# Patient Record
Sex: MALE | Race: WHITE | NOT HISPANIC OR LATINO | Employment: OTHER | URBAN - METROPOLITAN AREA
[De-identification: names, ages, dates, MRNs, and addresses within clinical notes are randomized per-mention and may not be internally consistent; named-entity substitution may affect disease eponyms.]

---

## 2017-01-13 ENCOUNTER — HOSPITAL ENCOUNTER (OUTPATIENT)
Dept: NEUROLOGY | Facility: CLINIC | Age: 82
Setting detail: THERAPIES SERIES
Discharge: STILL ADMITTED OR ADMINISTRATIVE | End: 2017-01-13
Attending: PSYCHIATRY & NEUROLOGY

## 2017-01-13 DIAGNOSIS — G31.84 MCI (MILD COGNITIVE IMPAIRMENT) WITH MEMORY LOSS: ICD-10-CM

## 2017-02-14 ENCOUNTER — COMMUNICATION - HEALTHEAST (OUTPATIENT)
Dept: FAMILY MEDICINE | Facility: CLINIC | Age: 82
End: 2017-02-14

## 2017-02-14 DIAGNOSIS — N52.9 ED (ERECTILE DYSFUNCTION): ICD-10-CM

## 2017-04-11 ENCOUNTER — RECORDS - HEALTHEAST (OUTPATIENT)
Dept: ADMINISTRATIVE | Facility: OTHER | Age: 82
End: 2017-04-11

## 2017-05-16 ENCOUNTER — COMMUNICATION - HEALTHEAST (OUTPATIENT)
Dept: LAB | Facility: CLINIC | Age: 82
End: 2017-05-16

## 2017-05-16 DIAGNOSIS — R73.03 PREDIABETES: ICD-10-CM

## 2017-05-16 DIAGNOSIS — C61 MALIGNANT NEOPLASM OF PROSTATE (H): ICD-10-CM

## 2017-05-18 ENCOUNTER — AMBULATORY - HEALTHEAST (OUTPATIENT)
Dept: LAB | Facility: CLINIC | Age: 82
End: 2017-05-18

## 2017-05-18 DIAGNOSIS — R73.03 PREDIABETES: ICD-10-CM

## 2017-05-18 DIAGNOSIS — C61 MALIGNANT NEOPLASM OF PROSTATE (H): ICD-10-CM

## 2017-05-18 LAB
HBA1C MFR BLD: 6.1 % (ref 3.5–6)
PSA SERPL-MCNC: 0.1 NG/ML (ref 0–6.5)

## 2017-05-31 ENCOUNTER — COMMUNICATION - HEALTHEAST (OUTPATIENT)
Dept: FAMILY MEDICINE | Facility: CLINIC | Age: 82
End: 2017-05-31

## 2017-05-31 DIAGNOSIS — N52.9 ED (ERECTILE DYSFUNCTION): ICD-10-CM

## 2017-06-07 ENCOUNTER — COMMUNICATION - HEALTHEAST (OUTPATIENT)
Dept: FAMILY MEDICINE | Facility: CLINIC | Age: 82
End: 2017-06-07

## 2017-06-07 DIAGNOSIS — N52.9 ED (ERECTILE DYSFUNCTION): ICD-10-CM

## 2017-06-12 ENCOUNTER — COMMUNICATION - HEALTHEAST (OUTPATIENT)
Dept: FAMILY MEDICINE | Facility: CLINIC | Age: 82
End: 2017-06-12

## 2017-06-12 DIAGNOSIS — F30.9 BIPOLAR I DISORDER, SINGLE MANIC EPISODE (H): ICD-10-CM

## 2017-09-27 ENCOUNTER — COMMUNICATION - HEALTHEAST (OUTPATIENT)
Dept: FAMILY MEDICINE | Facility: CLINIC | Age: 82
End: 2017-09-27

## 2017-09-27 DIAGNOSIS — F32.A DEPRESSION: ICD-10-CM

## 2017-10-24 ENCOUNTER — RECORDS - HEALTHEAST (OUTPATIENT)
Dept: ADMINISTRATIVE | Facility: OTHER | Age: 82
End: 2017-10-24

## 2017-11-21 ENCOUNTER — RECORDS - HEALTHEAST (OUTPATIENT)
Dept: ADMINISTRATIVE | Facility: OTHER | Age: 82
End: 2017-11-21

## 2017-11-24 ENCOUNTER — OFFICE VISIT - HEALTHEAST (OUTPATIENT)
Dept: FAMILY MEDICINE | Facility: CLINIC | Age: 82
End: 2017-11-24

## 2017-11-24 DIAGNOSIS — Z00.00 ROUTINE GENERAL MEDICAL EXAMINATION AT A HEALTH CARE FACILITY: ICD-10-CM

## 2017-11-24 DIAGNOSIS — R73.01 IMPAIRED FASTING GLUCOSE: ICD-10-CM

## 2017-11-24 DIAGNOSIS — F30.9 BIPOLAR I DISORDER, SINGLE MANIC EPISODE (H): ICD-10-CM

## 2017-11-24 DIAGNOSIS — G31.84 MCI (MILD COGNITIVE IMPAIRMENT): ICD-10-CM

## 2017-11-24 DIAGNOSIS — C61 MALIGNANT NEOPLASM OF PROSTATE (H): ICD-10-CM

## 2017-11-24 LAB
HBA1C MFR BLD: 6.1 % (ref 3.5–6)
PSA SERPL-MCNC: 0.1 NG/ML (ref 0–6.5)

## 2017-11-24 ASSESSMENT — MIFFLIN-ST. JEOR: SCORE: 1322.43

## 2017-12-13 ENCOUNTER — COMMUNICATION - HEALTHEAST (OUTPATIENT)
Dept: FAMILY MEDICINE | Facility: CLINIC | Age: 82
End: 2017-12-13

## 2018-01-08 ENCOUNTER — HOSPITAL ENCOUNTER (OUTPATIENT)
Dept: NEUROLOGY | Facility: CLINIC | Age: 83
Setting detail: THERAPIES SERIES
Discharge: STILL ADMITTED OR ADMINISTRATIVE | End: 2018-01-08
Attending: PSYCHIATRY & NEUROLOGY

## 2018-01-08 DIAGNOSIS — G31.84 MCI (MILD COGNITIVE IMPAIRMENT): ICD-10-CM

## 2018-01-08 DIAGNOSIS — G31.84 MCI (MILD COGNITIVE IMPAIRMENT) WITH MEMORY LOSS: ICD-10-CM

## 2018-01-08 DIAGNOSIS — F30.9 BIPOLAR I DISORDER, SINGLE MANIC EPISODE (H): ICD-10-CM

## 2018-01-08 DIAGNOSIS — R73.01 IMPAIRED FASTING GLUCOSE: ICD-10-CM

## 2018-01-08 DIAGNOSIS — N40.0 BPH (BENIGN PROSTATIC HYPERPLASIA): ICD-10-CM

## 2018-01-10 ENCOUNTER — COMMUNICATION - HEALTHEAST (OUTPATIENT)
Dept: FAMILY MEDICINE | Facility: CLINIC | Age: 83
End: 2018-01-10

## 2018-01-22 ENCOUNTER — HOSPITAL ENCOUNTER (OUTPATIENT)
Dept: NEUROLOGY | Facility: CLINIC | Age: 83
Setting detail: THERAPIES SERIES
Discharge: STILL ADMITTED OR ADMINISTRATIVE | End: 2018-01-22
Attending: PSYCHIATRY & NEUROLOGY

## 2018-01-22 DIAGNOSIS — G31.84 COGNITIVE IMPAIRMENT, MILD, SO STATED: ICD-10-CM

## 2018-03-12 ENCOUNTER — COMMUNICATION - HEALTHEAST (OUTPATIENT)
Dept: FAMILY MEDICINE | Facility: CLINIC | Age: 83
End: 2018-03-12

## 2018-03-15 ENCOUNTER — COMMUNICATION - HEALTHEAST (OUTPATIENT)
Dept: FAMILY MEDICINE | Facility: CLINIC | Age: 83
End: 2018-03-15

## 2018-03-15 DIAGNOSIS — N52.9 ED (ERECTILE DYSFUNCTION): ICD-10-CM

## 2018-03-23 ENCOUNTER — COMMUNICATION - HEALTHEAST (OUTPATIENT)
Dept: FAMILY MEDICINE | Facility: CLINIC | Age: 83
End: 2018-03-23

## 2018-06-11 ENCOUNTER — COMMUNICATION - HEALTHEAST (OUTPATIENT)
Dept: FAMILY MEDICINE | Facility: CLINIC | Age: 83
End: 2018-06-11

## 2018-06-11 DIAGNOSIS — F30.9 BIPOLAR I DISORDER, SINGLE MANIC EPISODE (H): ICD-10-CM

## 2018-10-01 ENCOUNTER — COMMUNICATION - HEALTHEAST (OUTPATIENT)
Dept: FAMILY MEDICINE | Facility: CLINIC | Age: 83
End: 2018-10-01

## 2018-10-23 ENCOUNTER — RECORDS - HEALTHEAST (OUTPATIENT)
Dept: ADMINISTRATIVE | Facility: OTHER | Age: 83
End: 2018-10-23

## 2018-12-05 ENCOUNTER — COMMUNICATION - HEALTHEAST (OUTPATIENT)
Dept: FAMILY MEDICINE | Facility: CLINIC | Age: 83
End: 2018-12-05

## 2018-12-05 ENCOUNTER — AMBULATORY - HEALTHEAST (OUTPATIENT)
Dept: FAMILY MEDICINE | Facility: CLINIC | Age: 83
End: 2018-12-05

## 2018-12-05 ENCOUNTER — OFFICE VISIT - HEALTHEAST (OUTPATIENT)
Dept: FAMILY MEDICINE | Facility: CLINIC | Age: 83
End: 2018-12-05

## 2018-12-05 DIAGNOSIS — Z12.5 SCREENING FOR PROSTATE CANCER: ICD-10-CM

## 2018-12-05 DIAGNOSIS — Z00.00 ROUTINE GENERAL MEDICAL EXAMINATION AT A HEALTH CARE FACILITY: ICD-10-CM

## 2018-12-05 DIAGNOSIS — R73.03 PRE-DIABETES: ICD-10-CM

## 2018-12-05 DIAGNOSIS — Z80.42 FAMILY HISTORY OF MALIGNANT NEOPLASM OF PROSTATE: ICD-10-CM

## 2018-12-05 DIAGNOSIS — F30.9 BIPOLAR I DISORDER, SINGLE MANIC EPISODE (H): ICD-10-CM

## 2018-12-05 DIAGNOSIS — G31.84 MCI (MILD COGNITIVE IMPAIRMENT): ICD-10-CM

## 2018-12-05 DIAGNOSIS — M19.012 OSTEOARTHRITIS OF LEFT SHOULDER, UNSPECIFIED OSTEOARTHRITIS TYPE: ICD-10-CM

## 2018-12-05 LAB
ANION GAP SERPL CALCULATED.3IONS-SCNC: 9 MMOL/L (ref 5–18)
BUN SERPL-MCNC: 10 MG/DL (ref 8–28)
CALCIUM SERPL-MCNC: 9.7 MG/DL (ref 8.5–10.5)
CHLORIDE BLD-SCNC: 103 MMOL/L (ref 98–107)
CHOLEST SERPL-MCNC: 146 MG/DL
CO2 SERPL-SCNC: 28 MMOL/L (ref 22–31)
CREAT SERPL-MCNC: 0.83 MG/DL (ref 0.7–1.3)
FASTING STATUS PATIENT QL REPORTED: YES
GFR SERPL CREATININE-BSD FRML MDRD: >60 ML/MIN/1.73M2
GLUCOSE BLD-MCNC: 103 MG/DL (ref 70–125)
HBA1C MFR BLD: 6 % (ref 3.5–6)
HDLC SERPL-MCNC: 72 MG/DL
LDLC SERPL CALC-MCNC: 64 MG/DL
POTASSIUM BLD-SCNC: 5.1 MMOL/L (ref 3.5–5)
PSA SERPL-MCNC: 0.1 NG/ML (ref 0–6.5)
SODIUM SERPL-SCNC: 140 MMOL/L (ref 136–145)
TRIGL SERPL-MCNC: 52 MG/DL

## 2018-12-05 ASSESSMENT — MIFFLIN-ST. JEOR: SCORE: 1326.74

## 2018-12-06 ENCOUNTER — COMMUNICATION - HEALTHEAST (OUTPATIENT)
Dept: FAMILY MEDICINE | Facility: CLINIC | Age: 83
End: 2018-12-06

## 2018-12-07 ENCOUNTER — COMMUNICATION - HEALTHEAST (OUTPATIENT)
Dept: FAMILY MEDICINE | Facility: CLINIC | Age: 83
End: 2018-12-07

## 2019-01-04 ENCOUNTER — RECORDS - HEALTHEAST (OUTPATIENT)
Dept: ADMINISTRATIVE | Facility: OTHER | Age: 84
End: 2019-01-04

## 2019-01-21 ENCOUNTER — HOSPITAL ENCOUNTER (OUTPATIENT)
Dept: NEUROLOGY | Facility: CLINIC | Age: 84
Setting detail: THERAPIES SERIES
Discharge: STILL ADMITTED OR ADMINISTRATIVE | End: 2019-01-21
Attending: PSYCHIATRY & NEUROLOGY

## 2019-02-11 ENCOUNTER — RECORDS - HEALTHEAST (OUTPATIENT)
Dept: ADMINISTRATIVE | Facility: OTHER | Age: 84
End: 2019-02-11

## 2019-03-01 ENCOUNTER — OFFICE VISIT - HEALTHEAST (OUTPATIENT)
Dept: FAMILY MEDICINE | Facility: CLINIC | Age: 84
End: 2019-03-01

## 2019-03-01 DIAGNOSIS — Z01.818 PREOP GENERAL PHYSICAL EXAM: ICD-10-CM

## 2019-03-01 DIAGNOSIS — M19.012 PRIMARY OSTEOARTHRITIS OF LEFT SHOULDER: ICD-10-CM

## 2019-03-01 LAB
ATRIAL RATE - MUSE: 78 BPM
DIASTOLIC BLOOD PRESSURE - MUSE: NORMAL MMHG
ERYTHROCYTE [DISTWIDTH] IN BLOOD BY AUTOMATED COUNT: 12 % (ref 11–14.5)
HCT VFR BLD AUTO: 40.6 % (ref 40–54)
HGB BLD-MCNC: 13.4 G/DL (ref 14–18)
INTERPRETATION ECG - MUSE: NORMAL
MCH RBC QN AUTO: 32.6 PG (ref 27–34)
MCHC RBC AUTO-ENTMCNC: 33 G/DL (ref 32–36)
MCV RBC AUTO: 99 FL (ref 80–100)
P AXIS - MUSE: 80 DEGREES
PLATELET # BLD AUTO: 238 THOU/UL (ref 140–440)
PMV BLD AUTO: 7 FL (ref 7–10)
PR INTERVAL - MUSE: 148 MS
QRS DURATION - MUSE: 84 MS
QT - MUSE: 380 MS
QTC - MUSE: 433 MS
R AXIS - MUSE: 24 DEGREES
RBC # BLD AUTO: 4.11 MILL/UL (ref 4.4–6.2)
SYSTOLIC BLOOD PRESSURE - MUSE: NORMAL MMHG
T AXIS - MUSE: 12 DEGREES
VENTRICULAR RATE- MUSE: 78 BPM
WBC: 5.4 THOU/UL (ref 4–11)

## 2019-03-01 ASSESSMENT — MIFFLIN-ST. JEOR: SCORE: 1341.71

## 2019-03-07 ENCOUNTER — COMMUNICATION - HEALTHEAST (OUTPATIENT)
Dept: FAMILY MEDICINE | Facility: CLINIC | Age: 84
End: 2019-03-07

## 2019-03-11 ASSESSMENT — MIFFLIN-ST. JEOR: SCORE: 1326.29

## 2019-03-14 ENCOUNTER — ANESTHESIA - HEALTHEAST (OUTPATIENT)
Dept: SURGERY | Facility: CLINIC | Age: 84
End: 2019-03-14

## 2019-03-15 ENCOUNTER — SURGERY - HEALTHEAST (OUTPATIENT)
Dept: SURGERY | Facility: CLINIC | Age: 84
End: 2019-03-15

## 2019-03-15 ASSESSMENT — MIFFLIN-ST. JEOR: SCORE: 1324.02

## 2019-03-17 ENCOUNTER — HOME CARE/HOSPICE - HEALTHEAST (OUTPATIENT)
Dept: HOME HEALTH SERVICES | Facility: HOME HEALTH | Age: 84
End: 2019-03-17

## 2019-03-17 ASSESSMENT — MIFFLIN-ST. JEOR: SCORE: 1324.02

## 2019-03-19 ENCOUNTER — HOME CARE/HOSPICE - HEALTHEAST (OUTPATIENT)
Dept: HOME HEALTH SERVICES | Facility: HOME HEALTH | Age: 84
End: 2019-03-19

## 2019-03-22 ENCOUNTER — HOME CARE/HOSPICE - HEALTHEAST (OUTPATIENT)
Dept: HOME HEALTH SERVICES | Facility: HOME HEALTH | Age: 84
End: 2019-03-22

## 2019-03-22 ENCOUNTER — HOSPITAL ENCOUNTER (OUTPATIENT)
Dept: ULTRASOUND IMAGING | Facility: CLINIC | Age: 84
Discharge: HOME | End: 2019-03-22
Attending: FAMILY MEDICINE

## 2019-03-22 ENCOUNTER — OFFICE VISIT - HEALTHEAST (OUTPATIENT)
Dept: FAMILY MEDICINE | Facility: CLINIC | Age: 84
End: 2019-03-22

## 2019-03-22 DIAGNOSIS — Z47.1 AFTERCARE FOLLOWING LEFT SHOULDER JOINT REPLACEMENT SURGERY: ICD-10-CM

## 2019-03-22 DIAGNOSIS — Z96.612 AFTERCARE FOLLOWING LEFT SHOULDER JOINT REPLACEMENT SURGERY: ICD-10-CM

## 2019-03-22 DIAGNOSIS — G31.84 MCI (MILD COGNITIVE IMPAIRMENT): ICD-10-CM

## 2019-03-22 DIAGNOSIS — M79.89 LEFT LEG SWELLING: ICD-10-CM

## 2019-03-24 ENCOUNTER — HOME CARE/HOSPICE - HEALTHEAST (OUTPATIENT)
Dept: HOME HEALTH SERVICES | Facility: HOME HEALTH | Age: 84
End: 2019-03-24

## 2019-03-24 ENCOUNTER — RECORDS - HEALTHEAST (OUTPATIENT)
Dept: ADMINISTRATIVE | Facility: OTHER | Age: 84
End: 2019-03-24

## 2019-03-25 ENCOUNTER — HOME CARE/HOSPICE - HEALTHEAST (OUTPATIENT)
Dept: HOME HEALTH SERVICES | Facility: HOME HEALTH | Age: 84
End: 2019-03-25

## 2019-03-27 ENCOUNTER — HOME CARE/HOSPICE - HEALTHEAST (OUTPATIENT)
Dept: HOME HEALTH SERVICES | Facility: HOME HEALTH | Age: 84
End: 2019-03-27

## 2019-03-28 ENCOUNTER — HOME CARE/HOSPICE - HEALTHEAST (OUTPATIENT)
Dept: HOME HEALTH SERVICES | Facility: HOME HEALTH | Age: 84
End: 2019-03-28

## 2019-03-29 ENCOUNTER — HOME CARE/HOSPICE - HEALTHEAST (OUTPATIENT)
Dept: HOME HEALTH SERVICES | Facility: HOME HEALTH | Age: 84
End: 2019-03-29

## 2019-04-01 ENCOUNTER — RECORDS - HEALTHEAST (OUTPATIENT)
Dept: ADMINISTRATIVE | Facility: OTHER | Age: 84
End: 2019-04-01

## 2019-04-01 ENCOUNTER — HOME CARE/HOSPICE - HEALTHEAST (OUTPATIENT)
Dept: HOME HEALTH SERVICES | Facility: HOME HEALTH | Age: 84
End: 2019-04-01

## 2019-04-19 ENCOUNTER — COMMUNICATION - HEALTHEAST (OUTPATIENT)
Dept: FAMILY MEDICINE | Facility: CLINIC | Age: 84
End: 2019-04-19

## 2019-04-19 DIAGNOSIS — F30.9 BIPOLAR I DISORDER, SINGLE MANIC EPISODE (H): ICD-10-CM

## 2019-05-01 ENCOUNTER — RECORDS - HEALTHEAST (OUTPATIENT)
Dept: ADMINISTRATIVE | Facility: OTHER | Age: 84
End: 2019-05-01

## 2019-05-12 ENCOUNTER — RECORDS - HEALTHEAST (OUTPATIENT)
Dept: ADMINISTRATIVE | Facility: OTHER | Age: 84
End: 2019-05-12

## 2019-05-12 ENCOUNTER — COMMUNICATION - HEALTHEAST (OUTPATIENT)
Dept: SCHEDULING | Facility: CLINIC | Age: 84
End: 2019-05-12

## 2019-05-15 ENCOUNTER — OFFICE VISIT - HEALTHEAST (OUTPATIENT)
Dept: FAMILY MEDICINE | Facility: CLINIC | Age: 84
End: 2019-05-15

## 2019-05-15 DIAGNOSIS — R60.0 LEG EDEMA, LEFT: ICD-10-CM

## 2019-05-15 DIAGNOSIS — K59.01 SLOW TRANSIT CONSTIPATION: ICD-10-CM

## 2019-05-15 DIAGNOSIS — L82.1 SEBORRHEIC KERATOSES: ICD-10-CM

## 2019-05-15 DIAGNOSIS — J18.9 PNEUMONIA DUE TO INFECTIOUS ORGANISM, UNSPECIFIED LATERALITY, UNSPECIFIED PART OF LUNG: ICD-10-CM

## 2019-05-15 ASSESSMENT — MIFFLIN-ST. JEOR: SCORE: 1320.84

## 2019-05-19 ENCOUNTER — COMMUNICATION - HEALTHEAST (OUTPATIENT)
Dept: FAMILY MEDICINE | Facility: CLINIC | Age: 84
End: 2019-05-19

## 2019-05-19 DIAGNOSIS — F30.9 BIPOLAR I DISORDER, SINGLE MANIC EPISODE (H): ICD-10-CM

## 2019-08-05 ENCOUNTER — RECORDS - HEALTHEAST (OUTPATIENT)
Dept: ADMINISTRATIVE | Facility: OTHER | Age: 84
End: 2019-08-05

## 2019-08-13 ENCOUNTER — COMMUNICATION - HEALTHEAST (OUTPATIENT)
Dept: FAMILY MEDICINE | Facility: CLINIC | Age: 84
End: 2019-08-13

## 2019-08-13 DIAGNOSIS — F30.9 BIPOLAR I DISORDER, SINGLE MANIC EPISODE (H): ICD-10-CM

## 2019-08-23 ENCOUNTER — COMMUNICATION - HEALTHEAST (OUTPATIENT)
Dept: FAMILY MEDICINE | Facility: CLINIC | Age: 84
End: 2019-08-23

## 2019-08-23 DIAGNOSIS — N52.9 ED (ERECTILE DYSFUNCTION): ICD-10-CM

## 2019-10-09 ENCOUNTER — COMMUNICATION - HEALTHEAST (OUTPATIENT)
Dept: FAMILY MEDICINE | Facility: CLINIC | Age: 84
End: 2019-10-09

## 2019-12-16 ENCOUNTER — OFFICE VISIT - HEALTHEAST (OUTPATIENT)
Dept: FAMILY MEDICINE | Facility: CLINIC | Age: 84
End: 2019-12-16

## 2019-12-16 DIAGNOSIS — D64.9 ANEMIA, UNSPECIFIED: ICD-10-CM

## 2019-12-16 DIAGNOSIS — R97.20 ELEVATED PROSTATE SPECIFIC ANTIGEN (PSA): ICD-10-CM

## 2019-12-16 DIAGNOSIS — Z00.00 ROUTINE GENERAL MEDICAL EXAMINATION AT A HEALTH CARE FACILITY: ICD-10-CM

## 2019-12-16 DIAGNOSIS — R73.03 PRE-DIABETES: ICD-10-CM

## 2019-12-16 DIAGNOSIS — Z00.01 ENCOUNTER FOR GENERAL ADULT MEDICAL EXAMINATION WITH ABNORMAL FINDINGS: ICD-10-CM

## 2019-12-16 DIAGNOSIS — Z80.42 FAMILY HISTORY OF MALIGNANT NEOPLASM OF PROSTATE: ICD-10-CM

## 2019-12-16 LAB
ANION GAP SERPL CALCULATED.3IONS-SCNC: 5 MMOL/L (ref 5–18)
BUN SERPL-MCNC: 8 MG/DL (ref 8–28)
CALCIUM SERPL-MCNC: 9.2 MG/DL (ref 8.5–10.5)
CHLORIDE BLD-SCNC: 105 MMOL/L (ref 98–107)
CHOLEST SERPL-MCNC: 159 MG/DL
CO2 SERPL-SCNC: 30 MMOL/L (ref 22–31)
CREAT SERPL-MCNC: 0.8 MG/DL (ref 0.7–1.3)
ERYTHROCYTE [DISTWIDTH] IN BLOOD BY AUTOMATED COUNT: 12.6 % (ref 11–14.5)
FASTING STATUS PATIENT QL REPORTED: YES
GFR SERPL CREATININE-BSD FRML MDRD: >60 ML/MIN/1.73M2
GLUCOSE BLD-MCNC: 94 MG/DL (ref 70–125)
HBA1C MFR BLD: 5.8 % (ref 3.5–6)
HCT VFR BLD AUTO: 38.8 % (ref 40–54)
HDLC SERPL-MCNC: 73 MG/DL
HGB BLD-MCNC: 13.2 G/DL (ref 14–18)
LDLC SERPL CALC-MCNC: 74 MG/DL
MCH RBC QN AUTO: 32.7 PG (ref 27–34)
MCHC RBC AUTO-ENTMCNC: 34.1 G/DL (ref 32–36)
MCV RBC AUTO: 96 FL (ref 80–100)
PLATELET # BLD AUTO: 313 THOU/UL (ref 140–440)
PMV BLD AUTO: 7 FL (ref 7–10)
POTASSIUM BLD-SCNC: 4.9 MMOL/L (ref 3.5–5)
PSA SERPL-MCNC: 0.2 NG/ML (ref 0–6.5)
RBC # BLD AUTO: 4.03 MILL/UL (ref 4.4–6.2)
SODIUM SERPL-SCNC: 140 MMOL/L (ref 136–145)
TRIGL SERPL-MCNC: 60 MG/DL
WBC: 6.3 THOU/UL (ref 4–11)

## 2019-12-16 ASSESSMENT — MIFFLIN-ST. JEOR: SCORE: 1309.73

## 2019-12-16 ASSESSMENT — ANXIETY QUESTIONNAIRES
2. NOT BEING ABLE TO STOP OR CONTROL WORRYING: NOT AT ALL
1. FEELING NERVOUS, ANXIOUS, OR ON EDGE: NOT AT ALL

## 2020-01-17 ENCOUNTER — RECORDS - HEALTHEAST (OUTPATIENT)
Dept: ADMINISTRATIVE | Facility: OTHER | Age: 85
End: 2020-01-17

## 2020-01-20 ENCOUNTER — RECORDS - HEALTHEAST (OUTPATIENT)
Dept: LAB | Facility: HOSPITAL | Age: 85
End: 2020-01-20

## 2020-01-20 LAB
TSH SERPL DL<=0.005 MIU/L-ACNC: 1.57 UIU/ML (ref 0.3–5)
VIT B12 SERPL-MCNC: 689 PG/ML (ref 213–816)

## 2020-03-15 ENCOUNTER — COMMUNICATION - HEALTHEAST (OUTPATIENT)
Dept: FAMILY MEDICINE | Facility: CLINIC | Age: 85
End: 2020-03-15

## 2020-03-15 DIAGNOSIS — N52.9 ED (ERECTILE DYSFUNCTION): ICD-10-CM

## 2020-05-13 ENCOUNTER — COMMUNICATION - HEALTHEAST (OUTPATIENT)
Dept: FAMILY MEDICINE | Facility: CLINIC | Age: 85
End: 2020-05-13

## 2020-05-13 DIAGNOSIS — F30.9 BIPOLAR I DISORDER, SINGLE MANIC EPISODE (H): ICD-10-CM

## 2020-07-14 PROBLEM — Z96.612 STATUS POST TOTAL REPLACEMENT OF LEFT SHOULDER: Status: ACTIVE | Noted: 2019-03-15

## 2020-07-14 PROBLEM — F30.9 BIPOLAR I DISORDER, SINGLE MANIC EPISODE (H): Status: ACTIVE | Noted: 2020-07-14

## 2020-07-14 PROBLEM — M19.012 OSTEOARTHRITIS OF LEFT SHOULDER, UNSPECIFIED OSTEOARTHRITIS TYPE: Status: ACTIVE | Noted: 2018-12-05

## 2020-07-14 PROBLEM — R73.01 IMPAIRED FASTING GLUCOSE: Status: ACTIVE | Noted: 2020-07-14

## 2020-07-14 PROBLEM — N40.0 BENIGN PROSTATIC HYPERPLASIA: Status: ACTIVE | Noted: 2020-07-14

## 2020-07-14 PROBLEM — K59.01 SLOW TRANSIT CONSTIPATION: Status: ACTIVE | Noted: 2019-05-15

## 2020-07-14 PROBLEM — D64.9 ANEMIA: Status: ACTIVE | Noted: 2020-07-14

## 2020-07-14 PROBLEM — R73.03 PRE-DIABETES: Status: ACTIVE | Noted: 2018-12-05

## 2020-07-14 PROBLEM — G31.84 MCI (MILD COGNITIVE IMPAIRMENT): Status: ACTIVE | Noted: 2017-11-24

## 2020-07-14 PROBLEM — Z00.00 ROUTINE GENERAL MEDICAL EXAMINATION AT A HEALTH CARE FACILITY: Status: ACTIVE | Noted: 2018-12-05

## 2020-07-14 PROBLEM — F09 MILD COGNITIVE DISORDER: Status: ACTIVE | Noted: 2020-07-14

## 2020-07-14 PROBLEM — R60.0 LEG EDEMA, LEFT: Status: ACTIVE | Noted: 2019-05-15

## 2020-07-14 PROBLEM — Z80.42 FAMILY HISTORY OF MALIGNANT NEOPLASM OF PROSTATE: Status: ACTIVE | Noted: 2020-07-14

## 2020-07-14 PROBLEM — J18.9 PNEUMONIA DUE TO INFECTIOUS ORGANISM, UNSPECIFIED LATERALITY, UNSPECIFIED PART OF LUNG: Status: ACTIVE | Noted: 2019-05-15

## 2020-07-14 PROBLEM — L82.1 SEBORRHEIC KERATOSES: Status: ACTIVE | Noted: 2019-05-15

## 2020-07-14 PROBLEM — R41.3 MEMORY LOSS: Status: ACTIVE | Noted: 2020-07-14

## 2020-07-14 PROBLEM — C61 PROSTATE CANCER (H): Status: ACTIVE | Noted: 2020-07-14

## 2020-08-03 ENCOUNTER — OFFICE VISIT (OUTPATIENT)
Dept: NEUROLOGY | Facility: CLINIC | Age: 85
End: 2020-08-03
Payer: COMMERCIAL

## 2020-08-03 VITALS — WEIGHT: 136 LBS | HEIGHT: 70 IN | BODY MASS INDEX: 19.47 KG/M2

## 2020-08-03 DIAGNOSIS — R41.3 MEMORY LOSS: Primary | ICD-10-CM

## 2020-08-03 PROCEDURE — 99214 OFFICE O/P EST MOD 30 MIN: CPT | Mod: 95 | Performed by: PSYCHIATRY & NEUROLOGY

## 2020-08-03 RX ORDER — DIVALPROEX SODIUM 250 MG/1
250 TABLET, DELAYED RELEASE ORAL DAILY
COMMUNITY
Start: 2020-04-27 | End: 2020-11-06

## 2020-08-03 RX ORDER — MIRTAZAPINE 30 MG/1
30 TABLET, FILM COATED ORAL DAILY
COMMUNITY
End: 2022-01-19

## 2020-08-03 RX ORDER — MULTIPLE VITAMINS W/ MINERALS TAB 9MG-400MCG
1 TAB ORAL
COMMUNITY

## 2020-08-03 RX ORDER — FERROUS SULFATE 325(65) MG
1 TABLET ORAL
COMMUNITY
End: 2022-10-28

## 2020-08-03 RX ORDER — PSYLLIUM HUSK 3.4 G/5.8G
1 POWDER ORAL
COMMUNITY

## 2020-08-03 ASSESSMENT — MIFFLIN-ST. JEOR: SCORE: 1313.14

## 2020-08-03 NOTE — NURSING NOTE
Chief Complaint   Patient presents with     Follow Up     follow up appt. Now new issues.  Taking new medication- but feeling about the same.      Visit via computer: [email or web address]    Kathy Mancilla ATC

## 2020-08-03 NOTE — PROGRESS NOTES
"Telephone follow-up visit requested by patient  Telephone follow-up visit due to the COVID-19 pandemic  Patient verified  Phone number [phone]  20 minutes of discussion about symptom signs medication side effects and benefits        Chief Complaint   Memory loss              The patient has been notified of following:     \"This telephone visit will be conducted via a call between you and your physician/provider. We have found that certain health care needs can be provided without the need for a physical exam. This service lets us provide the care you need with a short phone conversation. If a prescription is necessary we can send it directly to your pharmacy. If lab work is needed we can place an order for that and you can then stop by our lab to have the test done at a later time.    If during the course of the call the physician/provider feels a telephone visit is not appropriate, you will not be charged for this service.\"     Patient has given verbal consent for Telephone visit? Yes  Consent has been obtained for this service by 1 care team member: yes.          84-year-old with chief complaint  Mild cognitive impairment  Onset as far back as 2015  Difficulty remembering people's names  Previously cared for by Dr. Vikas Mcginnis    Recent work-up review  Outside records from Dr. Mcginnis January 2018, January 2018 reviewed  EEG January 21, 2020 rare F7/T3 sharp waves with RAINE activity with hyperventilation mild to moderately abnormal  B12 689  TSH 1.57  January 2020 [place] 25 out of 30        Patient is hard of hearing he did have a close caption on his phone no also we did discuss things slowly make sure that I repeated things so that he understood the repeat of things back  Reviewed the above tests    Patient was offered Depakote 250 mg 1 p.o. nightly  Patient previously been on Depakote for 2 to 3 years for his bipolar disorder in the distant past tolerated it well    Talked about the pros and cons of using this " to maybe stabilize the temporal lobe see if his symptoms are little less severe may be slow down the progression    His daughter has depression and is on Depakote    His mother developed dementia but it was in her last urine of life at about age 90  Uncle at age 88 last year of life developed some dementia    Patient tends to be quite active rows or exercises on the exercise bike regularly    Watches his diet hemoglobin A1c's have been good    Tries to stay active volunteers and participates in activities    Patient would follow-up in 3 months to monitor the low-dose Depakote            HPI  84-year-old with history of mild cognitive impairment possibly started about 5 years ago with some difficulty remembering people's names specially people he works with    Previous followed by Dr. Mcginnis    Patient is currently 84 years old  Patient works for [employer] as a   Retired in 1998  His mother had some dementia the last year of her life she lived to be almost 90 years old    Patient lives in a [facility] apartment has an elevator but can use the stairs  Has significant hearing loss wears a hearing aid on the right the left is too bad to be helped by hearing aid  Seems to forget people's names specially people he works with    January 20, 2020 memory testing  [place] 25 out of 30  Knows the president knows current topics knows about the [place] economic trait DL, knows about political issues and current events, knows about the trouble with Josué in [place] and is pretty up-to-date on sports is a [employer] fan nose [facility] [place] will be playing in the Super Bowl  Can jump from topic to topic fairly quickly      Past medical history  Mild cognitive impairment  Bipolar disorder  Prostate cancer  Benign prostatic hypertrophy  Seborrheic dermatitis  Osteoarthritis with shoulder surgery  Left leg edema            Habits   Non-smoker  Does not drink alcohol  Worked as a [employer]  retired in 1998  [facility]  Does exercise regularly stays active      Family history  Mother had glaucoma congestive heart failure dementia  about age 90  Father  at 34 with testicular cancer  He has 1 sister with some diabetes  Daughter with depression treated with Depakote  Uncle with difficulty with memory at the age of 88 last year of his life              Work-up review  MRI scan brain 2016 mild to moderate atrophy progressed mildly from   Laboratory data 2019  Sodium 140 potassium 4.9 BUN 8 creatinine 0.8 glucose 94  White blood count 6.3 hemoglobin 13.2 platelets 313,000  Hemoglobin A1c 5.8%    Outside records from Dr. 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 uses hearing aid on the right    Difficulty with names    No specific a aphasia    Sleep is adequate    Otherwise review systems negative Physical Exam Vitals & Measurements HT: 70 in WT: 136.5 lb BMI: 19.58 Per report and talking with the patient on the phone  Patient is alert and attentive  Has normal naming no difficulty with repetition that I can identify actually has reasonable conversational speech and can talk about multiple topics and jump from topic to topic    Previous [place] 25 out of 2020              Assessment/Plan     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) Mild cognitive impairment  Onset as far back as    Family history of dementia in the later years mom and uncle  History of bipolar disorder  Abnormal EEG F7/T3 sharp wave      Depakote 250 mg 1 tablet nightly    Patient tolerated medicine just fine    No significant changes    Set up face-to-face follow-up visit    Follow-up in 3 months    Discussed the pros and cons of trying a medication to help prevent further progression of difficulties    Patient will call sooner if there is any problems

## 2020-08-03 NOTE — PATIENT INSTRUCTIONS
"  Patient Education   Coping with Memory Loss  What happens when you have memory loss?  Memory loss causes problems with thinking, learning and memory. You may feel forgetful or have trouble focusing on tasks.  Memory loss may be a side effect of medicine, chemotherapy or radiation. In these cases, problems with memory may improve after you finish your treatment. But you could have long-term problems.  Other factors that affect memory and your ability to focus include:    Aging    Illness    Depression    Hormonal changes    Anemia (low red blood cells)    Stress.  What can I do to treat or prevent memory loss?  Problems with thinking and memory can be very frustrating. There is no standard treatment at this time. But there are things you can do to reduce the effects of memory loss:    Really pay attention. Use your eyes, ears and sense of touch to remember things. Focus when someone tells you something.    Limit distractions when you need to complete tasks that require you to focus.    Tell yourself what you are doing as you do it. For example, if you're going out for a few hours, as you close the door tell yourself, \"I'm locking the door now and putting the key in my pocket so I don't have to worry about whether I locked the door.\"    Give yourself clear reminders. If you need to buy dog food, put the empty bag at the door so you'll see it as you leave the house. Or write yourself a note and put it where it's needed.    Keep things in the same place. This way you don't have to wonder where you put your keys, remote control or medicine.    Keep a journal of daily events and activities. Carry a notebook and write down important information that you want to remember.    Exercise your brain. For example, do crossword puzzles, painting, sudoku.    Use word play and rhyming to help remember things.    Use helpful tools, such as:  ? A daily planner  ? A wall calendar  ? Checklists  ? Sticky notes  ? A  near the " door  ? A pre-programmed phone  ? A wristwatch with an alarm  ? A pill box to keep track of your medicines.    Get plenty of sleep and exercise each day.    Stay positive, and try to manage stress.    If you think you may be depressed, talk to your doctor. Depression should be treated.  When should I call my care team?  Call your care team if:    You feel depressed.    You cannot complete basic daily activities.  Comments:  __________________________________________  __________________________________________  __________________________________________  __________________________________________  __________________________________________  __________________________________________  __________________________________________  For informational purposes only. Not to replace the advice of your health care provider. Copyright   2006 [employer]. All rights reserved. Clinically reviewed by [facility]. SMARTworks 941719 - REV 04/19.

## 2020-11-06 ENCOUNTER — VIRTUAL VISIT (OUTPATIENT)
Dept: NEUROLOGY | Facility: CLINIC | Age: 85
End: 2020-11-06
Payer: COMMERCIAL

## 2020-11-06 VITALS — BODY MASS INDEX: 19.47 KG/M2 | WEIGHT: 136 LBS | HEIGHT: 70 IN

## 2020-11-06 DIAGNOSIS — R41.3 MEMORY LOSS: ICD-10-CM

## 2020-11-06 DIAGNOSIS — G31.84 MCI (MILD COGNITIVE IMPAIRMENT): Primary | ICD-10-CM

## 2020-11-06 PROCEDURE — 99214 OFFICE O/P EST MOD 30 MIN: CPT | Mod: 95 | Performed by: PSYCHIATRY & NEUROLOGY

## 2020-11-06 RX ORDER — DIVALPROEX SODIUM 250 MG/1
250 TABLET, DELAYED RELEASE ORAL DAILY
Qty: 30 TABLET | Refills: 11 | Status: SHIPPED | OUTPATIENT
Start: 2020-11-06 | End: 2021-08-13

## 2020-11-06 RX ORDER — TADALAFIL 10 MG/1
TABLET ORAL
COMMUNITY
Start: 2020-03-16 | End: 2021-10-05

## 2020-11-06 ASSESSMENT — MIFFLIN-ST. JEOR: SCORE: 1313.14

## 2020-11-06 NOTE — LETTER
"    11/6/2020         RE: Patrick Wharton  [address]  [facility] [address]        Dear Colleague,    Thank you for referring your patient, Patrick Wharton, to the [facility] [place]. Please see a copy of my visit note below.    Video follow-up visit requested by patient  Video follow-up visit due to the COVID-19 pandemic  Patient identified  Text number [phone]    .Patient is being evaluated via a billable video visit. The patient has been notified of following:     \"This video visit will be conducted via a call between you and your physician/provider. We have found that certain health care needs can be provided without the need for an in-person physical exam. This service lets us provide the care you need with a video conversation. If a prescription is necessary we can send it directly to your pharmacy. If lab work is needed we can place an order for that and you can then stop by our lab to have the test done at a later time.    If during the course of the call the physician/provider feels a video visit is not appropriate, you will not be charged for this service.    Physician has received verbal consent for a Video Visit from the patient? YES    Patient would like the video invitation sent by: Text number [phone]    Video Visit Details    Type of service: Video Visit    Video Start Time: 11:30 AM    Video End Time (time video stopped): Noon    Originating Location (pt. Location): Patient's Home    Distant Location (provider location): [facility] [place]     Mode of Communication: Video Conference via Roshan/ litzy            Chief Complaint   Memory loss      HPI  84-year-old with mild cognitive impairment      Mild cognitive impairment  Onset as far back as 2015  Difficulty remembering people's names  Previously cared for by Dr. Vikas Mcginnis    Started Depakote last visit to a 50 mg 1 p.o. nightly  No tremor  No GI distress  Tolerating medication " fine  Walks a mile and a quarter every day    Memory is stable              Recent work-up review  Outside records from Dr. Mcginnis January 2018, January 2018 reviewed  EEG January 21, 2020 rare F7/T3 sharp waves with RAINE activity with hyperventilation mild to moderately abnormal  B12 689  TSH 1.57  January 2020 [place] 25 out of 30          Patient was offered Depakote 250 mg 1 p.o. nightly  Patient previously been on Depakote for 2 to 3 years for his bipolar disorder in the distant past tolerated it well    Talked about the pros and cons of using this to maybe stabilize the temporal lobe see if his symptoms are little less severe may be slow down the progression    His daughter has depression and is on Depakote    His mother developed dementia but it was in her last urine of life at about age 90  Uncle at age 88 last year of life developed some dementia    Patient tends to be quite active rows or exercises on the exercise bike regularly    Watches his diet hemoglobin A1c's have been good    Tries to stay active volunteers and participates in activities                HPI  84-year-old with history of mild cognitive impairment possibly started about 5 years ago with some difficulty remembering people's names specially people he works with    Previous followed by Dr. Mcginnis    Patient is currently 84 years old  Patient works for [employer] as a   Retired in 1998  His mother had some dementia the last year of her life she lived to be almost 90 years old    Patient lives in a seniors apartment has an elevator but can use the stairs  Has significant hearing loss wears a hearing aid on the right the left is too bad to be helped by hearing aid  Seems to forget people's names specially people he works with    January 20, 2020 memory testing  [place] 25 out of 30  Knows the president knows current topics knows about the [place] economic trait DL, knows about political issues and current events, knows about the trouble with Josué in [place]  and is pretty up-to-date on sports is a [employer] fan nose at the [employer] in [place] will be playing in the Super Bowl  Can jump from topic to topic fairly quickly      Past medical history  Mild cognitive impairment  Bipolar disorder  Prostate cancer  Benign prostatic hypertrophy  Seborrheic dermatitis  Osteoarthritis with shoulder surgery  Left leg edema            Habits   Non-smoker  Does not drink alcohol  Worked as a [employer]  retired in   Lives in seniors apartment with elevator  Does exercise regularly stays active      Family history  Mother had glaucoma congestive heart failure dementia  about age 90  Father  at 34 with testicular cancer  He has 1 sister with some diabetes  Daughter with depression treated with Depakote  Uncle with difficulty with memory at the age of 88 last year of his life              Work-up review  MRI scan brain 2016 mild to moderate atrophy progressed mildly from   Laboratory data 2019  Sodium 140 potassium 4.9 BUN 8 creatinine 0.8 glucose 94  White blood count 6.3 hemoglobin 13.2 platelets 313,000  Hemoglobin A1c 5.8%    Outside records from Dr. Mcginnis 2018, 2018 reviewed  EEG 2020 rare F7/T3 sharp waves with RAINE activity with hyperventilation mild to moderately abnormal  B12 689  TSH 1.57  2020 [place]      review of systems  No headache no chest pain or shortness of breath no nausea vomiting no diarrhea no fever chills no abdominal pain no cough no sore throat    No diplopia dysarthria dysphagia  No focal weakness  No tremor  Gait is good    Has terrible hearing in 1 year and wears a hearing aid in the other chronic    Has impotence due to prostate hypertrophy and history of prostate cancer    Otherwise review systems negative    General exam   alert oriented x3  HEENT significant for hearing loss in 1 ear and poor hearing in the other  Moving air well  Abdomen soft  No edema the feet    Neurologic exam  Alert  orient x3  Normal prosody speech  Normal naming  Normal comprehension  Normal repetition  No aphasia  No neglect    Memory recall is okay  Year is 2020  Month is November  Current president is [place]  Holiday coming up is Thanksgiving  Can jump from topic to topic and talk about current events    Previous [place] 25 out of 30 January 2020    Cranial 2 through 12 are significant for  Deaf in 1 ear significant hearing loss of the other  No ophthalmoplegia  No nystagmus  Face symmetrical  Tongue twisters okay  Visual fields intact    Upper extremities  No drift no tremor normal finger-nose    Lower extremities  Distal proximal strength good    Gait  Sits up in the chair walks around his apartment    Walks a mile and a quarter every day without difficulty          Assessment/Plan     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)   Mild cognitive impairment    Onset as far back as 2015   Family history of dementia in the later years mom and uncle  History of bipolar disorder  Abnormal EEG F7/T3 sharp wave     Depakote 250 mg 1 tablet nightly    Tolerating medication  Follow-up in 6 months  Discussed the pros and cons of trying a medication to help prevent further progression of difficulties    Patient will call sooner if there is any problems   also discussed with patient's wife    Today's visit 30 minutes total care time with greater than 50% face-to-face discussion about the above.        Again, thank you for allowing me to participate in the care of your patient.        Sincerely,        Ramon Rene MD

## 2020-11-06 NOTE — NURSING NOTE
Chief Complaint   Patient presents with     Follow Up     Follow up memory. Pt states he is doing well.      Video Visit     Send email link to [email or web address]     Rolanda Lomeli LPN on 11/6/2020 at 11:33 AM

## 2020-11-06 NOTE — PROGRESS NOTES
"Video follow-up visit requested by patient  Video follow-up visit due to the COVID-19 pandemic  Patient identified  Text number [phone]    .Patient is being evaluated via a billable video visit. The patient has been notified of following:     \"This video visit will be conducted via a call between you and your physician/provider. We have found that certain health care needs can be provided without the need for an in-person physical exam. This service lets us provide the care you need with a video conversation. If a prescription is necessary we can send it directly to your pharmacy. If lab work is needed we can place an order for that and you can then stop by our lab to have the test done at a later time.    If during the course of the call the physician/provider feels a video visit is not appropriate, you will not be charged for this service.    Physician has received verbal consent for a Video Visit from the patient? YES    Patient would like the video invitation sent by: Text number [phone]    Video Visit Details    Type of service: Video Visit    Video Start Time: 11:30 AM    Video End Time (time video stopped): Noon    Originating Location (pt. Location): Patient's Home    Distant Location (provider location): [facility] [place]     Mode of Communication: Video Conference via V[employer]/ Wander[employer]            Chief Complaint   Memory loss      HPI  84-year-old with mild cognitive impairment      Mild cognitive impairment  Onset as far back as 2015  Difficulty remembering people's names  Previously cared for by Dr. Vikas Mcginnis    Started Depakote last visit to a 50 mg 1 p.o. nightly  No tremor  No GI distress  Tolerating medication fine  Walks a mile and a quarter every day    Memory is stable              Recent work-up review  Outside records from Dr. Mcginnis January 2018, January 2018 reviewed  EEG January 21, 2020 rare F7/T3 sharp waves with RAINE activity with hyperventilation mild to " moderately abnormal  B12 689  TSH 1.57  January 2020 [place] 25 out of 30          Patient was offered Depakote 250 mg 1 p.o. nightly  Patient previously been on Depakote for 2 to 3 years for his bipolar disorder in the distant past tolerated it well    Talked about the pros and cons of using this to maybe stabilize the temporal lobe see if his symptoms are little less severe may be slow down the progression    His daughter has depression and is on Depakote    His mother developed dementia but it was in her last urine of life at about age 90  Uncle at age 88 last year of life developed some dementia    Patient tends to be quite active rows or exercises on the exercise bike regularly    Watches his diet hemoglobin A1c's have been good    Tries to stay active volunteers and participates in activities                HPI  84-year-old with history of mild cognitive impairment possibly started about 5 years ago with some difficulty remembering people's names specially people he works with    Previous followed by Dr. Mcginnis    Patient is currently 84 years old  Patient works for [employer] as a   Retired in 1998  His mother had some dementia the last year of her life she lived to be almost 90 years old    Patient lives in a seniors apartment has an elevator but can use the stairs  Has significant hearing loss wears a hearing aid on the right the left is too bad to be helped by hearing aid  Seems to forget people's names specially people he works with    January 20, 2020 memory testing  [place] 25 out of 30  Knows the president knows current topics knows about the [place] economic trait DL, knows about political issues and current events, knows about the trouble with Josué in [place] and is pretty up-to-date on sports is a [employer] [place] fan nose at the [employer] in [place] will be playing in the Super Bowl  Can jump from topic to topic fairly quickly      Past medical history  Mild cognitive impairment  Bipolar disorder  Prostate  cancer  Benign prostatic hypertrophy  Seborrheic dermatitis  Osteoarthritis with shoulder surgery  Left leg edema            Habits   Non-smoker  Does not drink alcohol  Worked as a [employer]  retired in   Lives in seniors apartment with elevator  Does exercise regularly stays active      Family history  Mother had glaucoma congestive heart failure dementia  about age 90  Father  at 34 with testicular cancer  He has 1 sister with some diabetes  Daughter with depression treated with Depakote  Uncle with difficulty with memory at the age of 88 last year of his life              Work-up review  MRI scan brain 2016 mild to moderate atrophy progressed mildly from   Laboratory data 2019  Sodium 140 potassium 4.9 BUN 8 creatinine 0.8 glucose 94  White blood count 6.3 hemoglobin 13.2 platelets 313,000  Hemoglobin A1c 5.8%    Outside records from Dr. Mcginnis 2018, 2018 reviewed  EEG 2020 rare F7/T3 sharp waves with RAINE activity with hyperventilation mild to moderately abnormal  B12 689  TSH 1.57  2020 [place]      review of systems  No headache no chest pain or shortness of breath no nausea vomiting no diarrhea no fever chills no abdominal pain no cough no sore throat    No diplopia dysarthria dysphagia  No focal weakness  No tremor  Gait is good    Has terrible hearing in 1 year and wears a hearing aid in the other chronic    Has impotence due to prostate hypertrophy and history of prostate cancer    Otherwise review systems negative    General exam   alert oriented x3  HEENT significant for hearing loss in 1 ear and poor hearing in the other  Moving air well  Abdomen soft  No edema the feet    Neurologic exam  Alert orient x3  Normal prosody speech  Normal naming  Normal comprehension  Normal repetition  No aphasia  No neglect    Memory recall is okay   is   Month is November  Current president is Hiro Leija coming up is Thanksgiving  Can jump  from topic to topic and talk about current events    Previous [place] 25 out of 30 January 2020    Cranial 2 through 12 are significant for  Deaf in 1 ear significant hearing loss of the other  No ophthalmoplegia  No nystagmus  Face symmetrical  Tongue twisters okay  Visual fields intact    Upper extremities  No drift no tremor normal finger-nose    Lower extremities  Distal proximal strength good    Gait  Sits up in the chair walks around his apartment    Walks a mile and a quarter every day without difficulty          Assessment/Plan     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)   Mild cognitive impairment    Onset as far back as 2015   Family history of dementia in the later years mom and uncle  History of bipolar disorder  Abnormal EEG F7/T3 sharp wave     Depakote 250 mg 1 tablet nightly    Tolerating medication  Follow-up in 6 months  Discussed the pros and cons of trying a medication to help prevent further progression of difficulties    Patient will call sooner if there is any problems   also discussed with patient's wife    Today's visit 30 minutes total care time with greater than 50% face-to-face discussion about the above.

## 2020-11-13 ENCOUNTER — RECORDS - HEALTHEAST (OUTPATIENT)
Dept: ADMINISTRATIVE | Facility: OTHER | Age: 85
End: 2020-11-13

## 2020-11-21 ENCOUNTER — COMMUNICATION - HEALTHEAST (OUTPATIENT)
Dept: FAMILY MEDICINE | Facility: CLINIC | Age: 85
End: 2020-11-21

## 2020-11-21 DIAGNOSIS — N52.9 ED (ERECTILE DYSFUNCTION): ICD-10-CM

## 2020-11-30 ENCOUNTER — COMMUNICATION - HEALTHEAST (OUTPATIENT)
Dept: FAMILY MEDICINE | Facility: CLINIC | Age: 85
End: 2020-11-30

## 2020-11-30 ENCOUNTER — OFFICE VISIT - HEALTHEAST (OUTPATIENT)
Dept: FAMILY MEDICINE | Facility: CLINIC | Age: 85
End: 2020-11-30

## 2020-11-30 DIAGNOSIS — K40.90 LEFT INGUINAL HERNIA: ICD-10-CM

## 2020-12-03 ENCOUNTER — OFFICE VISIT - HEALTHEAST (OUTPATIENT)
Dept: SURGERY | Facility: CLINIC | Age: 85
End: 2020-12-03

## 2020-12-03 ENCOUNTER — COMMUNICATION - HEALTHEAST (OUTPATIENT)
Dept: SURGERY | Facility: CLINIC | Age: 85
End: 2020-12-03

## 2020-12-03 DIAGNOSIS — K40.90 NON-RECURRENT UNILATERAL INGUINAL HERNIA WITHOUT OBSTRUCTION OR GANGRENE: ICD-10-CM

## 2020-12-03 ASSESSMENT — MIFFLIN-ST. JEOR: SCORE: 1300.2

## 2020-12-04 ENCOUNTER — AMBULATORY - HEALTHEAST (OUTPATIENT)
Dept: SURGERY | Facility: AMBULATORY SURGERY CENTER | Age: 85
End: 2020-12-04

## 2020-12-04 DIAGNOSIS — Z11.59 ENCOUNTER FOR SCREENING FOR OTHER VIRAL DISEASES: ICD-10-CM

## 2020-12-18 ENCOUNTER — OFFICE VISIT - HEALTHEAST (OUTPATIENT)
Dept: FAMILY MEDICINE | Facility: CLINIC | Age: 85
End: 2020-12-18

## 2020-12-18 DIAGNOSIS — E78.5 DYSLIPIDEMIA: ICD-10-CM

## 2020-12-18 DIAGNOSIS — G31.84 MCI (MILD COGNITIVE IMPAIRMENT): ICD-10-CM

## 2020-12-18 DIAGNOSIS — R73.03 PRE-DIABETES: ICD-10-CM

## 2020-12-18 DIAGNOSIS — K40.90 LEFT INGUINAL HERNIA: ICD-10-CM

## 2020-12-18 DIAGNOSIS — Z00.00 ROUTINE GENERAL MEDICAL EXAMINATION AT A HEALTH CARE FACILITY: ICD-10-CM

## 2020-12-18 DIAGNOSIS — Z01.818 PREOP GENERAL PHYSICAL EXAM: ICD-10-CM

## 2020-12-18 DIAGNOSIS — Z80.42 FAMILY HISTORY OF MALIGNANT NEOPLASM OF PROSTATE: ICD-10-CM

## 2020-12-18 DIAGNOSIS — R97.20 ELEVATED PROSTATE SPECIFIC ANTIGEN (PSA): ICD-10-CM

## 2020-12-18 LAB
ANION GAP SERPL CALCULATED.3IONS-SCNC: 10 MMOL/L (ref 5–18)
ATRIAL RATE - MUSE: 70 BPM
BUN SERPL-MCNC: 8 MG/DL (ref 8–28)
CALCIUM SERPL-MCNC: 9.3 MG/DL (ref 8.5–10.5)
CHLORIDE BLD-SCNC: 105 MMOL/L (ref 98–107)
CHOLEST SERPL-MCNC: 147 MG/DL
CO2 SERPL-SCNC: 26 MMOL/L (ref 22–31)
CREAT SERPL-MCNC: 0.79 MG/DL (ref 0.7–1.3)
DIASTOLIC BLOOD PRESSURE - MUSE: NORMAL
ERYTHROCYTE [DISTWIDTH] IN BLOOD BY AUTOMATED COUNT: 12.7 % (ref 11–14.5)
FASTING STATUS PATIENT QL REPORTED: NORMAL
GFR SERPL CREATININE-BSD FRML MDRD: >60 ML/MIN/1.73M2
GLUCOSE BLD-MCNC: 101 MG/DL (ref 70–125)
HBA1C MFR BLD: 5.5 %
HCT VFR BLD AUTO: 39.3 % (ref 40–54)
HDLC SERPL-MCNC: 72 MG/DL
HGB BLD-MCNC: 13.2 G/DL (ref 14–18)
INTERPRETATION ECG - MUSE: NORMAL
LDLC SERPL CALC-MCNC: 62 MG/DL
MCH RBC QN AUTO: 33.2 PG (ref 27–34)
MCHC RBC AUTO-ENTMCNC: 33.6 G/DL (ref 32–36)
MCV RBC AUTO: 99 FL (ref 80–100)
P AXIS - MUSE: 54 DEGREES
PLATELET # BLD AUTO: 220 THOU/UL (ref 140–440)
PMV BLD AUTO: 7.2 FL (ref 7–10)
POTASSIUM BLD-SCNC: 5.1 MMOL/L (ref 3.5–5)
PR INTERVAL - MUSE: 160 MS
PSA SERPL-MCNC: 0.2 NG/ML (ref 0–6.5)
QRS DURATION - MUSE: 84 MS
QT - MUSE: 384 MS
QTC - MUSE: 414 MS
R AXIS - MUSE: 26 DEGREES
RBC # BLD AUTO: 3.98 MILL/UL (ref 4.4–6.2)
SODIUM SERPL-SCNC: 141 MMOL/L (ref 136–145)
SYSTOLIC BLOOD PRESSURE - MUSE: NORMAL
T AXIS - MUSE: 45 DEGREES
TRIGL SERPL-MCNC: 63 MG/DL
VENTRICULAR RATE- MUSE: 70 BPM
WBC: 6.7 THOU/UL (ref 4–11)

## 2020-12-18 ASSESSMENT — ANXIETY QUESTIONNAIRES
1. FEELING NERVOUS, ANXIOUS, OR ON EDGE: NOT AT ALL
2. NOT BEING ABLE TO STOP OR CONTROL WORRYING: NOT AT ALL

## 2020-12-18 ASSESSMENT — MIFFLIN-ST. JEOR: SCORE: 1298.17

## 2020-12-20 ENCOUNTER — AMBULATORY - HEALTHEAST (OUTPATIENT)
Dept: FAMILY MEDICINE | Facility: CLINIC | Age: 85
End: 2020-12-20

## 2020-12-20 DIAGNOSIS — Z11.59 ENCOUNTER FOR SCREENING FOR OTHER VIRAL DISEASES: ICD-10-CM

## 2020-12-21 LAB
SARS-COV-2 PCR COMMENT: NORMAL
SARS-COV-2 RNA SPEC QL NAA+PROBE: NEGATIVE
SARS-COV-2 VIRUS SPECIMEN SOURCE: NORMAL

## 2020-12-22 ENCOUNTER — COMMUNICATION - HEALTHEAST (OUTPATIENT)
Dept: SCHEDULING | Facility: CLINIC | Age: 85
End: 2020-12-22

## 2020-12-22 ENCOUNTER — ANESTHESIA - HEALTHEAST (OUTPATIENT)
Dept: SURGERY | Facility: AMBULATORY SURGERY CENTER | Age: 85
End: 2020-12-22

## 2020-12-22 ASSESSMENT — MIFFLIN-ST. JEOR
SCORE: 1287.74
SCORE: 1287.74

## 2020-12-23 ENCOUNTER — HOSPITAL ENCOUNTER (OUTPATIENT)
Dept: SURGERY | Facility: AMBULATORY SURGERY CENTER | Age: 85
Discharge: HOME | End: 2020-12-23
Attending: SURGERY | Admitting: SURGERY
Payer: COMMERCIAL

## 2020-12-23 ENCOUNTER — SURGERY - HEALTHEAST (OUTPATIENT)
Dept: SURGERY | Facility: AMBULATORY SURGERY CENTER | Age: 85
End: 2020-12-23

## 2020-12-23 DIAGNOSIS — K40.90 NON-RECURRENT UNILATERAL INGUINAL HERNIA WITHOUT OBSTRUCTION OR GANGRENE: ICD-10-CM

## 2021-01-07 ENCOUNTER — OFFICE VISIT - HEALTHEAST (OUTPATIENT)
Dept: SURGERY | Facility: CLINIC | Age: 86
End: 2021-01-07

## 2021-01-07 DIAGNOSIS — K40.90 NON-RECURRENT UNILATERAL INGUINAL HERNIA WITHOUT OBSTRUCTION OR GANGRENE: ICD-10-CM

## 2021-01-15 ENCOUNTER — COMMUNICATION - HEALTHEAST (OUTPATIENT)
Dept: FAMILY MEDICINE | Facility: CLINIC | Age: 86
End: 2021-01-15

## 2021-01-15 DIAGNOSIS — F30.9 BIPOLAR I DISORDER, SINGLE MANIC EPISODE (H): ICD-10-CM

## 2021-05-25 ENCOUNTER — RECORDS - HEALTHEAST (OUTPATIENT)
Dept: ADMINISTRATIVE | Facility: CLINIC | Age: 86
End: 2021-05-25

## 2021-05-26 NOTE — PROGRESS NOTES
Patient ID: Patrick Wharton is a 83 y.o. male.  /64   Pulse 74   Wt 151 lb 1.6 oz (68.5 kg) Comment: with shoulder pad on (5#)  SpO2 96%   BMI 21.68 kg/m      Assessment/Plan:                   Diagnoses and all orders for this visit:    Left leg swelling  -     US Venous Leg Left    Aftercare following left shoulder joint replacement surgery    MCI (mild cognitive impairment)      DISCUSSION  The swelling in the leg is clinically overall relatively benign, given his recent surgery and other underlying medical concerns I do recommend that we rule out a blood clot more definitively by obtaining an ultrasound.  They are agreeable to doing this today after much discussion on the topic.    Continue postoperative cares otherwise taper off of pain medication usage as soon as possible.  No indication for any additional labs at this point in time.  Subjective:     HPI    Patrick Wharton is a 83 y.o. male is here today with his wife for follow-up after undergoing left shoulder replacement surgery on March 15, 2019.  He has had a relatively uneventful recovery.  He had mild anemia which was improving at the time of his hospital discharge.  He reports having bilateral lower extremity edema that has improved somewhat but he has had persistent left leg edema at this point in time and this is a concern for them.  He denies any pain.  He states the swelling lessens overnight when he is laying down.  They are wearing compression stockings at nighttime only.  He is not watching his salt intake but we did not identify any particular salty foods that he is eating.  He does not have a history of heart failure, kidney failure or liver failure.  He reports he is eating normally.  He is taking tramadol primarily at night to manage pain.  Had a discussion today about prescribing of pain medications.  His wife was quite concerned about the amount of pills that were prescribed and we discussed this today.  He denies chest pain shortness  of breath dizziness lightheadedness and syncope.    Review of Systems  Complete review of systems is obtained.  Other than the specific considerations noted above complete review of systems is negative.        Objective:   Medications:  Current Outpatient Medications   Medication Sig Note     acetaminophen (TYLENOL) 500 MG tablet Take 2 tablets (1,000 mg total) by mouth 3 (three) times a day as needed for pain.      aspirin 325 MG EC tablet Take 1 tablet (325 mg total) by mouth 2 (two) times a day with meals.      ferrous sulfate (IRON) 325 (65 FE) MG tablet Take 1 tablet by mouth daily with breakfast.      mirtazapine (REMERON) 15 MG tablet Take 1 tablet (15 mg total) by mouth at bedtime. 3/15/2019: Current dose is 15 mg at bedtime.     mirtazapine (REMERON) 30 MG tablet Take 1 tablet (30 mg total) by mouth at bedtime.      multivitamin (MEN'S MULTI-VITAMIN) per tablet Take 1 tablet by mouth daily.      psyllium (METAMUCIL) 3.4 gram packet Take 1 packet by mouth daily as needed.      sildenafil, antihypertensive, (REVATIO) 20 mg tablet Take 2-3 tablets (40-60 mg) as needed.  Did not take more than once in a 24-hour period..      tadalafil (CIALIS) 10 MG tablet Take 1 tablet (10 mg total) by mouth daily as needed for erectile dysfunction.      traMADol (ULTRAM) 50 mg tablet Take 1-2 tablets ( mg total) by mouth every 6 (six) hours as needed for pain.      Allergies:  No Known Allergies    Tobacco:   reports that  has never smoked. he has never used smokeless tobacco.     Physical Exam      /64   Pulse 74   Wt 151 lb 1.6 oz (68.5 kg) Comment: with shoulder pad on (5#)  SpO2 96%   BMI 21.68 kg/m      General: Patient alert no signs of distress    Left shoulder is in a an immobilizing device.    Examination of the legs reveals that there is swelling in the left leg from the mid shin downward through the foot.  There is slight pitting.  There is no significant area pain on palpation no area of redness is  observed.  There is good capillary refill in both distal lower extremities.

## 2021-05-28 NOTE — TELEPHONE ENCOUNTER
Pt and his wife are calling in about a frequent cough he has had for a few days. Pt is unable to get anything up and indicates he cant breathe. Pt sounds congested. Pt denies runny nose or fever. Home care measures discussed, humidified air, and increasing fluids to help thin secretions. Pt says he has not tried this, but his wife is in the back ground telling him to tell me he cant breathe. Pt then says he cant catch his breath because of frequent coughing spells. Wife again tells him to tell me he cant breathe. Wife thinks he should go in to be seen, but does not know what insurance will cover.  Per protocol, pt should be seen in the ER, but wife will drive him to the [facility] room now because the [facility] is now closed.   Pt agrees with plan and will call back if symptoms worsen.    Keenan Connors RN [facility] Triage Nurse Advisor/Medication Refill.        Reason for Disposition    Difficulty breathing    Protocols used: COUGH - ACUTE [id number]

## 2021-05-28 NOTE — PROGRESS NOTES
Assessment/Plan:    1. Pneumonia due to infectious organism, unspecified laterality, unspecified part of lung  Follow-up from walk-in care.  Patient's cough has significantly improved just over the past few days.  Lungs without wheezing or crackles on exam today.  I recommend that patient finish out course of antibiotic and return to clinic with any return of symptoms or with the development of any new symptoms.    2. Leg edema, left  Reviewed left leg ultrasound from March 2019 which was negative for DVT. Continues to have some dependent left lower leg edema at the end of the day but he feels that it is overall improved.  I recommend compression stockings, elevating when possible.  He will continue to monitor for any worsening symptoms.  Advised to notify us if edema persists, worsens or if he develops any discomfort in his left lower extremity.    3. Slow transit constipation  Continue with Metamucil, adequate water intake throughout the day and bisacodyl  as needed to promote soft, regular bowel movements.  Follow-up in clinic with any worsening constipation.    4. Seborrheic keratoses  Lesion appears most consistent with a seborratic keratosis.  Patient has follow-up scheduled with dermatology next month for evaluation.    Subjective:    Patrick Wharton is a 84-year-old male seen today for follow-up of pneumonia.  Patient was seen in [place] walk-in care 2 days ago after having a cough for 2 weeks.  He was treated for pneumonia with 10-day course of doxycycline.  Today, patient reports that cough significantly improved to the next day.  He is continuing to finish out the antibiotic as prescribed however.  Cough is intermittent.  He is not having any chest pain or shortness of breath.  No fevers, chills, body aches or other systemic symptoms.    He has a few concerns today first concern is in regards to left lower leg edema.  Patient had a left shoulder replacement in March 2019.  He has had some mild left lower  extremity edema since then.  He had an ultrasound at that time which was negative for DVT.  He reports that swelling has been improving significantly since then however he still does notice some occasional swelling, particularly at the end of the day.  He was wearing compression stockings initially but has since stopped using these.  He denies any pain in his left calf.    Patient would also like to discuss constipation.  He wants to make sure that it is okay to be taking Metamucil and bisacodyl on occasion for management of this.  He tries to maintain adequate water intake throughout the day.  He takes daily Metamucil and if he goes more than a day without a bowel movement he will take a bicycle tablet.  He is having stools at least every other day.  Typically does not have to strain when he uses his over-the-counter medications.  He denies any blood or mucus in the stool.  He is not having any abdominal pain.    He has a lesion on his forehead that he would like evaluated today.  He reports having several seborrheic keratosis lesions removed by dermatology in the past.  He feels that it is likely the same.  However, it is larger than most of his lesions and raised.  Patient does not bleed.  No recent change to the lesion that he has noticed.  He has an appointment scheduled with his dermatologist next month to have it evaluated.  Review of systems is as stated in HPI, and the remainder of the 10 system review is otherwise unremarkable.      Per hospital discharge note:  Medical Decision Making   Impression and Plan:  Patrick Wharton is a 84 y.o. male who presented for evaluation of cough. A broad differential was considered including pneumonia, reactive airway disease, pneumothorax, cardiac equivalent, viral upper respiratory infection, and allergic phenomena. A chest xray was obtained and was unremarkable without evidence for pneumonia, pneumothorax, or pleural effusion. His history and physical exam are consistent  with atypical pneumonia and I think it is reasonable to try a course of antibiotics given his duration and acute worsening of symptoms.    . There is no indication for hospitalization at this time and he is not hypoxic or in respiratory distress. Supportive outpatient management is indicated with medications for discharge as noted below. He will follow up with his PCP as needed if his symptoms persist. He will return for increased wheezing, progressive shortness of breath, high fever, or any other emergent concern.      Past Medical History, Family History, and Social History reviewed.    Past Surgical History:   Procedure Laterality Date     BACK SURGERY       MI RECONSTR TOTAL SHOULDER IMPLANT Left 3/15/2019    Procedure: LEFT TOTAL SHOULDER ARTHROPLASTY;  Surgeon: Emiliano Monet MD;  Location: [facility];  Service: Orthopedics     PROSTATE SURGERY          No family history on file.     Past Medical History:   Diagnosis Date     Anemia      Arthritis      Bipolar 1 disorder (H)      BPH (benign prostatic hyperplasia)      History of anesthesia complications     difficult intubation  cant open mouth wide .told should have medical alert bracelet     [place] (hard of hearing)      MCI (mild cognitive impairment)      Prostate CA (H)         Social History     Tobacco Use     Smoking status: Never Smoker     Smokeless tobacco: Never Used   Substance Use Topics     Alcohol use: No     Frequency: Never     Drug use: No        Current Outpatient Medications   Medication Sig Dispense Refill     acetaminophen (TYLENOL) 500 MG tablet Take 2 tablets (1,000 mg total) by mouth 3 (three) times a day as needed for pain.  0     aspirin 325 MG EC tablet Take 1 tablet (325 mg total) by mouth 2 (two) times a day with meals. 60 tablet 0     benzonatate (TESSALON) 100 MG capsule Take 100 mg by mouth 2 (two) times daily after lunch and supper.       ferrous sulfate (IRON) 325 (65 FE) MG tablet Take 1 tablet by mouth daily with  "breakfast.       mirtazapine (REMERON SOL-TAB) 30 MG disintegrating tablet Take 1 tablet (30 mg total) by mouth at bedtime. 30 tablet 6     mirtazapine (REMERON) 15 MG tablet Take 1 tablet (15 mg total) by mouth at bedtime. 90 tablet 3     mirtazapine (REMERON) 30 MG tablet Take 1 tablet (30 mg total) by mouth at bedtime. 90 tablet 2     multivitamin (MEN'S MULTI-VITAMIN) per tablet Take 1 tablet by mouth daily.       psyllium (METAMUCIL) 3.4 gram packet Take 1 packet by mouth daily as needed.       sildenafil, antihypertensive, (REVATIO) 20 mg tablet Take 2-3 tablets (40-60 mg) as needed.  Did not take more than once in a 24-hour period.. 30 tablet 0     tadalafil (CIALIS) 10 MG tablet Take 1 tablet (10 mg total) by mouth daily as needed for erectile dysfunction. 5 tablet 2     traMADol (ULTRAM) 50 mg tablet Take 1-2 tablets ( mg total) by mouth every 6 (six) hours as needed for pain. 50 tablet 0     No current facility-administered medications for this visit.           Objective:    Vitals:    05/15/19 0828   BP: 120/60   Patient Site: Left Arm   Patient Position: Sitting   Cuff Size: Adult Regular   Pulse: 77   SpO2: 96%   Weight: 138 lb 12.8 oz (63 kg)   Height: 5' 10\" (1.778 m)      Body mass index is 19.92 kg/m .      General Appearance:  Alert, cooperative, no distress, appears stated age   HEENT:  Normal.  No acute findings.   Neck: Supple, symmetrical, no adenopathy.   Lungs:   Clear to auscultation bilaterally, respirations unlabored.  No expiratory wheeze or inspiratory crackles noted.   Heart:  Regular rate and rhythm, S1, S2 normal, no murmur, rub or gallop   Abdomen:   Soft, non-tender, positive bowel sounds, no masses, no organomegaly   Extremities:  Trace edema of left lower extremity.  Pulses, sensation and strength intact.  All other extremities normal.    Skin:  The patient has a raised, crusty appearing lesion, approximately 1 cm on forehead, most consistent with a seborrheic keratosis.  " Skin is otherwise warm, dry.  Skin color, texture, turgor normal, no rashes .   Neurologic:  Alert and oriented x3.  Grossly intact.       Per radiology report on 5/12/2019:  XR Chest:  Lungs clear. No pleural effusion or pneumothorax. Normal heart size and pulmonary vascularity. Thoracolumbar curve. Moderate degenerative changes in the spine including prominent hypertrophic changes lower thoracic level. Aortic calcification and tortuosity. Left shoulder arthroplasty. Report per radiology.       This note has been dictated using voice recognition software. Any grammatical or context distortions are unintentional and inherent to the use of this software.

## 2021-05-30 VITALS — WEIGHT: 139 LBS | BODY MASS INDEX: 19.94 KG/M2

## 2021-05-31 VITALS — WEIGHT: 144.4 LBS | BODY MASS INDEX: 21.39 KG/M2 | HEIGHT: 69 IN

## 2021-05-31 VITALS — BODY MASS INDEX: 21.43 KG/M2 | WEIGHT: 143 LBS

## 2021-05-31 NOTE — TELEPHONE ENCOUNTER
RN cannot approve Refill Request    RN can NOT refill this medication pt requesting to go to 15 mg.       Ching Christian, [facility] Triage/Med Refill 8/14/2019    Requested Prescriptions   Pending Prescriptions Disp Refills     mirtazapine (REMERON) 15 MG tablet [Pharmacy Med Name: MIRTAZAPINE 15 MG TABLET] 90 tablet 3     Sig: TAKE 1 TABLET BY MOUTH EVERYDAY AT BEDTIME       Tricyclics/Misc Antidepressant/Antianxiety Meds Refill Protocol Passed - 8/13/2019 10:56 AM        Passed - PCP or prescribing provider visit in last year     Last office visit with prescriber/PCP: 3/22/2019 Selvin Riley MD OR same dept: 5/15/2019 Lizabeth Velez CNP OR same specialty: 5/15/2019 Lizabeth Velez CNP  Last physical: 3/1/2019 Last MTM visit: Visit date not found   Next visit within 3 mo: Visit date not found  Next physical within 3 mo: Visit date not found  Prescriber OR PCP: Selvin Riley MD  Last diagnosis associated with med order: There are no diagnoses linked to this encounter.  If protocol passes may refill for 12 months if within 3 months of last provider visit (or a total of 15 months).

## 2021-05-31 NOTE — TELEPHONE ENCOUNTER
Refill Approved    Rx renewed per Medication Renewal Policy. Medication was last renewed on 3/15/18.    Ching Christian, [facility] Triage/Med Refill 8/23/2019     Requested Prescriptions   Pending Prescriptions Disp Refills     CIALIS 10 mg tablet [Pharmacy Med Name: CIALIS 10 MG TABLET] 5 tablet 2     Sig: TAKE 1 TABLET (10 MG TOTAL) BY MOUTH DAILY AS NEEDED FOR ERECTILE DYSFUNCTION.       Medications for Impotence Refill Protocol Passed - 8/23/2019  9:38 AM        Passed - PCP or prescribing provider visit in last year     Last office visit with prescriber/PCP: 3/22/2019 Selvin Riley MD OR same dept: 5/15/2019 Lizabeth Velez CNP OR same specialty: 5/15/2019 Lizabeth Velez CNP  Last physical: 3/1/2019 Last MTM visit: Visit date not found   Next visit within 3 mo: Visit date not found  Next physical within 3 mo: Visit date not found  Prescriber OR PCP: Selvin Riley MD  Last diagnosis associated with med order: 1. ED (erectile dysfunction)  - CIALIS 10 mg tablet [Pharmacy Med Name: CIALIS 10 MG TABLET]; TAKE 1 TABLET (10 MG TOTAL) BY MOUTH DAILY AS NEEDED FOR ERECTILE DYSFUNCTION.  Dispense: 5 tablet; Refill: 2    If protocol passes may refill for 12 months if within 3 months of last provider visit (or a total of 15 months).

## 2021-06-02 ENCOUNTER — RECORDS - HEALTHEAST (OUTPATIENT)
Dept: ADMINISTRATIVE | Facility: CLINIC | Age: 86
End: 2021-06-02

## 2021-06-02 VITALS — BODY MASS INDEX: 19.97 KG/M2 | HEIGHT: 70 IN | WEIGHT: 139.5 LBS

## 2021-06-02 VITALS — WEIGHT: 143.4 LBS | BODY MASS INDEX: 20.53 KG/M2 | HEIGHT: 70 IN

## 2021-06-02 VITALS — HEIGHT: 70 IN | BODY MASS INDEX: 20.06 KG/M2 | WEIGHT: 140.1 LBS

## 2021-06-02 VITALS — WEIGHT: 151.1 LBS | BODY MASS INDEX: 21.68 KG/M2

## 2021-06-03 VITALS — WEIGHT: 138.8 LBS | HEIGHT: 70 IN | BODY MASS INDEX: 19.87 KG/M2

## 2021-06-04 VITALS
HEIGHT: 70 IN | WEIGHT: 138.1 LBS | BODY MASS INDEX: 19.77 KG/M2 | DIASTOLIC BLOOD PRESSURE: 62 MMHG | SYSTOLIC BLOOD PRESSURE: 118 MMHG | OXYGEN SATURATION: 98 % | HEART RATE: 65 BPM

## 2021-06-04 NOTE — PROGRESS NOTES
Assessment and Plan:       Patrick was seen today for annual wellness visit.    Pre-diabetes  -     Basic Metabolic Panel  -     Glycosylated Hemoglobin A1c    Routine general medical examination at a health care facility  -     Lipid Cascade    Prostate Cancer  -     PSA, Diagnostic (Prostatic-Specific Antigen)    Anemia  -     HM2(CBC w/o Differential)    Elevated prostate specific antigen (PSA)   -     PSA, Diagnostic (Prostatic-Specific Antigen)    Encounter for general adult medical examination with abnormal findings        The patient's current medical problems were reviewed.      The following health maintenance schedule was reviewed with the patient and provided in printed form in the after visit summary:   Health Maintenance   Topic Date Due     FALL RISK ASSESSMENT  12/05/2019     MEDICARE ANNUAL WELLNESS VISIT  12/05/2019     ZOSTER VACCINES (1 of 2) 12/26/2019     ADVANCE CARE PLANNING  12/05/2023     TD 18+ HE  11/24/2027     PNEUMOCOCCAL IMMUNIZATION 65+ LOW/MEDIUM RISK  Completed     INFLUENZA VACCINE RULE BASED  Completed        Subjective:   Chief Complaint: Patrick Wharton is an 84 y.o. male here for an Annual Wellness visit.   HPI: Overall he reports he is doing well.  He has a history of bipolar disorder.  He has been maintained on mirtazapine.  Had seen a psychiatrist in the past.  Has done very well and has followed more exclusively with primary care over the past several years.  Because of some difficulty with sleep we have increased the dose of mirtazapine more recently.  He reports no difficulties currently.  Nothing to suggest any decompensation with his underlying bipolar disorder.    He does have a history of mild cognitive impairment.  He follows with a dementia specialist.  He will be establishing with a new provider.  He has an appointment scheduled.  On our screening intake exam patient missed 1 out of 3 verbal recall.  Had a little bit of difficulty drawing the hands of a clock and  determining the correct time.  Does not report any perceived increased difficulty overall.  He is aware and insightful to some extent regarding his cognitive impairment.    He has a history of prostate cancer.  His PSA test has been measurable at 0.1 consistently.  Discussed rechecking.    He has osteoarthritis of the shoulder had undergone a shoulder replacement surgery has basically made a good recovery.    He has a history of prediabetes.  Last A1c was just over 6.  Patient is concerned because he has been eating more sweets lately.  His weight is ideal.  He does get a fair amount of physical activity.  Discussed rechecking A1c.        Review of Systems: Please see above.  The rest of the review of systems are negative for all systems.    Patient Care Team:  Selvin Riley MD as PCP - General  Selvin Riley MD as Assigned PCP     Patient Active Problem List   Diagnosis     Memory Lapses Or Loss     Impaired Fasting Glucose     Prostate Cancer     Bipolar Disorder     Benign Prostatic Hypertrophy     Prostate Cancer     Anemia     Foot swelling     MCI (mild cognitive impairment)     Routine general medical examination at a health care facility     Pre-diabetes     Osteoarthritis of left shoulder, unspecified osteoarthritis type     Status post total replacement of left shoulder     Pneumonia due to infectious organism, unspecified laterality, unspecified part of lung     Leg edema, left     Slow transit constipation     Seborrheic keratoses     Past Medical History:   Diagnosis Date     Anemia      Arthritis      Bipolar 1 disorder (H)      BPH (benign prostatic hyperplasia)      History of anesthesia complications     difficult intubation  cant open mouth wide .told should have medical alert bracelet     [place] (hard of hearing)      MCI (mild cognitive impairment)      Prostate CA (H)       Past Surgical History:   Procedure Laterality Date     BACK SURGERY       NC RECONSTR TOTAL SHOULDER IMPLANT Left  3/15/2019    Procedure: LEFT TOTAL SHOULDER ARTHROPLASTY;  Surgeon: Emiliano Monet MD;  Location: [facility] Main OR;  Service: Orthopedics     PROSTATE SURGERY        No family history on file.   Social History     Socioeconomic History     Marital status:      Spouse name: Not on file     Number of children: Not on file     Years of education: Not on file     Highest education level: Not on file   Occupational History     Not on file   Social Needs     Financial resource strain: Not on file     Food insecurity:     Worry: Not on file     Inability: Not on file     Transportation needs:     Medical: Not on file     Non-medical: Not on file   Tobacco Use     Smoking status: Never Smoker     Smokeless tobacco: Never Used   Substance and Sexual Activity     Alcohol use: No     Frequency: Never     Drug use: No     Sexual activity: Not on file   Lifestyle     Physical activity:     Days per week: Not on file     Minutes per session: Not on file     Stress: Not on file   Relationships     Social connections:     Talks on phone: Not on file     Gets together: Not on file     Attends Mandaen service: Not on file     Active member of club or organization: Not on file     Attends meetings of clubs or organizations: Not on file     Relationship status: Not on file     Intimate partner violence:     Fear of current or ex partner: Not on file     Emotionally abused: Not on file     Physically abused: Not on file     Forced sexual activity: Not on file   Other Topics Concern     Not on file   Social History Narrative     Not on file      Current Outpatient Medications   Medication Sig Dispense Refill     acetaminophen (TYLENOL) 500 MG tablet Take 2 tablets (1,000 mg total) by mouth 3 (three) times a day as needed for pain.  0     ferrous sulfate (IRON) 325 (65 FE) MG tablet Take 1 tablet by mouth daily with breakfast.       mirtazapine (REMERON) 30 MG tablet Take 1 tablet (30 mg total) by mouth at bedtime. 90 tablet 3  "    multivitamin (MEN'S MULTI-VITAMIN) per tablet Take 1 tablet by mouth daily.       psyllium (METAMUCIL) 3.4 gram packet Take 1 packet by mouth daily as needed.       No current facility-administered medications for this visit.       Objective:   Vital Signs:   Visit Vitals  /62   Pulse 65   Ht 5' 9.5\" (1.765 m)   Wt 138 lb 1.6 oz (62.6 kg)   SpO2 98%   BMI 20.10 kg/m         VisionScreening:  No exam data present     PHYSICAL EXAM        General Appearance:    Alert, cooperative, no distress   Eyes:   No scleral icterus or conjunctival irritation       Ears:    Normal TM's and external ear canals, both ears   Throat:   Lips, mucosa, and tongue normal; teeth and gums normal   Neck:   Supple, symmetrical, trachea midline, no adenopathy;        thyroid:  No enlargement/tenderness/nodules   Lungs:     Clear to auscultation bilaterally, respirations unlabored, no wheezes or crackles   Heart:    Regular rate and rhythm,  No murmur   Abdomen:    Soft, no distention, no tenderness on palpation, no masses, no organomegaly     Extremities:  No edema, no joint swelling or redness, no evidence of any injuries   Skin:  No concerning skin findings, no suspicious moles, no rashes   Neurologic:  On gross examination there is no motor or sensory deficit.  Patient walks with a normal gait     Anal exam:     External skin tags, no evidence of active external hemorrhoid certainly no thrombosis bleeding or signs of infection.                 Assessment Results 12/16/2019   Activities of Daily Living No help needed   Instrumental Activities of Daily Living No help needed   Get Up and Go Score Less than 12 seconds   Mini Cog Total Score 4   Some recent data might be hidden     A Mini-Cog score of 0-2 suggests the possibility of dementia, score of 3-5 suggests no dementia    Identified Health Risks:     The patient was counseled and encouraged to consider modifying their diet and eating habits. He was provided with information on " recommended healthy diet options.  The patient was provided with written information regarding signs of hearing loss.  Information on urinary incontinence and treatment options given to patient.  Patient's advanced directive was discussed and I am comfortable with the patient's wishes.

## 2021-06-05 VITALS
WEIGHT: 135 LBS | WEIGHT: 135 LBS | BODY MASS INDEX: 19.99 KG/M2 | BODY MASS INDEX: 19.99 KG/M2 | HEIGHT: 69 IN | HEIGHT: 69 IN

## 2021-06-05 VITALS
DIASTOLIC BLOOD PRESSURE: 68 MMHG | HEIGHT: 69 IN | HEART RATE: 66 BPM | WEIGHT: 137.3 LBS | OXYGEN SATURATION: 98 % | BODY MASS INDEX: 20.34 KG/M2 | RESPIRATION RATE: 18 BRPM | SYSTOLIC BLOOD PRESSURE: 120 MMHG

## 2021-06-05 VITALS
HEART RATE: 78 BPM | SYSTOLIC BLOOD PRESSURE: 98 MMHG | DIASTOLIC BLOOD PRESSURE: 58 MMHG | BODY MASS INDEX: 19.6 KG/M2 | WEIGHT: 136.6 LBS | TEMPERATURE: 98 F

## 2021-06-05 VITALS — BODY MASS INDEX: 19.47 KG/M2 | HEIGHT: 70 IN | WEIGHT: 136 LBS

## 2021-06-06 NOTE — TELEPHONE ENCOUNTER
RN cannot approve Refill Request    RN can NOT refill this medication medication not on med list.      Ching Christian, [facility] Triage/Med Refill 3/16/2020    Requested Prescriptions   Pending Prescriptions Disp Refills     tadalafiL (CIALIS) 10 MG tablet [Pharmacy Med Name: TADALAFIL 10 MG TABLET] 5 tablet 2     Sig: TAKE 1 TABLET BY MOUTH DAILY AS NEEDED FOR ERECTILE DYSFUNCTION       Medications for Impotence Refill Protocol Passed - 3/15/2020 12:24 PM        Passed - PCP or prescribing provider visit in last year     Last office visit with prescriber/PCP: 3/22/2019 Selvin Riley MD OR same dept: 5/15/2019 Lizabeth Velez CNP OR same specialty: 5/15/2019 Lizabeth Velez CNP  Last physical: 12/16/2019 Last MTM visit: Visit date not found   Next visit within 3 mo: Visit date not found  Next physical within 3 mo: Visit date not found  Prescriber OR PCP: Selvin Riley MD  Last diagnosis associated with med order: 1. ED (erectile dysfunction)  - tadalafiL (CIALIS) 10 MG tablet [Pharmacy Med Name: TADALAFIL 10 MG TABLET]; TAKE 1 TABLET BY MOUTH DAILY AS NEEDED FOR ERECTILE DYSFUNCTION  Dispense: 5 tablet; Refill: 2    If protocol passes may refill for 12 months if within 3 months of last provider visit (or a total of 15 months).

## 2021-06-08 NOTE — PROGRESS NOTES
Persons accompanying you (the patient) today: Wife: Coleen     How have you been doing since we saw you last? Please note any concerns.  No concerns    Please list any surgeries or procedures you have had since we saw you last:  None    Have you had any falls since your last visit? No    Do you have any pain today? No    With whom do you currently reside? (alone, spouse, family, assisted living, nursing home)  Pt live at home with his wife  If assisted living or nursing home, please note name and location of facility:

## 2021-06-08 NOTE — TELEPHONE ENCOUNTER
Refill Approved    Rx renewed per Medication Renewal Policy. Medication was last renewed on 5/20/19.    Ching Christian, [facility] Triage/Med Refill 5/14/2020     Requested Prescriptions   Pending Prescriptions Disp Refills     mirtazapine (REMERON) 30 MG tablet [Pharmacy Med Name: MIRTAZAPINE 30 MG TABLET] 90 tablet 3     Sig: TAKE 1 TABLET BY MOUTH EVERYDAY AT BEDTIME       Tricyclics/Misc Antidepressant/Antianxiety Meds Refill Protocol Passed - 5/13/2020 12:37 AM        Passed - PCP or prescribing provider visit in last year     Last office visit with prescriber/PCP: 3/22/2019 Selvin Riley MD OR same dept: 5/15/2019 Lizabeth Velez CNP OR same specialty: 5/15/2019 Lizabeth Velez CNP  Last physical: 12/16/2019 Last MTM visit: Visit date not found   Next visit within 3 mo: Visit date not found  Next physical within 3 mo: Visit date not found  Prescriber OR PCP: Selvin Riley MD  Last diagnosis associated with med order: 1. Bipolar I disorder, single manic episode (H)  - mirtazapine (REMERON) 30 MG tablet [Pharmacy Med Name: MIRTAZAPINE 30 MG TABLET]; TAKE 1 TABLET BY MOUTH EVERYDAY AT BEDTIME  Dispense: 90 tablet; Refill: 3    If protocol passes may refill for 12 months if within 3 months of last provider visit (or a total of 15 months).

## 2021-06-08 NOTE — PROGRESS NOTES
Assessment / Impression   1.  Amnestic mild cognitive impairment      Plan:   1.  Repeat neuropsychometric testing in one year  2.  I did discuss with the patient the option of empirical therapy with cholinesterase inhibitor therapy as well as other biomarkers testing that could be considered.  At this point in time I'm not recommending further biomarkers testing but I do strongly recommend repeat neuropsychometric testing in one year.  After much discussion the patient also appears disinclined to consider empiric therapy with cholinesterase inhibitor.    A long conversation was held with the patient and spouse in attendance.  This patient appears to have mild cognitive impairment Limited to nonverbal information recall.  Based on his age current reports and results of diagnostic testing I do believe that Alzheimers process is likely.  I did discuss the outcomes related to mild cognitive impairment and did discuss the fact that some uncertainty  exists at this point in time with respect to the diagnosis.  QUESTIONS were answered.  Total time for evaluation was 1 hour with the majority of time spent in counseling and care coordination.  The patient wishes to simply follow up with me following his repeat neuropsychometric testing otherwise he can follow up as needed.      Subjective:     HPI: Patrick Wharton is a 81 y.o. male with  above diagnoses who returns for diagnostic evaluation review.  Neuropsychometric testing does reveal some difficulty with new learning of nonverbal information as well as some subtle challenges in prefrontal function.  Screening blood tests are within normal limits.  MRI of the brain reveals mild generalized atrophy with no focal predominance noted.  There has been progression in atrophy compared with the scan performed in 2010.    The patient's wife accompanies the patient today and she does tend to corroborate the patient's story although I note that the spouse herself may be suffering  from a cognitive disorder as well.  The patient offers no new complaints at this time.          Review of Systems:          As above        Objective:     Vitals:    01/13/17 0957 01/13/17 0958   BP: 121/65 135/67   Pulse: 78 76   Weight: 139 lb (63 kg)        No results found for this or any previous visit (from the past 24 hour(s)).    Physical Exam: As noted previously

## 2021-06-13 NOTE — PROGRESS NOTES
HPI:  Patrick Wharton is a 85 y.o. male who was referred to me by Selvin Riley MD for an inguinal hernia. He  presents today with complaints of a left inguinal hernia that suddenly appeared several weeks ago when lifting some boxes at a food shelf.  Notes that it has been uncomfortable since it appeared and is for the most part protruding out.  Denies any issues on the right side    Allergies:Patient has no known allergies.    Past Medical History:   Diagnosis Date     Anemia      Arthritis      Bipolar 1 disorder (H)      BPH (benign prostatic hyperplasia)      History of anesthesia complications     difficult intubation  cant open mouth wide .told should have medical alert bracelet     [place] (hard of hearing)      MCI (mild cognitive impairment)      Prostate CA (H)        Past Surgical History:   Procedure Laterality Date     BACK SURGERY       AL RECONSTR TOTAL SHOULDER IMPLANT Left 3/15/2019    Procedure: LEFT TOTAL SHOULDER ARTHROPLASTY;  Surgeon: Emiliano Monet MD;  Location: [facility];  Service: Orthopedics     PROSTATE SURGERY         CURRENT MEDS:  Current Outpatient Medications   Medication Sig Dispense Refill     acetaminophen (TYLENOL) 500 MG tablet Take 2 tablets (1,000 mg total) by mouth 3 (three) times a day as needed for pain.  0     divalproex (DEPAKOTE DR) 250 MG 12 hour tablet Take 250 mg by mouth daily.       ferrous sulfate (IRON) 325 (65 FE) MG tablet Take 1 tablet by mouth daily with breakfast.       mirtazapine (REMERON) 30 MG tablet TAKE 1 TABLET BY MOUTH EVERYDAY AT BEDTIME 90 tablet 2     multivitamin (MEN'S MULTI-VITAMIN) per tablet Take 1 tablet by mouth daily.       psyllium (METAMUCIL) 3.4 gram packet Take 1 packet by mouth daily as needed.       tadalafiL (CIALIS) 10 MG tablet Take 1 tablet (10 mg total) by mouth daily as needed for erectile dysfunction. 5 tablet 2     No current facility-administered medications for this visit.        No family history on file.      "reports that he has never smoked. He has never used smokeless tobacco. He reports that he does not drink alcohol or use drugs.    Review of Systems -   The 10 point review of systems  is within normal limits except for as mentioned above in the HPI.  General ROS: No complaints or constitutional symptoms  Skin: No complaints or symptoms   Hematologic/Lymphatic: No symptoms or complaints  Psychiatric: No symptoms or complaints  Endocrine: No excessive fatigue, no hypermetabolic symptoms reported  Respiratory ROS: no cough, shortness of breath, or wheezing  Cardiovascular ROS: no chest pain or dyspnea on exertion  Gastrointestinal ROS: As per HPI  Musculoskeletal ROS: no recent injuries reported  Neurological ROS: no focal neurologic defects reported.        Ht 5' 9.5\" (1.765 m)   Wt 136 lb (61.7 kg)   BMI 19.80 kg/m    Body mass index is 19.8 kg/m .    EXAM:  General : Alert, cooperative, appears stated age   Skin: Skin color, texture, turgor normal, no rashes or lesions   Lymphatic: No obvious adenopathy, no swelling   Eyes: No scleral icterus, pupils equal  HENT: no traumatic injury to the head or face, no gross abnormalities  Lungs: Normal respiratory effort, breath sounds equal bilaterally  Heart: Regular rate and rhythm  Abdomen: Moderate sized left inguinal hernia  Musculoskeletal: No obvious swelling,  Neurologic: Grossly intact        Assessment/Plan:   1. Non-recurrent unilateral inguinal hernia without obstruction or gangrene        Patrick Wharton is a 85 y.o. male with a left inguinal hernia.  I have discussed the pathophysiology of inguinal hernias at length as well as the  surgical and non-operative management strategies.      In particular, the risks and benefits of laparoscopic vs. open inguinal hernia surgery were explained in detail which include, but are not limited to, bleeding, infection of the mesh, recurrence of the hernia, chronic pain, poor cosmesis, the need for reoperative intervention, the " possibility of conversion from a laparoscopic approach to an open approach, subcutaneous emphysema, injury to vital structures,  blood clots, heart attack, stroke and death.  Additionally, the risks of observation were also discussed in detail which include, but are not limited to, chronic pain, enlargement of the hernia, incarceration, strangulation and death.      He understands everything that was discussed and has consented to proceed with surgery.   We will plan on scheduling an open left inguinal hernia repair at his desired date.       Kevin Banks MD, FACS  Office: [phone]  [facility]   General and Bariatric Surgery

## 2021-06-13 NOTE — PROGRESS NOTES
Assessment and Plan:     1. Left inguinal hernia  Ambulatory referral to General Surgery     Will refer to general surgery for further evaluation.  Discussed symptomatic treatment.  He is to avoid heavy lifting in the meantime.  He is content with the plan.    Subjective:     Patrick is a 85 y.o. male presenting to the clinic with his wife for concerns for a lump that he noticed within his left pelvic region.  Patient noticed this while showering last Monday.  He states the lump has not increased in size.  He denies any pain.  He does occasionally suffer from constipation and uses Metamucil.  He denies any recent heavy lifting or straining with bowel movements.  He denies dysuria, hematuria, low back pain, fever.  He has not had any recent cold symptoms or cough.    Review of Systems: A complete 14 point review of systems was obtained and is negative or as stated in the history of present illness.    Social History     Socioeconomic History     Marital status:      Spouse name: Not on file     Number of children: Not on file     Years of education: Not on file     Highest education level: Not on file   Occupational History     Not on file   Social Needs     Financial resource strain: Not on file     Food insecurity     Worry: Not on file     Inability: Not on file     Transportation needs     Medical: Not on file     Non-medical: Not on file   Tobacco Use     Smoking status: Never Smoker     Smokeless tobacco: Never Used   Substance and Sexual Activity     Alcohol use: No     Frequency: Never     Drug use: No     Sexual activity: Not on file   Lifestyle     Physical activity     Days per week: Not on file     Minutes per session: Not on file     Stress: Not on file   Relationships     Social connections     Talks on phone: Not on file     Gets together: Not on file     Attends Jew service: Not on file     Active member of club or organization: Not on file     Attends meetings of clubs or organizations: Not  on file     Relationship status: Not on file     Intimate partner violence     Fear of current or ex partner: Not on file     Emotionally abused: Not on file     Physically abused: Not on file     Forced sexual activity: Not on file   Other Topics Concern     Not on file   Social History Narrative     Not on file       Active Ambulatory Problems     Diagnosis Date Noted     Memory Lapses Or Loss      Impaired Fasting Glucose      Prostate Cancer      Bipolar Disorder      Benign Prostatic Hypertrophy      Prostate Cancer      Anemia      Foot swelling 06/03/2016     MCI (mild cognitive impairment) 11/24/2017     Routine general medical examination at a health care facility 12/05/2018     Pre-diabetes 12/05/2018     Osteoarthritis of left shoulder, unspecified osteoarthritis type 12/05/2018     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     Pneumonia due to infectious organism, unspecified laterality, unspecified part of lung 05/15/2019     Leg edema, left 05/15/2019     Slow transit constipation 05/15/2019     Seborrheic keratoses 05/15/2019     Resolved Ambulatory Problems     Diagnosis Date Noted     No Resolved Ambulatory Problems     Past Medical History:   Diagnosis Date     Anemia      Arthritis      Bipolar 1 disorder (H)      BPH (benign prostatic hyperplasia)      History of anesthesia complications      [place] (hard of hearing)      Prostate CA (H)        No family history on file.    Objective:     BP 98/58 (Patient Site: Right Arm, Patient Position: Sitting, Cuff Size: Adult Regular)   Pulse 78   Temp 98  F (36.7  C)   Wt 136 lb 9.6 oz (62 kg)   BMI 19.88 kg/m      Patient is alert, in no obvious distress.   Skin: Warm, dry.    Lungs:  Clear to auscultation. Respirations even and unlabored.  No wheezing or rales noted.   Heart:  Regular rate and rhythm.  No murmurs, S3, S4, gallops, or rubs.    Abdomen: Soft, nontender.  No organomegaly. Bowel sounds normoactive. No guarding or masses  noted.   :  He has a large visible bulge within his left inguinal canal consistent with an inguinal hernia.

## 2021-06-13 NOTE — PROGRESS NOTES
[facility]  [address]  [facility] [address]  Dept: [phone]  Dept Fax: [phone]  Primary Provider: Gisele Calloway MD  Pre-op Performing Provider: GISELE CALLOWAY    PREOPERATIVE EVALUATION:  Today's date: 12/18/2020    Patrick Wharton is a 85 y.o. male who presents for a preoperative evaluation.    Surgical Information:  Surgery/Procedure: HERNIORRHAPHY, INGUINAL, OPEN  Surgery Location: [facility]  Surgeon: DR Banks  Surgery Date: 12/23/20  Time of Surgery:   Where patient plans to recover: At home with family  Fax number for surgical facility:    Type of Anesthesia Anticipated: to be determined      HPI related to upcoming procedure:     He has a left-sided inguinal hernia and needs surgical treatment.    He has a history of bipolar disorder, he has been maintained on mirtazapine.  He had seen psychiatry in the past but has done well over numerous years and no longer sees psychiatry.  Reports no concerns with his mental health currently.    He has a history of mild cognitive impairment.  He has followed with a dementia specialist.  He does not report any significant evolving concerns related to his mild cognitive impairment.  He and his wife function well at home independently.    He has a history of prostate cancer, he was treated with prostatectomy greater than 5 years ago.  His PSA has been measurable at 0.1 consistently over the past 5 years, last year it increased to 0.2.  We discussed that this may require further follow-up as an increasing PSA suggests possibility of recurrence.  Discussed continuing to follow PSA test results for now.    He has a history of osteoarthritis and has had shoulder replacement surgery.    He has a history of prediabetes, A1c was 5.9 when last checked in December 2019.  Today's A1c is 5.5 representing an improvement.    He has a mild chronic and stable anemia with a hemoglobin between 13 and 14.    He reports no signs or  symptoms of an acute illness.    Good exercise tolerance.    He has no history of adverse reactions to anesthesia.  He has no history of a blood clot or bleeding disorder.      Have you ever had a heart attack or stroke? No  Have you ever had surgery on your heart or blood vessels, such as a stent, coronary (heart) bypass, or surgery on an artery in the head, neck, heart, or legs? No  Do you have chest pain when you are physically active? No  Do you have a history of heart failure? No  Do you currently have a cold, bronchitis, or symptoms of other respiratory (head and chest) infections? No  Do you have a cough, shortness of breath, or wheezing? No  Do you or anyone in your family have a history of blood clots? No  Do you or anyone in your family have a serious bleeding problem, such as long-lasting bleeding after surgeries or cuts? No  Have you ever had anemia or been told to take iron pills? YES: ferrous sulfate daily  Have you had any abnormal blood loss such as black, tarry or bloody stools, or abnormal vaginal bleeding?No  Have you ever had a blood transfusion? No   Are you willing to have a blood transfusion if it is medically needed before, during, or after your surgery? Yes  Have you or anyone in your family ever had problems with anesthesia (sedation for surgery)? No  Do you have sleep apnea, excessive snoring, or daytime drowsiness? No  Do you have any artifical heart valves or other implanted medical devices, such as a pacemaker, defibrillator, or continuous glucose monitor? No  Do you have any artifical joints? Left shoulder, 2 titanium rods in his back.  Are you allergic to latex? No  Is there any chance that you may be pregnant? No    Patient has a Health Care Directive or Living Will:  Yes  70}  See problem list for active medical problems.  Problems all longstanding and stable, except as noted/documented.  See ROS for pertinent symptoms related to these conditions.      Review of  Systems  Constitutional, neuro, ENT, endocrine, pulmonary, cardiac, gastrointestinal, genitourinary, musculoskeletal, integument and psychiatric systems are negative, except as otherwise noted.      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     Routine general medical examination at a health care facility 12/05/2018     Pre-diabetes 12/05/2018     Osteoarthritis of left shoulder, unspecified osteoarthritis type 12/05/2018     MCI (mild cognitive impairment) 11/24/2017     Foot swelling 06/03/2016     Anemia      Memory Lapses Or Loss      Impaired Fasting Glucose      Prostate Cancer      Bipolar Disorder      Benign Prostatic Hypertrophy      Prostate Cancer      Past Medical History:   Diagnosis Date     Anemia      Arthritis      Bipolar 1 disorder (H)      BPH (benign prostatic hyperplasia)      History of anesthesia complications     difficult intubation  cant open mouth wide .told should have medical alert bracelet     [place] (hard of hearing)      MCI (mild cognitive impairment)      Prostate CA (H)      Past Surgical History:   Procedure Laterality Date     BACK SURGERY       [place] RECONSTR TOTAL SHOULDER IMPLANT Left 3/15/2019    Procedure: LEFT TOTAL SHOULDER ARTHROPLASTY;  Surgeon: Emiliano Monet MD;  Location: [facility];  Service: Orthopedics     PROSTATE SURGERY       Current Outpatient Medications   Medication Sig Dispense Refill     acetaminophen (TYLENOL) 500 MG tablet Take 2 tablets (1,000 mg total) by mouth 3 (three) times a day as needed for pain.  0     divalproex (DEPAKOTE DR) 250 MG 12  "hour tablet Take 250 mg by mouth daily.       ferrous sulfate (IRON) 325 (65 FE) MG tablet Take 1 tablet by mouth daily with breakfast.       mirtazapine (REMERON) 30 MG tablet TAKE 1 TABLET BY MOUTH EVERYDAY AT BEDTIME 90 tablet 2     multivitamin (MEN'S MULTI-VITAMIN) per tablet Take 1 tablet by mouth daily.       psyllium (METAMUCIL) 3.4 gram packet Take 1 packet by mouth daily as needed.       tadalafiL (CIALIS) 10 MG tablet Take 1 tablet (10 mg total) by mouth daily as needed for erectile dysfunction. 5 tablet 2     No current facility-administered medications for this visit.        No Known Allergies    Social History     Tobacco Use     Smoking status: Never Smoker     Smokeless tobacco: Never Used   Substance Use Topics     Alcohol use: No     Frequency: Never      No family history on file.  Social History     Substance and Sexual Activity   Drug Use No        Objective     /68   Pulse 66   Resp 18   Ht 5' 9\" (1.753 m)   Wt 137 lb 4.8 oz (62.3 kg)   SpO2 98%   BMI 20.28 kg/m    Physical Exam    GENERAL APPEARANCE: healthy, alert and no distress     EYES: EOMI,  PERRL     HENT: Right-sided hearing aid.  No lesions in the mouth or pharynx, removable partial in the lower jaw front no other abnormal dentition     NECK: no adenopathy, no asymmetry, masses, or scars and thyroid normal to palpation     RESP: lungs clear to auscultation - no rales, rhonchi or wheezes     CV: regular rates and rhythm, normal S1 S2, no S3 or S4 and no murmur, click or rub     ABDOMEN:  soft, nontender, no HSM or masses and bowel sounds normal     MS: extremities normal- no gross deformities noted, no evidence of inflammation in joints, FROM in all extremities.     SKIN: no suspicious lesions or rashes, an impressive amount of seborrheic keratoses on the back     NEURO: Normal strength and tone, sensory exam grossly normal, mentation intact and speech normal     PSYCH: mentation appears normal. and affect normal/bright     " LYMPHATICS: No cervical adenopathy    Recent Results (from the past 24 hour(s))   Glycosylated Hemoglobin A1c    Collection Time: 12/18/20  9:40 AM   Result Value Ref Range    Hemoglobin A1c 5.5 <=5.6 %   HM2(CBC w/o Differential)    Collection Time: 12/18/20  9:40 AM   Result Value Ref Range    WBC 6.7 4.0 - 11.0 thou/uL    RBC 3.98 (L) 4.40 - 6.20 mill/uL    Hemoglobin 13.2 (L) 14.0 - 18.0 g/dL    Hematocrit 39.3 (L) 40.0 - 54.0 %    MCV 99 80 - 100 fL    MCH 33.2 27.0 - 34.0 pg    MCHC 33.6 32.0 - 36.0 g/dL    RDW 12.7 11.0 - 14.5 %    Platelets 220 140 - 440 thou/uL    MPV 7.2 7.0 - 10.0 fL   Electrocardiogram Perform and Read    Collection Time: 12/18/20  9:54 AM   Result Value Ref Range    SYSTOLIC BLOOD PRESSURE      DIASTOLIC BLOOD PRESSURE      VENTRICULAR RATE 70 BPM    ATRIAL RATE 70 BPM    P-R INTERVAL 160 ms    QRS DURATION 84 ms    Q-T INTERVAL 384 ms    QTC CALCULATION (BEZET) 414 ms    P Axis 54 degrees    R AXIS 26 degrees    T AXIS 45 degrees    MUSE DIAGNOSIS       Normal sinus rhythm  Normal ECG  When compared with ECG of 01-MAR-2019 16:17,  Nonspecific T wave abnormality no longer evident in Inferior leads  Confirmed by KALLIE MARTIN MD LOC:[facility] ([id number]) on 12/18/2020 10:11:39 AM          PRE-OP Diagnostics:   Labs pending at this time. Results will be reviewed when available.  EKG: appears normal, NSR, 70 bpm, no concerning findings    REVISED CARDIAC RISK INDEX (RCRI)   The patient has the following serious cardiovascular risks for perioperative complications:   - No serious cardiac risks = 0 points    RCRI INTERPRETATION: 0 points: Class I (very low risk - 0.4% complication rate)      Assessment & Plan      The proposed surgical procedure is considered LOW risk.    Patrick was seen today for annual wellness visit and pre-op exam.    Diagnoses and all orders for this visit:    Preop general physical exam  -     Basic Metabolic Panel  -     HM2(CBC w/o Differential)  -     Electrocardiogram  Perform and Read    Pre-diabetes  -     Glycosylated Hemoglobin A1c    Routine general medical examination at a health care facility  -     Basic Metabolic Panel    Left inguinal hernia    Prostate Cancer  -     PSA, Diagnostic (Prostatic-Specific Antigen)    MCI (mild cognitive impairment)    Dyslipidemia  -     Lipid Cascade    Elevated prostate specific antigen (PSA)   -     PSA, Diagnostic (Prostatic-Specific Antigen)      Risks and Recommendations:  The patient has the following additional risks and recommendations for perioperative complications:   - No identified additional risk factors other than previously addressed    Medication Instructions:  Patient is to take all scheduled medications on the day of surgery EXCEPT for modifications listed below:    RECOMMENDATION:  APPROVAL GIVEN to proceed with proposed procedure, without further diagnostic evaluation.    Signed Electronically by: Selvin Riley MD    Health Maintenance   Topic Date Due     MEDICARE ANNUAL WELLNESS VISIT  12/18/2021     FALL RISK ASSESSMENT  12/18/2021     ADVANCE CARE PLANNING  12/18/2025     TD 18+ HE  11/24/2027     Pneumococcal Vaccine: 65+ Years  Completed     INFLUENZA VACCINE RULE BASED  Completed     ZOSTER VACCINES  Completed     Pneumococcal Vaccine: Pediatrics (0 to 5 Years) and At-Risk Patients (6 to 64 Years)  Aged Out     HEPATITIS B VACCINES  Aged Out      Assessment Results 12/18/2020   Activities of Daily Living No help needed   Instrumental Activities of Daily Living No help needed   Get Up and Go Score -   Mini Cog Total Score 5   Some recent data might be hidden     A Mini-Cog score of 0-2 suggests the possibility of dementia, score of 3-5 suggests no dementia    Identified Health Risks:     The patient was counseled and encouraged to consider modifying their diet and eating habits. He was provided with information on recommended healthy diet options.  The patient reports that he does not have all recommended  working emergency equipment available. He was provided with information about emergency preparedness, including smoke detectors.  The patient was provided with written information regarding signs of hearing loss.  Information on urinary incontinence and treatment options given to patient.  Patient's advanced directive was discussed and I am comfortable with the patient's wishes.

## 2021-06-13 NOTE — TELEPHONE ENCOUNTER
Refill Approved    Rx renewed per Medication Renewal Policy. Medication was last renewed on 3/16/20, oast OV 12/16/19.    Kathy Coleman, [facility] Triage/Med Refill 11/22/2020     Requested Prescriptions   Pending Prescriptions Disp Refills     tadalafiL (CIALIS) 10 MG tablet [Pharmacy Med Name: TADALAFIL 10 MG TABLET] 5 tablet 2     Sig: TAKE 1 TABLET BY MOUTH DAILY AS NEEDED FOR ERECTILE DYSFUNCTION       Medications for Impotence Refill Protocol Passed - 11/21/2020  1:12 PM        Passed - PCP or prescribing provider visit in last year     Last office visit with prescriber/PCP: 3/22/2019 Selvin Riley MD OR same dept: Visit date not found OR same specialty: 5/15/2019 Lizabeth Velez, [facility]  Last physical: 12/16/2019 Last MTM visit: Visit date not found   Next visit within 3 mo: Visit date not found  Next physical within 3 mo: Visit date not found  Prescriber OR PCP: Selvin Riley MD  Last diagnosis associated with med order: 1. ED (erectile dysfunction)  - tadalafiL (CIALIS) 10 MG tablet [Pharmacy Med Name: TADALAFIL 10 MG TABLET]; TAKE 1 TABLET BY MOUTH DAILY AS NEEDED FOR ERECTILE DYSFUNCTION  Dispense: 5 tablet; Refill: 2    If protocol passes may refill for 12 months if within 3 months of last provider visit (or a total of 15 months).

## 2021-06-13 NOTE — PATIENT INSTRUCTIONS - HE
Hernia education & surgery packet provided to pt.    JUAN Banks [facility]  P: [phone] I F: [phone]

## 2021-06-14 NOTE — ANESTHESIA POSTPROCEDURE EVALUATION
Patient: Patrick Wharton  Procedure(s):  HERNIORRHAPHY, INGUINAL, OPEN (Left)  Anesthesia type: MAC    Patient location: Phase II Recovery  Last vitals:   Vitals Value Taken Time   /75 12/23/20 1045   Temp 36.8  C (98.3  F) 12/23/20 1004   Pulse 71 12/23/20 1047   Resp 14 12/23/20 1004   SpO2 100 % 12/23/20 1047   Vitals shown include unvalidated device data.  Post vital signs: stable  Level of consciousness: awake, alert and oriented  Post-anesthesia pain: pain controlled  Post-anesthesia nausea and vomiting: no  Pulmonary: unassisted, return to baseline  Cardiovascular: stable and blood pressure at baseline  Hydration: adequate  Anesthetic events: no    QCDR Measures:  ASA# 11 - Rocio-op Cardiac Arrest: ASA11B - Patient did NOT experience unanticipated cardiac arrest  ASA# 12 - Rocio-op Mortality Rate: ASA12B - Patient did NOT die  ASA# 13 - PACU Re-Intubation Rate: NA - No ETT / LMA used for case  ASA# 10 - Composite Anes Safety: ASA10A - No serious adverse event    Additional Notes:

## 2021-06-14 NOTE — ANESTHESIA CARE TRANSFER NOTE
Last vitals:   Vitals:    12/23/20 1004   BP: 121/85   Pulse: 82   Resp: 14   Temp: 36.8  C (98.3  F)   SpO2: 98%     Patient's level of consciousness is drowsy  Spontaneous respirations: yes  Maintains airway independently: yes  Dentition unchanged: yes  Oropharynx: oropharynx clear of all foreign objects    QCDR Measures:  ASA# 20 - Surgical Safety Checklist: WHO surgical safety checklist completed prior to induction    PQRS# 430 - Adult PONV Prevention: 4558F - Pt received => 2 anti-emetic agents (different classes) preop & intraop  ASA# 8 - Peds PONV Prevention: NA - Not pediatric patient, not GA or 2 or more risk factors NOT present  PQRS# 424 - Rocio-op Temp Management: 4559F - At least one body temp DOCUMENTED => 35.5C or 95.9F within required timeframe  PQRS# 426 - PACU Transfer Protocol: - Transfer of care checklist used  ASA# 14 - Acute Post-op Pain: ASA14B - Patient did NOT experience pain >= 7 out of 10

## 2021-06-14 NOTE — H&P (VIEW-ONLY)
[facility]  [address]  [facility] [address]  Dept: [phone]  Dept Fax: [phone]  Primary Provider: Gisele Calloway MD  Pre-op Performing Provider: GISELE CALLOWAY    PREOPERATIVE EVALUATION:  Today's date: 12/18/2020    Patrick Wharton is a 85 y.o. male who presents for a preoperative evaluation.    Surgical Information:  Surgery/Procedure: HERNIORRHAPHY, INGUINAL, OPEN  Surgery Location: [facility]  Surgeon: DR Banks  Surgery Date: 12/23/20  Time of Surgery:   Where patient plans to recover: At home with family  Fax number for surgical facility:    Type of Anesthesia Anticipated: to be determined      HPI related to upcoming procedure:     He has a left-sided inguinal hernia and needs surgical treatment.    He has a history of bipolar disorder, he has been maintained on mirtazapine.  He had seen psychiatry in the past but has done well over numerous years and no longer sees psychiatry.  Reports no concerns with his mental health currently.    He has a history of mild cognitive impairment.  He has followed with a dementia specialist.  He does not report any significant evolving concerns related to his mild cognitive impairment.  He and his wife function well at home independently.    He has a history of prostate cancer, he was treated with prostatectomy greater than 5 years ago.  His PSA has been measurable at 0.1 consistently over the past 5 years, last year it increased to 0.2.  We discussed that this may require further follow-up as an increasing PSA suggests possibility of recurrence.  Discussed continuing to follow PSA test results for now.    He has a history of osteoarthritis and has had shoulder replacement surgery.    He has a history of prediabetes, A1c was 5.9 when last checked in December 2019.  Today's A1c is 5.5 representing an improvement.    He has a mild chronic and stable anemia with a hemoglobin between 13 and 14.    He reports no signs or  symptoms of an acute illness.    Good exercise tolerance.    He has no history of adverse reactions to anesthesia.  He has no history of a blood clot or bleeding disorder.      Have you ever had a heart attack or stroke? No  Have you ever had surgery on your heart or blood vessels, such as a stent, coronary (heart) bypass, or surgery on an artery in the head, neck, heart, or legs? No  Do you have chest pain when you are physically active? No  Do you have a history of heart failure? No  Do you currently have a cold, bronchitis, or symptoms of other respiratory (head and chest) infections? No  Do you have a cough, shortness of breath, or wheezing? No  Do you or anyone in your family have a history of blood clots? No  Do you or anyone in your family have a serious bleeding problem, such as long-lasting bleeding after surgeries or cuts? No  Have you ever had anemia or been told to take iron pills? YES: ferrous sulfate daily  Have you had any abnormal blood loss such as black, tarry or bloody stools, or abnormal vaginal bleeding?No  Have you ever had a blood transfusion? No   Are you willing to have a blood transfusion if it is medically needed before, during, or after your surgery? Yes  Have you or anyone in your family ever had problems with anesthesia (sedation for surgery)? No  Do you have sleep apnea, excessive snoring, or daytime drowsiness? No  Do you have any artifical heart valves or other implanted medical devices, such as a pacemaker, defibrillator, or continuous glucose monitor? No  Do you have any artifical joints? Left shoulder, 2 titanium rods in his back.  Are you allergic to latex? No  Is there any chance that you may be pregnant? No    Patient has a Health Care Directive or Living Will:  Yes  70}  See problem list for active medical problems.  Problems all longstanding and stable, except as noted/documented.  See ROS for pertinent symptoms related to these conditions.      Review of  Systems  Constitutional, neuro, ENT, endocrine, pulmonary, cardiac, gastrointestinal, genitourinary, musculoskeletal, integument and psychiatric systems are negative, except as otherwise noted.      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     Routine general medical examination at a health care facility 12/05/2018     Pre-diabetes 12/05/2018     Osteoarthritis of left shoulder, unspecified osteoarthritis type 12/05/2018     MCI (mild cognitive impairment) 11/24/2017     Foot swelling 06/03/2016     Anemia      Memory Lapses Or Loss      Impaired Fasting Glucose      Prostate Cancer      Bipolar Disorder      Benign Prostatic Hypertrophy      Prostate Cancer      Past Medical History:   Diagnosis Date     Anemia      Arthritis      Bipolar 1 disorder (H)      BPH (benign prostatic hyperplasia)      History of anesthesia complications     difficult intubation  cant open mouth wide .told should have medical alert bracelet     [place] (hard of hearing)      MCI (mild cognitive impairment)      Prostate CA (H)      Past Surgical History:   Procedure Laterality Date     BACK SURGERY       GA RECONSTR TOTAL SHOULDER IMPLANT Left 3/15/2019    Procedure: LEFT TOTAL SHOULDER ARTHROPLASTY;  Surgeon: Emiliano Monet MD;  Location: [facility];  Service: Orthopedics     PROSTATE SURGERY       Current Outpatient Medications   Medication Sig Dispense Refill     acetaminophen (TYLENOL) 500 MG tablet Take 2 tablets (1,000 mg total) by mouth 3 (three) times a day as needed for pain.  0     divalproex (DEPAKOTE DR) 250 MG 12  "hour tablet Take 250 mg by mouth daily.       ferrous sulfate (IRON) 325 (65 FE) MG tablet Take 1 tablet by mouth daily with breakfast.       mirtazapine (REMERON) 30 MG tablet TAKE 1 TABLET BY MOUTH EVERYDAY AT BEDTIME 90 tablet 2     multivitamin (MEN'S MULTI-VITAMIN) per tablet Take 1 tablet by mouth daily.       psyllium (METAMUCIL) 3.4 gram packet Take 1 packet by mouth daily as needed.       tadalafiL (CIALIS) 10 MG tablet Take 1 tablet (10 mg total) by mouth daily as needed for erectile dysfunction. 5 tablet 2     No current facility-administered medications for this visit.        No Known Allergies    Social History     Tobacco Use     Smoking status: Never Smoker     Smokeless tobacco: Never Used   Substance Use Topics     Alcohol use: No     Frequency: Never      No family history on file.  Social History     Substance and Sexual Activity   Drug Use No        Objective     /68   Pulse 66   Resp 18   Ht 5' 9\" (1.753 m)   Wt 137 lb 4.8 oz (62.3 kg)   SpO2 98%   BMI 20.28 kg/m    Physical Exam    GENERAL APPEARANCE: healthy, alert and no distress     EYES: EOMI,  PERRL     HENT: Right-sided hearing aid.  No lesions in the mouth or pharynx, removable partial in the lower jaw front no other abnormal dentition     NECK: no adenopathy, no asymmetry, masses, or scars and thyroid normal to palpation     RESP: lungs clear to auscultation - no rales, rhonchi or wheezes     CV: regular rates and rhythm, normal S1 S2, no S3 or S4 and no murmur, click or rub     ABDOMEN:  soft, nontender, no HSM or masses and bowel sounds normal     MS: extremities normal- no gross deformities noted, no evidence of inflammation in joints, FROM in all extremities.     SKIN: no suspicious lesions or rashes, an impressive amount of seborrheic keratoses on the back     NEURO: Normal strength and tone, sensory exam grossly normal, mentation intact and speech normal     PSYCH: mentation appears normal. and affect normal/bright     " LYMPHATICS: No cervical adenopathy    Recent Results (from the past 24 hour(s))   Glycosylated Hemoglobin A1c    Collection Time: 12/18/20  9:40 AM   Result Value Ref Range    Hemoglobin A1c 5.5 <=5.6 %   HM2(CBC w/o Differential)    Collection Time: 12/18/20  9:40 AM   Result Value Ref Range    WBC 6.7 4.0 - 11.0 thou/uL    RBC 3.98 (L) 4.40 - 6.20 mill/uL    Hemoglobin 13.2 (L) 14.0 - 18.0 g/dL    Hematocrit 39.3 (L) 40.0 - 54.0 %    MCV 99 80 - 100 fL    MCH 33.2 27.0 - 34.0 pg    MCHC 33.6 32.0 - 36.0 g/dL    RDW 12.7 11.0 - 14.5 %    Platelets 220 140 - 440 thou/uL    MPV 7.2 7.0 - 10.0 fL   Electrocardiogram Perform and Read    Collection Time: 12/18/20  9:54 AM   Result Value Ref Range    SYSTOLIC BLOOD PRESSURE      DIASTOLIC BLOOD PRESSURE      VENTRICULAR RATE 70 BPM    ATRIAL RATE 70 BPM    P-R INTERVAL 160 ms    QRS DURATION 84 ms    Q-T INTERVAL 384 ms    QTC CALCULATION (BEZET) 414 ms    P Axis 54 degrees    R AXIS 26 degrees    T AXIS 45 degrees    MUSE DIAGNOSIS       Normal sinus rhythm  Normal ECG  When compared with ECG of 01-MAR-2019 16:17,  Nonspecific T wave abnormality no longer evident in Inferior leads  Confirmed by KALLIE MARTIN MD LOC:[facility] ([id number]) on 12/18/2020 10:11:39 AM          PRE-OP Diagnostics:   Labs pending at this time. Results will be reviewed when available.  EKG: appears normal, NSR, 70 bpm, no concerning findings    REVISED CARDIAC RISK INDEX (RCRI)   The patient has the following serious cardiovascular risks for perioperative complications:   - No serious cardiac risks = 0 points    RCRI INTERPRETATION: 0 points: Class I (very low risk - 0.4% complication rate)      Assessment & Plan      The proposed surgical procedure is considered LOW risk.    Patrick was seen today for annual wellness visit and pre-op exam.    Diagnoses and all orders for this visit:    Preop general physical exam  -     Basic Metabolic Panel  -     HM2(CBC w/o Differential)  -     Electrocardiogram  Perform and Read    Pre-diabetes  -     Glycosylated Hemoglobin A1c    Routine general medical examination at a health care facility  -     Basic Metabolic Panel    Left inguinal hernia    Prostate Cancer  -     PSA, Diagnostic (Prostatic-Specific Antigen)    MCI (mild cognitive impairment)    Dyslipidemia  -     Lipid Cascade    Elevated prostate specific antigen (PSA)   -     PSA, Diagnostic (Prostatic-Specific Antigen)      Risks and Recommendations:  The patient has the following additional risks and recommendations for perioperative complications:   - No identified additional risk factors other than previously addressed    Medication Instructions:  Patient is to take all scheduled medications on the day of surgery EXCEPT for modifications listed below:    RECOMMENDATION:  APPROVAL GIVEN to proceed with proposed procedure, without further diagnostic evaluation.    Signed Electronically by: Selvin Riley MD    Health Maintenance   Topic Date Due     MEDICARE ANNUAL WELLNESS VISIT  12/18/2021     FALL RISK ASSESSMENT  12/18/2021     ADVANCE CARE PLANNING  12/18/2025     TD 18+ HE  11/24/2027     Pneumococcal Vaccine: 65+ Years  Completed     INFLUENZA VACCINE RULE BASED  Completed     ZOSTER VACCINES  Completed     Pneumococcal Vaccine: Pediatrics (0 to 5 Years) and At-Risk Patients (6 to 64 Years)  Aged Out     HEPATITIS B VACCINES  Aged Out      Assessment Results 12/18/2020   Activities of Daily Living No help needed   Instrumental Activities of Daily Living No help needed   Get Up and Go Score -   Mini Cog Total Score 5   Some recent data might be hidden     A Mini-Cog score of 0-2 suggests the possibility of dementia, score of 3-5 suggests no dementia    Identified Health Risks:     The patient was counseled and encouraged to consider modifying their diet and eating habits. He was provided with information on recommended healthy diet options.  The patient reports that he does not have all recommended  working emergency equipment available. He was provided with information about emergency preparedness, including smoke detectors.  The patient was provided with written information regarding signs of hearing loss.  Information on urinary incontinence and treatment options given to patient.  Patient's advanced directive was discussed and I am comfortable with the patient's wishes.

## 2021-06-14 NOTE — TELEPHONE ENCOUNTER
Refill Approved    Rx renewed per Medication Renewal Policy. Medication was last renewed on 5/14/20, last OV 12/18/20.    Kathy Coleman, [facility] Triage/Med Refill 1/17/2021     Requested Prescriptions   Pending Prescriptions Disp Refills     mirtazapine (REMERON) 30 MG tablet [Pharmacy Med Name: MIRTAZAPINE 30 MG TABLET] 90 tablet 2     Sig: TAKE 1 TABLET BY MOUTH EVERYDAY AT BEDTIME       Tricyclics/Misc Antidepressant/Antianxiety Meds Refill Protocol Passed - 1/15/2021 12:04 PM        Passed - PCP or prescribing provider visit in last year     Last office visit with prescriber/PCP: 3/22/2019 Selvin Riley MD OR same dept: 11/30/2020 Gabi Garsia CNP OR same specialty: 11/30/2020 Gabi Garsia CNP  Last physical: 12/18/2020 Last MTM visit: Visit date not found   Next visit within 3 mo: Visit date not found  Next physical within 3 mo: Visit date not found  Prescriber OR PCP: Selvin Riley MD  Last diagnosis associated with med order: 1. Bipolar I disorder, single manic episode (H)  - mirtazapine (REMERON) 30 MG tablet [Pharmacy Med Name: MIRTAZAPINE 30 MG TABLET]; TAKE 1 TABLET BY MOUTH EVERYDAY AT BEDTIME  Dispense: 90 tablet; Refill: 2    If protocol passes may refill for 12 months if within 3 months of last provider visit (or a total of 15 months).

## 2021-06-14 NOTE — OP NOTE
Name:  Patrick Wharton  PCP:  Selvin Riley MD  Procedure Date:  12/23/2020      HERNIORRHAPHY, INGUINAL, OPEN (Left)    Pre-Procedure Diagnosis:  Non-recurrent unilateral inguinal hernia without obstruction or gangrene [K40.90]     Post-Procedure Diagnosis:    Non-recurrent unilateral inguinal hernia without obstruction or gangrene [K40.90]    Surgeon(s):  Kevin Banks MD    Anesthesia Type:  MAC    Findings:  Left indirect inguinal hernia    Operative Report:    The patient was brought to the operating room where after initiation of monitored anesthetic care he was positioned with both arms out.  He was then prepped and draped in sterile fashion.  After a procedural timeout we began by injecting quarter percent Marcaine Marcaine into the skin over the left groin.  This was then sharply incised and dissection was carried down to the external oblique fascia.  Local anesthetic below the fascial plane.  This was then sharply incised we then injected additional and extended down to the external inguinal ring.  The inferior shelving edge was bluntly developed.  The spermatic cord and hernia sac were encircled at the level of the pubic tubercle and retracted out of the way with a Penrose drain.  Cremasteric muscle fibers were identified and divided with electrocautery.  The hernia sac was then dissected away from the spermatic cord with a combination of blunt dissection electrocautery.  Once this was dissected back to the internal inguinal ring it was twisted upon itself several times and then suture ligated with a 2-0 Vicryl suture.  The excess hernia sac was then excised.  A self adhering Prolene mesh was then selected for repair.  This was trimmed to appropriate size and laid across the inguinal floor.  The medial aspect the mesh was affixed to the pubic tubercle with a 2-0 Prolene suture in the inferior shelving edge in running fashion with 2-0 Prolene suture.  The medial and superior portions of the mesh were  then affixed to the rectus musculature and conjoined tendon respectively with interrupted 2-0 Vicryl sutures.  A slit in the mesh was reapproximated to itself with interrupted 2-0 Prolene sutures.  After ensuring adequate hemostasis the external oblique fascia was reapproximated over the spermatic cord with a running 2-0 Vicryl suture.  The subcutaneous fatty tissues were reapproximated with a running 3-0 Vicryl suture and the skin was closed a running 4-0 Vicryl suture.    Estimated Blood Loss:   15 mL from 12/23/2020  9:12 AM to 12/23/2020 10:01 AM    Specimens:    * No specimens in log *       Drains:        Complications:    None    Kevin Banks

## 2021-06-14 NOTE — PROGRESS NOTES
" Patient ID: Patrick Wharton is a 82 y.o. male.  /76  Pulse 73  Ht 5' 8.5\" (1.74 m)  Wt 144 lb 6.4 oz (65.5 kg)  SpO2 98%  BMI 21.64 kg/m2    Assessment/Plan:                Diagnoses and all orders for this visit:    Routine general medical examination at a health care facility  -     Tdap vaccine,  8yo or older,  IM    Impaired fasting glucose  -     Basic Metabolic Panel  -     Glycosylated Hemoglobin A1c    Prostate Cancer  -     PSA, Diagnostic (Prostatic-Specific Antigen)    Bipolar Disorder  -     Basic Metabolic Panel    MCI (mild cognitive impairment)      DISCUSSION  Obtain labs.  See specialty providers including memory specialist and dermatologist.  Tetanus shot is administered today otherwise up-to-date on immunizations.  Subjective:     HPI    Patrick Wharton is an 82-year-old man here today for a Medicare wellness visit.  His medical history significant for impaired fasting glucose.  Last A1c was 6.1.  Try to avoid sugar.  Remains active weight is at an ideal level.  Does not report any symptoms to suggest ongoing hyperglycemia.  Discussed obtaining an A1c today.    He is a history of mental health concerns and had previously been diagnosed with bipolar disorder.  Currently on mirtazapine.  Occasionally having some difficulty with sleep but overall feels he is doing reasonably well.  Denies any mood concerns.    His diagnosis of mild cognitive impairment.  He has had long-standing memory issues.  He was sent to Dr. Mcginnis at the [facility] last year where he underwent a more detailed evaluation.  He was noted to have findings that were consistent with mild cognitive impairment.  He has follow-up scheduled but is not reporting any additional difficulties.  He is here by himself so there is no one to substantiate his considerations.  I do notice during the course of her interview he is not seeming to have any significant difficulties recalling events in great detail from both the recent and more " distant past.    He is a history of prostate cancer which has been treated.  He has had 2 PSA tests done that have been 0.1.  We discussed evaluating this closely and considering sending him to urology for consideration of treatment if his PSA continues to rise.  His previous PSAs were undetectable after his cancer treatment.    Her health preventive information is reviewed.  Immunization information is reviewed it is noted he is due for a tetanus shot.    Review of Systems  Complete review of systems is obtained.  Other than the specific considerations noted above complete review of systems is negative.      Objective:   Medications:  Current Outpatient Prescriptions   Medication Sig     mirtazapine (REMERON) 30 MG tablet Take 1 tablet (30 mg total) by mouth at bedtime.     multivitamin (MEN'S MULTI-VITAMIN) per tablet Take 1 tablet by mouth daily.     tadalafil (CIALIS) 10 MG tablet Take 1 tablet (10 mg total) by mouth daily as needed for erectile dysfunction.     Allergies:  No Known Allergies    Tobacco:   reports that he has never smoked. He does not have any smokeless tobacco history on file.    HEALTH PREVENTION    General  Dental care: Discussed the importance of regular dental care.  Eye care: Discussed importance of routine eye exams for glaucoma screening  Exercise: Discussed importance of continuing regular physical activity  Diet: Discussed a healthy balanced diet that is low in sugar    Wt Readings from Last 3 Encounters:   11/24/17 144 lb 6.4 oz (65.5 kg)   01/13/17 139 lb (63 kg)   12/14/16 139 lb (63 kg)     Body mass index is 21.64 kg/(m^2).    Cancer screening  Testicular cancer:is discussed and exam performed today  Skin cancer: Discussed sun burn prevention and self monitoring.  Colon cancer: Colon cancer screening is discussed.  No indication for further screening.  Prostate cancer: See discussion above, undergoing surveillance after treatment of prostate cancer    Cholesterol:   LDL Calculated  "(mg/dL)   Date Value   11/19/2015 66   11/18/2014 75   11/09/2012 76      Blood Pressure:   BP Readings from Last 3 Encounters:   11/24/17 126/76   01/13/17 135/67   12/14/16 125/74     Immunization History   Administered Date(s) Administered     DT (pediatric) 05/18/1995, 08/18/2005     Influenza [id number], 12/23/2009     Influenza high dose, seasonal 10/06/2015, 09/26/2016, 09/19/2017     Influenza, Seasonal, Inj PF IIV3 10/07/2010     Influenza, inj, historic,unspecified 12/04/2007, 09/26/2016     Influenza, seasonal,quad inj 6-35 mos 12/15/2008, 10/05/2009, 10/07/2010, 11/01/2011, 11/09/2012, 10/14/2013     Influenza,seasonal quad, PF 11/17/2014     Influenza,seasonal quad, PF, 36+MOS 11/17/2014     Influenza,seasonal, Inj IIV3 11/16/2005, 12/04/2007, 12/15/2008, 10/05/2009, 11/01/2011, 11/09/2012, 10/14/2013     Pneumo Conj 13-V (2010&after) 11/22/2016     Pneumo Polysac 23-V 01/01/2001, 11/17/2014     Td, Adult, Absorbed 08/18/2005     Td,adult,historic,unspecified 08/18/2005     Tdap 11/24/2017     There are no preventive care reminders to display for this patient.     Physical Exam      /76  Pulse 73  Ht 5' 8.5\" (1.74 m)  Wt 144 lb 6.4 oz (65.5 kg)  SpO2 98%  BMI 21.64 kg/m2    General Appearance:    Alert, cooperative, no distress, appears stated age   Head:    Normocephalic, without obvious abnormality, atraumatic   Eyes:   No scleral icterus or conjunctival irritation       Ears:    Normal TM's and external ear canals, both ears   Nose:   Nares normal, septum midline, mucosa normal, no drainage    or sinus tenderness   Throat:   Lips, mucosa, and tongue normal; teeth and gums normal   Neck:   Supple, symmetrical, trachea midline, no adenopathy;        thyroid:  No enlargement/tenderness/nodules   Lungs:     Clear to auscultation bilaterally, respirations unlabored       Heart:    Regular rate and rhythm, S1 and S2 normal, no murmur, rub   or gallop   Abdomen:     Soft, non-tender, bowel sounds " active all four quadrants,     no masses, no organomegaly   Extremities:   Extremities normal, atraumatic, no cyanosis or edema   Skin:  Multiple seborrheic keratoses on the back.  There is suspicious looking skin lesion that may be in a keratoacanthoma on the anterior abdomen   Neurologic:   CNII-XII intact. Normal strength, sensation and reflexes       throughout

## 2021-06-14 NOTE — INTERVAL H&P NOTE
I have performed an assessment and examined the patient, as necessary, to update the patient's current status that may have changed since the prior History and Physical.  The History & Physical has been reviewed and no updates are needed.       Kevin Banks MD, FACS  Office: [phone]  [facility]

## 2021-06-14 NOTE — PROGRESS NOTES
"Patrick Wharton is a 85 y.o. male who is being evaluated via a billable telephone visit.      The patient has been notified of following:     \"This telephone visit will be conducted via a call between you and your physician/provider. We have found that certain health care needs can be provided without the need for a physical exam.  This service lets us provide the care you need with a short phone conversation.  If a prescription is necessary we can send it directly to your pharmacy.  If lab work is needed we can place an order for that and you can then stop by our lab to have the test done at a later time.    Telephone visits are billed at different rates depending on your insurance coverage. During this emergency period, for some insurers they may be billed the same as an in-person visit.  Please reach out to your insurance provider with any questions.    If during the course of the call the physician/provider feels a telephone visit is not appropriate, you will not be charged for this service.\"    Patient has given verbal consent to a Telephone visit? Yes      HPI: Pt is s/p    HERNIORRHAPHY, INGUINAL, OPEN (Left) with Dr. Banks on 12/23/20.   he is doing well.  Pain is well controlled:  Yes. No difficulties with the surgical wound/wounds, no reports of erythema or drainage.  he is eating well and denies fever and chills. Bowel function as returned to normal.      Assessment/Plan: Doing well after surgery and should follow up as needed.    Jose R Brewer PA-C  [phone]  General Surgery      Phone call duration: 7 minutes    Jose R Brewer PA-C    "

## 2021-06-15 NOTE — PROGRESS NOTES
Persons accompanying you (the patient) today: self    How have you been doing since we saw you last? Please note any concerns.  Good little more forgetful    Please list any recent hospitalizations/surgeries/procedures you've had since we saw you last:  no    Have you had any falls since your last visit? No    Do you have any pain today? No

## 2021-06-15 NOTE — PROGRESS NOTES
NEUROPSYCHOLOGICAL CONSULTATION    NAME:  Patrick Wharton  :  1935    HANEY: 2018    REASON FOR REFERRAL:  Mr. Patrick Wharton is a 82 y.o., right-handed,  male who was referred for a neurocognitive re-evaluation by Vikas Mcginnis MD to assist with differential diagnosis and care planning. I previously evaluated Mr. Wharton on 16 and the results of that evaluation indicated mild to moderately impaired visual delayed memory and subtle relative weaknesses (albeit low average performances) in the domains of phonemic verbal fluency and mental flexibility and set-shifting.  I felt that those performances represented a mild decline from his high average baseline abilities.  While the only frankly impaired performance was observed in the domain of visual memory, Mr. 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 I encouraged Mr. Wharton in 1 year for a repeat neurocognitive evaluation, and in the meantime, to continue to maintain his engagement in regular physical exercise, mindful eating, and his many cognitively and socially stimulating hobbies. He returns today for re-evaluation to document any cognitive declines over the course of the last year.    CLINICAL INTERVIEW:  At the present time, Mr. Wharton reported that he doubted that he would do as well on these tests as he did in 2016.  When I attempted to query his rationale, he indicated that he tends to make more oversights now than in the past. However, he does not feel that his memory is any worse.  He provided the example of wanting to go to one of his properties and work on the electrical box, but failing to remember to take the flashlight with him.  Little things like this leave him feeling more inefficient.  He continues to use a calendar to remember appointments.  The biggest change in his life is that he and his wife have  "recently moved to an independent senior living facility.  In the last year has been very busy with and preparing to put their house on the market, packing, and moving.  He described them as \"living among boxes,\" as they just recently settled into their apartment (end of December 2017).  He described their arrangement as slightly more chaotic, and noted that his wife will often move some of the items that he is already unpacked, which has been somewhat confusing.  Mr. Wharton continues to be independent in the management of his finances and medications.  He continues to prepare meals without difficulties and drives without restriction. He does often take his GPS with him and denied any recent history of getting lost or turned around.    With regard to mood, Mr. Wharton stated that his spirits are fine but that he continues to stay on the mirtazapine for assistance with sleep.  He is currently sleeping from 10 PM until 7 AM; attempts to wean off/down on that medication have proven unsuccessful.  He denied any recent depression, anxiety or episodes of elisabet.  He continues to exercise on his stationary bicycle 7 days a week but has been less prone to going on walks with his wife.  He stated that he tries to avoid sweets and watch his blood sugars.  His appetite is adequate.  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 He will be organizing his high school reunion (65 year reunion) this summer and continues to enjoy regular social engagements which will be even more available in his new living arrangement, which he jokingly described as \"a 3M ghetto.\"    MEDICAL, PSYCHIATRIC, AND SOCIAL HISTORY: There is referred to my previous note dated 12/23/16 for detailed review of Mr. Wharton's medical, family medical, psychiatric and social histories.  Information provided below reflects only recent changes in his status in the time since his previous evaluation.    There have been no recent changes " in his medical history, no recent hospitalizations or surgeries.  He denied changes in his walking, gait or stability. There have been no falls or recent head injuries.    His A1c is 6.1. Mr. Wharton's did note that he has been sleeping on an air mattress and since moving into their new apartment at the end of December; his back has recently been hurting.   He has a remote history of lumbar fusion surgery (2007) and is consulting with his physician about treatment options for his recent back difficulties.      Current medications include (per medical record):   Current Outpatient Prescriptions:      mirtazapine (REMERON) 30 MG tablet, Take 1 tablet (30 mg total) by mouth at bedtime., Disp: 90 tablet, Rfl: 2     multivitamin (MEN'S MULTI-VITAMIN) per tablet, Take 1 tablet by mouth daily., Disp: , Rfl:      tadalafil (CIALIS) 10 MG tablet, Take 1 tablet (10 mg total) by mouth daily as needed for erectile dysfunction., Disp: 5 tablet, Rfl: 2.    SERVICES:   Pertinent information was obtained by reviewing the electronic medical record as well as through an individual interview I conducted with the patient.  I selected, integrated and interpreted the tests and generated this report.  A trained examiner administered and scored the neuropsychometric tests. For diagnostic and coding purposes, Mr. Wahrton has a history of mild neurocognitive disorder, bipolar I disorder, and prostate cancer and was referred for an evaluation of mild neurocognitive disorder. Today s evaluation consisted of 1 unit of 76734,  3 units of 70782, and 3 units of 54146.     TESTS ADMINISTERED:   Wechsler Adult Intelligence Scale-IV (select subtests), Wide Range Achievement Test-4 (select subtests), Wechsler Memory Test-IV (select subtests),  Brief Visuospatial Memory Test-Revised, [place] Verbal Learning Test-II, Trailmaking Test, Controlled Oral Word Association Test and Category Fluency, Pierce Naming Test-2,Guru-Osterrieth Complex Figure Test,  Stroop Color and Word Test, [place] Card Sorting Test-1 deck, [place], Hansen Judgement of Line Orientation, Geriatric Depression Scale-Short Form.    DESCRIPTIVE PERFORMANCE KEY:  Scores at the 9th percentile and above are generally considered within normal limits:  Superior scores:  91st percentile and above  High Average scores:       75th through 90th percentile  Average scores:                 25th through 74th percentile  Low Average scores:         10th through 24th percentile    Scores that fall at the 9th percentile are considered borderline    Scores that fall at the 8th percentile and below are considered a degree of impairment:  Mildly Impaired:  3rd through 8th percentile  Moderately Impaired:  1st through 2nd percentile  Severely Impaired:  <1st percentile  BEHAVIORAL OBSERVATIONS:   Mr. Wharton arrived on time and unaccompanied  to today's appointment. He was appropriately dressed and groomed.  He appeared alert and engaged.  His mood was euthymic and his affect was appropriately reactive.   Rapport was easily established and eye contact was unremarkable.  He was pleasant and cooperative. Rate, prosody, and content of speech were grossly normal. There was no evidence of a ankur thought disorder; no hallucinations or delusions were apparent.  Judgment and insight appeared intact and he was a good historian.    Mr. Wharton appeared adequately motivated and engaged easily in the testing component of the evaluation.  His performance was fully intact on measures of objective effort.  He was alert and engaged.  He attempted all tasks presented to him and worked at a steady pace.  He did not appear overly frustrated by difficult or challenging tasks and responded appropriately to encouragement from the examiner.  No significant barriers to testing were observed and the following is judged to be a valid representation of his current neurocognitive strengths and weaknesses.    OPTIMAL PREMORBID  INTELLECT:  Optimal premorbid intellectual abilities were estimated as falling in the upper to high average range based on Mr. Wharton's educational and occupational histories and performance on tasks least likely to be affected by acquired brain dysfunction (i.e.,  hold tests ).    SUMMARY OF TEST RESULTS:    Sensory testing and motor testing were not performed, in favor of cognitive tests.      Comprehension was fully intact.  His performance was in the superior range on a measure of verbal abstraction.  He performed in the high average range and a measure of confrontation naming and in the average range across measures of phonemic and semantic verbal fluency.  When compared to the results of his previous evaluation, his performances within the verbal domain are largely stable.    Auditory attention and working memory performances were average.  Specifically, Mr. Wharton was able to immediately recall up to 5 digits presented auditorily, mentally reverse up to 4 digits, and numerically sequence up to 5 digits.  He was able to recall only 3 words after an initial trial of a supraspan word learning task (low average).Compared to the results of his previous evaluation, his performances are slightly weaker in the domain of basic attention.    Cognitive speed and processing accuracy performances were average on a speeded measure of visual scanning and numeric sequencing.  His performance was high average on a measure of speeded mental flexibility and set-shifting.  Cognitive speed was consistently average across measures of speeded word reading and color naming and was high average on a measure of speeded response inhibition.  When compared to the results of his previous evaluation, cognitive speed appears stable.    With regard to visuoperceptual skills, he exhibited average performance on a measure three-dimensional visual construction that required him to re-create a series of 2-dimensional designs via block arrays.   His performance was high average on a measure of mental rotation and visual analysis and in the superior range on a measure of visual reasoning and pattern identification.  He was asked to copy a complex visual stimulus and performed in the superior range.  Judgment of the angles at which lines were presented was superior as well.  Visuoperceptual and constructional skills are stable in the time since his previous evaluation.    Mr. Wharton is also asked to learn a series of verbal details presented auditorily in a short story format.  He exhibited the low average rate of initial learning but retained and recalled 100% of the previously learned details over delay (average).  His performance was likewise in the average range and somewhat facilitated by the presentation of a recognition test format.  He was also read a 16 item word list and asked to learn the words over 5 trials.  He exhibited an average to high average rate of learning, although notable variability was observed (raw score recall = 3, 5, 7, 10, 11).  He retained and recalled 4 of the words from a distractor word list (average) and 4 of the 11 previously learned words over a short delay (low average).  His performance improved to average menses recall of a) when he was provided with semantic cues.  He recalled 7 of the 11 previously learned words over a long delay and 8 with cueing (average).  Recognition memory was notable for 10 hits and 2 false positive errors (below expectation).  She is not was also asked to learn a series of 6 simple geometric designs over 3, 10 second learning trials and exhibited a mildly  impaired rate of new learning (raw score recall - 2, 3, 5) and retained all 5 of the previously learned words over a delay (low average). Recognition memory was again below expectation (2 hits, 3-5%ile, 0 false positives).  When compared to the results of his previous evaluation, variability is again noted but there does not appear to be a  decline in his memory performances in the time since hi previous evaluation.    Nonverbal problem solving abilities were in the low average range on a measure that required him to generate and flexibly alternate between sorting strategies based on the feedback that he received from the examiner.  He caught on to the task more quickly this time, but again achieved only one solution to the measure. He was also considerably more perseverative in today's exam (mildly impaired).  Mr. Wharton was also administered a measure of nonverbal problem solving that required him to use sequential reasoning, attention, problem solving, and cognitive speed to recreate a series of configurations on a pegboard.  He took slightly longer than average to plan his approach to the task, but this strategy appeared to pay off as he was incredibly efficient in his approach to the measure and achieved an optimal solution on 8 of the 10 items (superior) and executed his moves within an average to high average time frame.      Mr. Wharton denied depressive symptoms on a self-report measure, endorsing only one item on the GDS-15 indicating that he feels that he has more problems with his memory than most other people his age.    DIAGNOSTIC IMPRESSIONS & RECOMMENDATIONS:  Mr. Wharton is a 82 y.o., right-handed,  male with a history of prostate cancer and a provisional diagnosis of mild neurocognitive disorder (2016) who was referred for a neurocognitivere- evaluation by Vikas Mcginnis MD  to assist with differential diagnosis and care planning. Optimal premorbid intellectual abilities were estimated as falling in the upper average to high average range and Mr. Carreras performance was intact and commensurate with that estimate across measures of verbal and visual reasoning, cognitive speed and processing accuracy, visuoconstruction, and expressive language. Auditory attention and working memory performances were somewhat more variable, and appeared  to contribute to a pattern of subtly more inefficient new learning abilities; however, delayed memory performances remained broadly within the average range.  Mr. Wharton was slightly more prone to perseveration on a measure of nonverbal reasoning and problem solving, but exhibited strong performances on a measure of visual reasoning and sequential reasoning.      When compared to the results of his previous evaluation, Mr. Wharton's profile is slightly more variable, but significant for only subtle attentional fluctuations and inefficiencies in new learning. His memory performances appear stable and there is not consistent evidence of decline over the course of the last year.  The provisional diagnosis of Mild Neurocognitive Disorder that he received in 2016 remains appropriate, however, the stability of his profile calls into question whether there is an underlying progressive process (i.e., dementia).  We will often see a stabilization of the cognitive profile between the first and second neuropsychological evaluations (partially attributable to practice effects), with greater changes seen by the time of the third evaluation.  Given that Mr. Wharton and his wife remain so independent, I would recommend a follow-up neuropsychological evaluation in another 12-18 months. If at that point his profile is stable, I would not recommend further testing or diagnostic work-up.  In the meantime, I see no need for him to give up his engagement in his normal activities (i.e., management of his finances, personal affairs, medications, driving) and encourage him to continue to focus on his cardiovascular health and regular exercise program.  Cognitively stimulating activities are in no short supply for Mr. Wharton and I suspect that he and is wife will benefit from the increased opportunities for socialization at their new independent living facility.    Mr. Wharton has requested to receive the results of this evaluation from his  referring provider at the time of an upcoming follow-up appointment; however, he was encouraged to schedule a formal feedback appointment with me after that appointment to discuss these results in further detail.     Thank you for allowing me to participate in Mr. Wharton's care.  Please contact me with any questions regarding the content of this report.      Kirti Mcghee, PhD, LP, ABPP  Clinical Neuropsychologist, [id number]  Board Certified in Clinical Neuropsychology    [facility]  Neuropsychology Section   Phone:  [phone]

## 2021-06-15 NOTE — PROGRESS NOTES
Assessment / Impression   1.  Amnestic mild cognitive impairment, stable      Plan:   1.  Wellness programming  2.  Follow-up in 1 year for conversation regarding assessment moving forward    Long conversation with the patient with total time for evaluation 30 minutes with majority time spent in counseling.  This patient with what appears to be stable mild cognitive impairment has noted no significant changes in cognitive performance over the past year.  I explained to the patient that data generated thus far is quite encouraging with respect to the prognosis moving forward.  With this in mind we will meet in 1 year at which time we will have a conversation as to whether further assessment is deemed warranted.  It may be that a follow-up neuropsychometric testing in 2-3 years would be appropriate depending on the patient's general health status etc.      Subjective:     HPI: Patrick Wharton is a 82 y.o. male with above-noted diagnoses who returns for follow-up.  The patient did undergo repeat neuropsychometric testing and attempt to stratify his known mild cognitive impairment.  Test results were quite encouraging with the patient demonstrating essentially stability with respect to mild cognitive difficulties.  The patient offers no new complaints at this time.          Review of Systems:          As above        Objective:     Vitals:    01/22/18 0948 01/22/18 0949   BP: 121/68 118/71   Pulse: 77 80   Weight: 143 lb (64.9 kg)        No results found for this or any previous visit (from the past 24 hour(s)).    Physical Exam: Casually dressed and seated, the patient was alert and socially appropriate during the course of evaluation.  No evidence of significant cognitive difficulties noted during conversation and review of test results.  No somatic difficulties identified.

## 2021-06-17 NOTE — PATIENT INSTRUCTIONS - HE
Patient Instructions by Selvin Riley MD at 12/16/2019 12:00 PM     Author: Selvin Riley MD Service: -- Author Type: Physician    Filed: 12/16/2019  1:30 PM Encounter Date: 12/16/2019 Status: Addendum    : Selvin Riley MD (Physician)    Related Notes: Original Note by Selvin Riley MD (Physician) filed at 12/16/2019 12:57 PM       Obtain Miralax (poly ethylene glycol) from your pharmacy.  Take one dose daily to help with constipation in addition to continuing your Metamucil Fiber.    Start by taking 1 tablespoon mixed with 8 oz. Any liquid and adjust the dose up or down by 1/2 tablespoon as needed to achieve the desired effect.  Reduce dose if loose stool develops.      Patient Education   Understanding [employer] MyPlate  The USDA ([place] Department of Agriculture) has guidelines to help you make healthy food choices. These are called MyPlate. MyPlate shows the food groups that make up healthy meals using the image of a place setting. Before you eat, think about the healthiest choices for what to put onto your plate or into your cup or bowl. To learn more about building a healthy plate, visit www.choosemyplate.gov.       The Food Groups    Fruits: Any fruit or 100% fruit juice counts as part of the Fruit Group. Fruits may be fresh, canned, frozen, or dried, and may be whole, cut-up, or pureed. Make half your plate fruits and vegetables.    Vegetables: Any vegetable or 100% vegetable juice counts as a member of the Vegetable Group. Vegetables may be fresh, frozen, canned, or dried. They can be served raw or cooked and may be whole, cut-up, or mashed. Make half your plate fruits and vegetables.     Grains: All foods made from grains are part of the Grains Group. These include wheat, rice, oats, cornmeal, and barley such as bread, pasta, oatmeal, cereal, tortillas, and grits. Grains should be no more than a quarter of your plate. At least half of your grains should be whole grains.    Protein: This  group includes meat, poultry, seafood, beans and peas, eggs, processed soy products (like tofu), nuts (including nut butters), and seeds. Make protein choices no more than a quarter of your plate. Meat and poultry choices should be lean or low fat.    Dairy: All fluid milk products and foods made from milk that contain calcium, like yogurt and cheese are part of the Dairy Group. (Foods that have little calcium, such as cream, butter, and cream cheese, are not part of the group.) Most dairy choices should be low-fat or fat-free.    Oils: These are fats that are liquid at room temperature. They include canola, corn, olive, soybean, and sunflower oil. Foods that are mainly oil include mayonnaise, certain salad dressings, and soft margarines. You should have only 5 to 7 teaspoons of oils a day. You probably already get this much from the food you eat.  Use [employer] to Help Build Your Meals  The SuperTracker can help you plan and track your meals and activity. You can look up individual foods to see or compare their nutritional value. You can get guidelines for what and how much you should eat. You can compare your food choices. And you can assess personal physical activities and see ways you can improve. Go to www.[employer].gov/supertracker/.    3958-3142 The "[employer]. [address] [place] [id number]. All rights reserved. This information is not intended as a substitute for professional medical care. Always follow your healthcare professional's instructions.           Patient Education   Signs of Hearing Loss  Hearing loss is a problem shared by many people. In fact, it is one of the most common health conditions, particularly as people age. Most people over age 65 have some hearing loss, and by age 80, almost everyone does. Because hearing loss usually occurs slowly over the years, you may not realize your hearing ability has gotten worse.       Have your hearing checked  Contact your  [facility] care provider if you:    Have to strain to hear normal conversation.    Have to watch other peoples faces very carefully to follow what theyre saying.    Need to ask people to repeat what theyve said.    Often misunderstand what people are saying.    Turn the volume of the television or radio up so high that others complain.    Feel that people are mumbling when theyre talking to you.    Find that the effort to hear leaves you feeling tired and irritated.    Notice, when using the phone, that you hear better with 1 ear than the other.    3162-4524 [employer]. [address] [place] [address]. All rights reserved. This information is not intended as a substitute for professional medical care. Always follow your healthcare professional's instructions.         Patient Education   Urinary Incontinence (Male)    Urinary incontinence means not being able to control the release of urine from the bladder.  Causes  Common causes of urinary incontinence in men include:    Infection    Certain medicines    Aging    Poor pelvic muscle tone    Bladder spasms    Obesity    Urinary retention  Nervous system diseases, diabetes, sleep apnea, urinary tract infections, prostate surgery, and pelvic trauma can also cause incontinence. Constipation and smoking have also been identified as risk factors.  Symptoms    Urge incontinence (also called overactive bladder) is a sudden urge to urinate even though there may not be much urine in the bladder. The need to urinate often during the night is common. It is due to bladder spasms.    Stress incontinence is involuntary urine leakage that can occur with sneezing, coughing, and other actions that put stress on the bladder.  Treatment  Treatment of urinary incontinence depends on the cause. Infections of the bladder are treated with antibiotics. Urinary retention is treated with a bladder catheter.  Home care  Follow these guidelines when caring for yourself at  home:    Don't consume foods and drinks that may irritate the bladder. These include drinks containing alcohol, caffeinate, or carbonation; chocolate; and acidic fruits and juices.    Limit fluid intake to 6 to 8 cups a day.    Lose weight if you are overweight. This will reduce your symptoms.    If needed, wear absorbent pads to catch urine. Change pads frequently to maintain hygiene and prevent skin and bladder infections.    Bathe daily to maintain good hygiene.    If an antibiotic was prescribed to treat a bladder infection, be sure to take it until finished, even if you are feeling better before then. This is to make sure your infection has cleared.    If a catheter was left in place, it is important to keep bacteria from getting into the collection bag. Don't disconnect the catheter from the collection bag.    Use a leg band to secure the catheter drainage tube, so it does not pull on the catheter. Drain the collection bag when it becomes full using the drain spout at the bottom of the bag. Don't disconnect the bag from the catheter.    Don't pull on or try to remove a catheter. The catheter must be removed by a healthcare provider.  Follow-up care  Follow up with your healthcare provider, or as advised.  When to seek medical advice  Call your healthcare provider right away if any of these occur:    Fever over 100.4 F (38 C), or as directed by your healthcare provider    Bladder pain or fullness    Abdominal swelling, nausea, or vomiting    Back pain    Weakness, dizziness, or fainting    If a catheter was left in place, return if:  ? Catheter falls out  ? Catheter stops draining for 6 hours  Date Last Reviewed: 10/1/2017    4136-6612 [employer]. [address] [place] [address], [place] [address]. All rights reserved. This information is not intended as a substitute for professional medical care. Always follow your healthcare professional's instructions.       Advance Directive  Patients advance  directive was discussed and I am comfortable with the patients wishes.  Patient Education   Personalized Prevention Plan  You are due for the preventive services outlined below.  Your care team is available to assist you in scheduling these services.  If you have already completed any of these items, please share that information with your care team to update in your medical record.  Health Maintenance   Topic Date Due   ? FALL RISK ASSESSMENT  12/05/2019   ? MEDICARE ANNUAL WELLNESS VISIT  12/05/2019   ? ZOSTER VACCINES (1 of 2) 12/26/2019   ? ADVANCE CARE PLANNING  12/05/2023   ? TD 18+ HE  11/24/2027   ? PNEUMOCOCCAL IMMUNIZATION 65+ LOW/MEDIUM RISK  Completed   ? INFLUENZA VACCINE RULE BASED  Completed

## 2021-06-18 NOTE — PATIENT INSTRUCTIONS - HE
Patient Instructions by Selvin Riley MD at 12/18/2020  9:40 AM     Author: Selvin Riley MD Service: -- Author Type: Physician    Filed: 12/18/2020 10:03 AM Encounter Date: 12/18/2020 Status: Signed    : Selvin Riley MD (Physician)         Patient Education   Understanding USDA MyPlate  The USDA (US Department of Agriculture) has guidelines to help you make healthy food choices. These are called MyPlate. MyPlate shows the food groups that make up healthy meals using the image of a place setting. Before you eat, think about the healthiest choices for what to put onto your plate or into your cup or bowl. To learn more about building a healthy plate, visit www.choosemyplate.gov.       The Food Groups    Fruits: Any fruit or 100% fruit juice counts as part of the Fruit Group. Fruits may be fresh, canned, frozen, or dried, and may be whole, cut-up, or pureed. Make half your plate fruits and vegetables.    Vegetables: Any vegetable or 100% vegetable juice counts as a member of the Vegetable Group. Vegetables may be fresh, frozen, canned, or dried. They can be served raw or cooked and may be whole, cut-up, or mashed. Make half your plate fruits and vegetables.     Grains: All foods made from grains are part of the Grains Group. These include wheat, rice, oats, cornmeal, and barley such as bread, pasta, oatmeal, cereal, tortillas, and grits. Grains should be no more than a quarter of your plate. At least half of your grains should be whole grains.    Protein: This group includes meat, poultry, seafood, beans and peas, eggs, processed soy products (like tofu), nuts (including nut butters), and seeds. Make protein choices no more than a quarter of your plate. Meat and poultry choices should be lean or low fat.    Dairy: All fluid milk products and foods made from milk that contain calcium, like yogurt and cheese are part of the Dairy Group. (Foods that have little calcium, such as cream, butter, and  cream cheese, are not part of the group.) Most dairy choices should be low-fat or fat-free.    Oils: These are fats that are liquid at room temperature. They include canola, corn, olive, soybean, and sunflower oil. Foods that are mainly oil include mayonnaise, certain salad dressings, and soft margarines. You should have only 5 to 7 teaspoons of oils a day. You probably already get this much from the food you eat.  Use YouStic[employer]rer to Help Build Your Meals  The SuperTracker can help you plan and track your meals and activity. You can look up individual foods to see or compare their nutritional value. You can get guidelines for what and how much you should eat. You can compare your food choices. And you can assess personal physical activities and see ways you can improve. Go to www.[employer].gov/CreateTripscker/.    5375-4875 The [employer]. [address] [place] [address]. All rights reserved. This information is not intended as a substitute for professional medical care. Always follow your healthcare professional's instructions.           Patient Education    Home Fire Safety  Each year, thousands of people, including children, are injured and killed in home fires. Children are often curious about fire, and may not understand the dangers. This makes home fire safety practices especially important. Three important things you can do to keep your home safe from fire are:    Install smoke alarms in your home and make sure they work properly.    Teach children not to play with matches, lighters, and other materials that can be used to start fires. And keep these materials out of childrens reach.    Teach children what to do in case of fire. Create a fire safety action plan and practice it.  Read on for more details about keeping your family and home safe from fire.        Being Prepared for a Fire  A home fire can happen at any time. The following can help you be prepared:    Install smoke alarms  on every level of your home, including the basement and outside all sleeping areas. This simple step cuts your familys risk of dying in a fire nearly in half.    Test smoke alarms monthly, and change the batteries once a year or when the alarm chirps.    Dont disable smoke alarms, even for a short time.    Ask your local fire department for tips on where to place smoke alarms in your home.    Replace all smoke alarms every 10 years.    Consider using voice smoke alarms. These alarms allow you to substitute your own voice for the alarm sound. They are helpful because many children dont wake up to the sound of a regular smoke alarm.    Install carbon monoxide detectors near sleeping areas.    Be aware that carbon monoxide is a byproduct of smoke that can be deadly. Its a gas that you cant see, smell, or taste.    Consider buying a combination smoke alarm / carbon monoxide detector.    Keep fire extinguishers in the home.    Keep them in accessible locations, especially in the kitchen.    Check usage dates to make sure they are not .    Use fire extinguishers only when the fire is in a contained area and is not spreading. (Otherwise, you should focus on getting out of the home.)    Train adults to use fire extinguishers. (Children should focus on getting out of the home during a fire.)    If you live in an apartment, talk to your landlord about where smoke alarms are and how often they are tested. Also ask about fire extinguisher locations and emergency exit routes.  Indoor Fire Safety  Many things in your home are potential fire hazards. Follow these steps to help keep your home safe.    Be careful in the kitchen.    Never leave food thats cooking unattended.    If a fire breaks out in a cooking pan, put a lid on it to smother it. And never throw water on a grease fire. It will make the fire worse.    Conduct a home safety inspection. Look for anything, such as frayed wires and cords, that can cause a fire. Fix  or remove any fire safety hazards you find.    Keep all matches and lighters in a secured drawer or cabinet out of the reach of children. Use childproof lighters.    Check to make sure all appliances, including the stove, are turned off before leaving the home.    Know where the gas main shut-off is located.    Make sure space heaters are stable and have protective covers. Keep them at least 3 feet from anything that can burn, such as curtains. Dont use space heaters in areas where young kids spend time alone.    Keep flammable liquids such as kerosene and gasoline locked up and safely stored away from kids and heat.    Keep all smoking materials out of reach of children. And never smoke in bed. If possible, smoke outdoors only.  Outdoor Fire Safety  Fire can be a hazard outdoors as well as indoors. When outdoors, be sure to do the following:    Always supervise kids near a barbecue grill, campfire, or portable stove.    Dont use fire pits around children. Kids can fall into them, and pits can be hot even after the fire goes out.    Keep a garden hose or fire extinguisher handy when cooking outdoors in case of fire.  Teaching Your Child About Fire Safety  One of the best ways to keep your home safe from fire is education. Make sure everyone in your family knows fire safety rules, including children.    Teach your children the dangers of matches, lighters, and other dangerous items.    Teach them to never touch these and other objects that are hot, such as candles.    Have them tell you right away whenever they find matches or lighters. Explain that these items are tools for grown-ups, not toys. And never amuse children with matches or lighters.    Round up all matches and lighters and store them safely. In case you missed some, ask your children to tell you where any are located throughout your home.    Never leave a child alone in a room with a lit candle. Dont allow teens to have candles in their rooms.    Show  children what to do in case of fire.    Be sure your kids know what the fire alarm sounds like and what to do if it goes off.    Teach kids what to do if their clothes catch fire: Stop, Drop to the ground, and Roll until the fire is put out. They should also cover their face with their hands. Practice these steps with your children. Make sure they understand that running will make the fire burn faster.    Show children how to crawl below smoke during a fire.    Make sure kids know at least two escape routes from each room in the home. These escape routes can be windows.    Teach kids to test doors for nearby fire by feeling for heat with the back of their hand. If the door is warm or hot, they should try their second exit.    Explain to children that they cant hide from a fire. Hiding in a closet or under a bed wont make them safe. Instead, they should try to escape the home. And if they cant escape, they should let others know they are trapped. They can do this by shutting the door to the room, opening a window, and turning on the lights.    Talk to your local fire department.    Introduce your children to a . Let them know that firefighters will look different when in full protective gear. Tell them to never hide from firefighters, and to follow all directions from firefighters during a fire.    Find out if the fire department has a fire safety program for kids.      Create a Fire Safety Plan  Create a plan for your family to follow in case of a fire. Try making it a family project. Important steps for the plan include leaving the home right away and having a designated meeting place.    Make sure your child understands to get out and stay out. He or she should get out of the home immediately and not go back in, even if family members or pets are still inside.    Decide on a safe meeting place away from the home for everyone to gather.    Teach children to call 911 or emergency services from a cell  phone or neighbors phone. Make sure they know to do this only after they are safely out of the home.    Teach your children the fire safety plan. Practice it and make sure they understand it.    Have fire drills twice a year to keep your children prepared in case of fire.    Visit the National Fire Protection Association web site at www.nfpa.org for more information.      0759-7783 [employer]. [address] [place] [address]. All rights reserved. This information is not intended as a substitute for professional medical care. Always follow your healthcare professional's instructions.         Patient Education   Signs of Hearing Loss  Hearing loss is a problem shared by many people. In fact, it is one of the most common health conditions, particularly as people age. Most people over age 65 have some hearing loss, and by age 80, almost everyone does. Because hearing loss usually occurs slowly over the years, you may not realize your hearing ability has gotten worse.       Have your hearing checked  Contact your [facility] care provider if you:    Have to strain to hear normal conversation.    Have to watch other peoples faces very carefully to follow what theyre saying.    Need to ask people to repeat what theyve said.    Often misunderstand what people are saying.    Turn the volume of the television or radio up so high that others complain.    Feel that people are mumbling when theyre talking to you.    Find that the effort to hear leaves you feeling tired and irritated.    Notice, when using the phone, that you hear better with 1 ear than the other.    6920-0670 [employer]. [address] [place], [place] [id number]. All rights reserved. This information is not intended as a substitute for professional medical care. Always follow your healthcare professional's instructions.         Patient Education   Urinary Incontinence (Male)    Urinary incontinence means not being able to  control the release of urine from the bladder.  Causes  Common causes of urinary incontinence in men include:    Infection    Certain medicines    Aging    Poor pelvic muscle tone    Bladder spasms    Obesity    Urinary retention  Nervous system diseases, diabetes, sleep apnea, urinary tract infections, prostate surgery, and pelvic trauma can also cause incontinence. Constipation and smoking have also been identified as risk factors.  Symptoms    Urge incontinence (also called overactive bladder) is a sudden urge to urinate even though there may not be much urine in the bladder. The need to urinate often during the night is common. It is due to bladder spasms.    Stress incontinence is involuntary urine leakage that can occur with sneezing, coughing, and other actions that put stress on the bladder.  Treatment  Treatment of urinary incontinence depends on the cause. Infections of the bladder are treated with antibiotics. Urinary retention is treated with a bladder catheter.  Home care  Follow these guidelines when caring for yourself at home:    Don't consume foods and drinks that may irritate the bladder. These include drinks containing alcohol, caffeinate, or carbonation; chocolate; and acidic fruits and juices.    Limit fluid intake to 6 to 8 cups a day.    Lose weight if you are overweight. This will reduce your symptoms.    If needed, wear absorbent pads to catch urine. Change pads frequently to maintain hygiene and prevent skin and bladder infections.    Bathe daily to maintain good hygiene.    If an antibiotic was prescribed to treat a bladder infection, be sure to take it until finished, even if you are feeling better before then. This is to make sure your infection has cleared.    If a catheter was left in place, it is important to keep bacteria from getting into the collection bag. Don't disconnect the catheter from the collection bag.    Use a leg band to secure the catheter drainage tube, so it does not  pull on the catheter. Drain the collection bag when it becomes full using the drain spout at the bottom of the bag. Don't disconnect the bag from the catheter.    Don't pull on or try to remove a catheter. The catheter must be removed by a healthcare provider.  Follow-up care  Follow up with your healthcare provider, or as advised.  When to seek medical advice  Call your healthcare provider right away if any of these occur:    Fever over 100.4 F (38 C), or as directed by your healthcare provider    Bladder pain or fullness    Abdominal swelling, nausea, or vomiting    Back pain    Weakness, dizziness, or fainting    If a catheter was left in place, return if:  ? Catheter falls out  ? Catheter stops draining for 6 hours  Date Last Reviewed: 10/1/2017    0657-7142 The [employer]. [address] [place] [address]. All rights reserved. This information is not intended as a substitute for professional medical care. Always follow your healthcare professional's instructions.       Advance Directive  Patients advance directive was discussed and I am comfortable with the patients wishes.  Patient Education   Personalized Prevention Plan  You are due for the preventive services outlined below.  Your care team is available to assist you in scheduling these services.  If you have already completed any of these items, please share that information with your care team to update in your medical record.  Health Maintenance   Topic Date Due   ? MEDICARE ANNUAL WELLNESS VISIT  12/18/2021   ? FALL RISK ASSESSMENT  12/18/2021   ? ADVANCE CARE PLANNING  12/16/2024   ? TD 18+ HE  11/24/2027   ? Pneumococcal Vaccine: 65+ Years  Completed   ? INFLUENZA VACCINE RULE BASED  Completed   ? ZOSTER VACCINES  Completed   ? Pneumococcal Vaccine: Pediatrics (0 to 5 Years) and At-Risk Patients (6 to 64 Years)  Aged Out   ? HEPATITIS B VACCINES  Aged Out

## 2021-06-21 NOTE — LETTER
Letter by Elvira Crandall CMA at      Author: Elvira Crandall CMA Service: -- Author Type: --    Filed:  Encounter Date: 12/3/2020 Status: (Other)       Rei Nguyen,    We have your surgery with Dr. Banks arranged as follows:    Surgery Date: December 23rd     Location: [facility] & [address], [address]                  [place] [address]     Arrival Time: 12:00 PM (unless instructed otherwise by the preop nurse)    Prep Instructions:     1. Please schedule a pre-op physical with your primary care doctor. This may be virtual or face-to-face depending on your doctors preference. Call them right away to schedule this.    2. COVID19 testing is required within 4 days of surgery. We have this scheduled for you at [facility], [address] [place] [address] on Sunday December 20th at 10:00 AM. Follow the specific instructions you receive by Alonso. If your test is positive, your surgery will be canceled.     3. Nothing to eat or drink for 8 hours before surgery unless instructed differently by the preop nurse.    4. No blood thinners including aspirin for one week prior to surgery. Verify this is safe for you with your primary care doctor before stopping.     5. You need an adult to drive you home and stay with you 24 hours after surgery.     6. No visitors are allowed at the facility or in the building. Family/Friends who accompany the patient will need to wait in their vehicle or return home to be called when patient is ready for .    7. When you arrive to the surgery center, you will be screened for COVID19 symptoms. If you screen positive, your surgery will need to be postponed for your safety.    8. If the community sees a new surge in COVID19 hospital admissions, your procedure may need to be postponed. We will contact you if this happens.    9. We always encourage you to notify your insurance any time you have something scheduled including surgery.  The number is usually right on the back of your insurance card.     Call our office if you have any questions! Thank you!     Elvira Crandall  Surgery Scheduler  [facility]  Surgery [facility]  Direct line: [phone]

## 2021-06-22 NOTE — PROGRESS NOTES
This note has been dictated using voice recognition software. Any grammatical or context distortions are unintentional and inherent to the use of this software.     Assessment and Plan:       1. Routine general medical examination at a health care facility  Annual wellness visit completed today.   Will obtain fasting lipids and basic metabolic panel.  Will follow-up with patient regarding these results when available.  Patient had flu shot this year.  Discussed shingles vaccine.  Patient prefers to obtain this at pharmacy.  He is up-to-date on all other health maintenance.  Return to clinic in 1 year or sooner with any new concerns.  - Lipid Cascade RANDOM  - Basic Metabolic Panel    2. MCI (mild cognitive impairment)  Mild cognitive impairment.  Continue following up with Dr. Mcginnis for management.  Next appointment is in January 2019.    3. Prostate Cancer  History of prostate cancer, last PSA in 2017 was 0.1.  Will obtain another PSA today and consider referral to urology if PSA continues to rise.  - PSA, Annual Screen (Prostatic-Specific Antigen)    4. Bipolar Disorder  Stable on mirtazapine 30 mg by mouth daily.  - mirtazapine (REMERON) 30 MG tablet; Take 1 tablet (30 mg total) by mouth at bedtime.  Dispense: 90 tablet; Refill: 2    5. Osteoarthritis of left shoulder, unspecified osteoarthritis type  History of osteoarthritis and left shoulder.  Patient was previously evaluated by [facility] orthopedics.  He would like another referral placed today to discuss possible shoulder replacement.  - Ambulatory referral to Orthopedic Surgery    6.  Impaired fasting glucose  History of impaired fasting glucose.  Will check hemoglobin A1c today.  Encouraged healthy lifestyle modifications including diet and exercise.  - Glycosylated Hemoglobin A1c    The patient's current medical problems were reviewed.  The following health maintenance schedule was reviewed with the patient and provided in printed form in the  after visit summary:   Health Maintenance   Topic Date Due     ADVANCE DIRECTIVES DISCUSSED WITH PATIENT  05/10/1953     ZOSTER VACCINES (1 of 2) 05/10/1986     FALL RISK ASSESSMENT  05/10/2001     INFLUENZA VACCINE RULE BASED (1) 08/01/2018     TD 18+ HE  11/24/2027     PNEUMOCOCCAL POLYSACCHARIDE VACCINE AGE 65 AND OVER  Completed     PNEUMOCOCCAL CONJUGATE VACCINE FOR ADULTS (PCV13 OR PREVNAR)  Completed        Subjective:   Chief Complaint: Patrick Wharton is an 83 y.o. male here for an Annual Wellness visit.     HPI: Patient has been doing well over the last year.  Medical history includes impaired fasting glucose, last A1c at 6.1 in 2017.  He remains active and exercises regularly.  Weight is stable and at goal.  Patient has bipolar disorder.  Currently on mirtazapine.  Feels that this is working well for him.  Denies any mood concerns today.  Also has history of mild cognitive impairment.  Reports history of Alzheimer's disease in his mother.  He follows Dr. Mcginnis for management.  Next appointment is in January 2019.  He denies having any new concerns in regards to his memory.  History of prostate cancer which has been treated.  Last PSA done in 2017 was 0.1.     Review of Systems: Please see above.  The rest of the review of systems are negative for all systems.    Patient Care Team:  Selvin Riley MD as PCP - General     Patient Active Problem List   Diagnosis     Memory Lapses Or Loss     Impaired Fasting Glucose     Prostate Cancer     Bipolar Disorder     Benign Prostatic Hypertrophy     Prostate Cancer     Anemia     Foot swelling     MCI (mild cognitive impairment)     No past medical history on file.   No past surgical history on file.   No family history on file.   Social History     Socioeconomic History     Marital status:      Spouse name: Not on file     Number of children: Not on file     Years of education: Not on file     Highest education level: Not on file   Social Needs     Financial  "resource strain: Not on file     Food insecurity - worry: Not on file     Food insecurity - inability: Not on file     Transportation needs - medical: Not on file     Transportation needs - non-medical: Not on file   Occupational History     Not on file   Tobacco Use     Smoking status: Never Smoker   Substance and Sexual Activity     Alcohol use: Not on file     Drug use: Not on file     Sexual activity: Not on file   Other Topics Concern     Not on file   Social History Narrative     Not on file      Current Outpatient Medications   Medication Sig Dispense Refill     mirtazapine (REMERON) 30 MG tablet Take 1 tablet (30 mg total) by mouth at bedtime. 90 tablet 2     multivitamin (MEN'S MULTI-VITAMIN) per tablet Take 1 tablet by mouth daily.       tadalafil (CIALIS) 10 MG tablet Take 1 tablet (10 mg total) by mouth daily as needed for erectile dysfunction. 5 tablet 2     No current facility-administered medications for this visit.       Objective:   Vital Signs:   Visit Vitals  /60 (Patient Site: Left Arm, Patient Position: Sitting, Cuff Size: Adult Regular)   Pulse 71   Ht 5' 10\" (1.778 m)   Wt 140 lb 1.6 oz (63.5 kg)   SpO2 97%   BMI 20.10 kg/m         VisionScreening:  No exam data present     PHYSICAL EXAM    General Appearance:    Alert, cooperative, no distress, appears stated age.     Head:    Normocephalic, without obvious abnormality, atraumatic   Eyes:    PERRL, conjunctiva/corneas clear, EOM's intact, fundi     benign, both eyes        Ears:    Normal TM's and external ear canals, both ears   Nose:   Nares normal, septum midline, mucosa normal, no drainage    or sinus tenderness   Throat:   Lips, mucosa, and tongue normal; teeth and gums normal   Neck:   Supple, symmetrical, trachea midline, no adenopathy;        thyroid:  No enlargement/tenderness/nodules; no carotid    bruit or JVD   Back:     Symmetric, no curvature, ROM normal, no CVA tenderness   Lungs:     Clear to auscultation bilaterally, " respirations unlabored   Chest wall:    No tenderness or deformity   Heart:    Regular rate and rhythm, S1 and S2 normal, no murmur, rub   or gallop   Abdomen:     Soft, non-tender, bowel sounds active all four quadrants,     no masses, no organomegaly.     Genitalia:    Normal male without lesion, discharge or tenderness.  No inguinal hernia noted.     Rectal:    Normal tone.  Prostate normal/symmetric, no masses or tenderness.   Extremities:   Extremities normal, atraumatic, no cyanosis or edema   Pulses:   2+ and symmetric all extremities   Skin:   Skin color, texture, turgor normal, no rashes or lesions   Lymph nodes:   Cervical, supraclavicular, and axillary nodes normal   Neurologic:   CNII-XII intact. Normal strength, sensation and reflexes       throughout         Assessment Results 12/5/2018   Activities of Daily Living No help needed   Instrumental Activities of Daily Living No help needed   Get Up and Go Score Less than 12 seconds   Mini Cog Total Score 4   Some recent data might be hidden     A Mini-Cog score of 0-2 suggests the possibility of dementia, score of 3-5 suggests no dementia    Identified Health Risks:     The patient was counseled and encouraged to consider modifying their diet and eating habits. He was provided with information on recommended healthy diet options.  The patient was provided with written information regarding signs of hearing loss.  Patient's advanced directive was discussed and I am comfortable with the patient's wishes.

## 2021-06-23 NOTE — PROGRESS NOTES
Persons accompanying you (the patient) today: Himself    How have you been doing since we saw you last? Please note any concerns.  No concerns at this time.     Please list any recent hospitalizations/surgeries/procedures you've had since we saw you last:None     Have you had any falls since your last visit? No    Do you have any pain today? No

## 2021-06-23 NOTE — PROGRESS NOTES
Assessment / Impression   1.  Amnestic mild cognitive impairment, stable      Plan:   1.  Wellness programming  2.  Follow-up as needed and in 1 year for conversation    Brief conversation held with the patient who appears to be doing well at this point in time.  Unaccompanied today, Patrick reports no significant changes in cognition or function.  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  He notes no changes and he certainly appears to be doing well at this time.  Again we reviewed the concept of MCI and its relationship to a variety of ailments.  At the patient's current age and number of etiologies could explain the deficits noted on testing.    Follow-up in 1 year      Subjective:     HPI: Patrick Wharton is a 83 y.o. male with above-noted diagnoses who returns for follow-up.  The patient reports no significant change since last seen.  I note his wife as well as other family members are not present.  Patrick reports that they have no concerns at this point in time.          Review of Systems:          The patient denies somatic complaints.  He does report occasional forgetfulness but he continues to live in a independent living apartment with his elderly wife.  Both appear to be getting along well.        Objective:   There were no vitals filed for this visit.    No results found for this or any previous visit (from the past 24 hour(s)).    Physical Exam: Neatly groomed and casually dressed, the patient seated for evaluation.  He was on time for his appointment today.  I note his speech to be fluent and thoughts to generated a normal rate.  Good organization of thoughts noted.  No difficulties with attentional function.

## 2021-06-24 NOTE — PROGRESS NOTES
"Preoperative Exam    Scheduled Procedure: left shoulder repair  Surgery Date:  3/15/19  Surgery Location: [facility] [place], [place], fax [phone]    Surgeon:  Dr Monet    Assessment/Plan:     1. Preop general physical exam  - HM2(CBC w/o Differential)  - Electrocardiogram Perform and Read    2. Primary osteoarthritis of left shoulder      Surgical Procedure Risk: Low (reported cardiac risk generally < 1%)  Have you had prior anesthesia?: Yes  Have you or any family members had a previous anesthesia reaction:  Trouble with intubation. Prev anethesia issue  Do you or any family members have a history of a clotting or bleeding disorder?: No  Cardiac Risk Assessment: no increased risk for major cardiac complications    Patient approved for surgery with general or local anesthesia.    Please Note:  Pt reports he has limited ability to open mouth wide. He reports that he has been labeled a \"difficult intubation\" in the past.     Functional Status: Independent  Patient plans to recover at home with family.     Subjective:      Patrick Wharton is a 83 y.o. male who presents for a preoperative consultation.  He has a planned left total shoulder on 3/15/19. Left shoulder pain is worsening, difficulty with putting on clothing. He has tried tylenol without relief. Medical history includes impaired fasting glucose, last A1c at 6.1 in 2017.  He remains active and exercises regularly.  Weight is stable and at goal.  Patient has bipolar disorder.  Currently on mirtazapine. Reports history of cognitive impairment and follows with Dr. Mcginnis. This has been stable. He quit taking vitamin e a few weeks ago. Does not take ibuprofen. He reports he has been labeled a \"difficult intubation\" in the past and lost a tooth with a previous surgery.     All other systems reviewed and are negative, other than those listed in the HPI.    Pertinent History  Do you have difficulty breathing or chest pain after walking up a flight of stairs: " No  History of obstructive sleep apnea: No  Steroid use in the last 6 months: No  Frequent Aspirin/NSAID use: no   Prior Blood Transfusion: No  Prior Blood Transfusion Reaction: No  If for some reason prior to, during or after the procedure, if it is medically indicated, would you be willing to have a blood transfusion?:  There is no transfusion refusal.    Current Outpatient Medications   Medication Sig Dispense Refill     mirtazapine (REMERON) 30 MG tablet Take 1 tablet (30 mg total) by mouth at bedtime. 90 tablet 2     multivitamin (MEN'S MULTI-VITAMIN) per tablet Take 1 tablet by mouth daily.       sildenafil, antihypertensive, (REVATIO) 20 mg tablet Take 2-3 tablets (40-60 mg) as needed.  Did not take more than once in a 24-hour period.. 30 tablet 0     tadalafil (CIALIS) 10 MG tablet Take 1 tablet (10 mg total) by mouth daily as needed for erectile dysfunction. 5 tablet 2     No current facility-administered medications for this visit.         No Known Allergies    Patient Active Problem List   Diagnosis     Memory Lapses Or Loss     Impaired Fasting Glucose     Prostate Cancer     Bipolar Disorder     Benign Prostatic Hypertrophy     Prostate Cancer     Anemia     Foot swelling     MCI (mild cognitive impairment)     Routine general medical examination at a health care facility     Pre-diabetes     Osteoarthritis of left shoulder, unspecified osteoarthritis type       No past medical history on file.    No past surgical history on file.    Social History     Socioeconomic History     Marital status:      Spouse name: Not on file     Number of children: Not on file     Years of education: Not on file     Highest education level: Not on file   Occupational History     Not on file   Social Needs     Financial resource strain: Not on file     Food insecurity:     Worry: Not on file     Inability: Not on file     Transportation needs:     Medical: Not on file     Non-medical: Not on file   Tobacco Use      "Smoking status: Never Smoker   Substance and Sexual Activity     Alcohol use: Not on file     Drug use: Not on file     Sexual activity: Not on file   Lifestyle     Physical activity:     Days per week: Not on file     Minutes per session: Not on file     Stress: Not on file   Relationships     Social connections:     Talks on phone: Not on file     Gets together: Not on file     Attends Jew service: Not on file     Active member of club or organization: Not on file     Attends meetings of clubs or organizations: Not on file     Relationship status: Not on file     Intimate partner violence:     Fear of current or ex partner: Not on file     Emotionally abused: Not on file     Physically abused: Not on file     Forced sexual activity: Not on file   Other Topics Concern     Not on file   Social History Narrative     Not on file       Patient Care Team:  Selvin Riley MD as PCP - General          Objective:     There were no vitals filed for this visit.      Physical Exam:   /62   Pulse 76   Ht 5' 10\" (1.778 m)   Wt 143 lb 6.4 oz (65 kg)   SpO2 98%   BMI 20.58 kg/m    General appearance: alert, appears stated age and cooperative  Head: Normocephalic, without obvious abnormality, atraumatic  Eyes: negative  Ears: abnormal TM right ear - could not see and large amount of cerumen and abnormal TM left ear - could not see and large amount of cerumen  Nose: Nares normal. Septum midline. Mucosa normal. No drainage or sinus tenderness.  Throat: narrow jaw, 1 missing tooth  Lungs: clear to auscultation bilaterally  Heart: regular rate and rhythm, S1, S2 normal, no murmur, click, rub or gallop  Extremities: extremities normal, atraumatic, no cyanosis or edema  Skin: Skin color, texture, turgor normal. No rashes or lesions  Lymph nodes: Cervical, supraclavicular, and axillary nodes normal.  Neurologic: Grossly normal     There are no Patient Instructions on file for this visit.    EKG:  Normal sinus rhythm "   Junctional ST depression, probably normal   Borderline ECG   When compared with ECG of 03-JUN-2016 11:21,   No significant change was found     Labs:  Recent Results (from the past 24 hour(s))   HM2(CBC w/o Differential)    Collection Time: 03/01/19  4:08 PM   Result Value Ref Range    WBC 5.4 4.0 - 11.0 thou/uL    RBC 4.11 (L) 4.40 - 6.20 mill/uL    Hemoglobin 13.4 (L) 14.0 - 18.0 g/dL    Hematocrit 40.6 40.0 - 54.0 %    MCV 99 80 - 100 fL    MCH 32.6 27.0 - 34.0 pg    MCHC 33.0 32.0 - 36.0 g/dL    RDW 12.0 11.0 - 14.5 %    Platelets 238 140 - 440 thou/uL    MPV 7.0 7.0 - 10.0 fL   Electrocardiogram Perform and Read    Collection Time: 03/01/19  4:17 PM   Result Value Ref Range    SYSTOLIC BLOOD PRESSURE  mmHg    DIASTOLIC BLOOD PRESSURE  mmHg    VENTRICULAR RATE 78 BPM    ATRIAL RATE 78 BPM    P-R INTERVAL 148 ms    QRS DURATION 84 ms    Q-T INTERVAL 380 ms    QTC CALCULATION (BEZET) 433 ms    P Axis 80 degrees    R AXIS 24 degrees    T AXIS 12 degrees    MUSE DIAGNOSIS       Normal sinus rhythm  Junctional ST depression, probably normal  Borderline ECG  When compared with ECG of 03-JUN-2016 11:21,  No significant change was found         Immunization History   Administered Date(s) Administered     DT (pediatric) 05/18/1995, 08/18/2005     Influenza [id number], 12/23/2009     Influenza high dose, seasonal 10/06/2015, 09/26/2016, 09/19/2017     Influenza, Seasonal, Inj PF IIV3 10/07/2010     Influenza, inj, historic,unspecified 12/04/2007, 09/26/2016, 10/01/2018     Influenza, seasonal,quad inj 6-35 mos 12/15/2008, 10/05/2009, 10/07/2010, 11/01/2011, 11/09/2012, 10/14/2013     Influenza,seasonal quad, PF 11/17/2014     Influenza,seasonal quad, PF, 36+MOS 11/17/2014     Influenza,seasonal, Inj IIV3 11/16/2005, 12/04/2007, 12/15/2008, 10/05/2009, 11/01/2011, 11/09/2012, 10/14/2013     Pneumo Conj 13-V (2010&after) 11/22/2016     Pneumo Polysac 23-V 01/01/2001, 11/17/2014     Td, Adult, Absorbed 08/18/2005      Td,adult,historic,unspecified 08/18/2005     Tdap 11/24/2017           Electronically signed by Selvin Riley MD 03/01/19 3:55 PM

## 2021-06-25 NOTE — ANESTHESIA CARE TRANSFER NOTE
Last vitals:   Vitals:    03/15/19 0954   BP: 121/72   Pulse: 84   Resp: 18   Temp: 36.5  C (97.7  F)   SpO2: 100%     Patient's level of consciousness is drowsy  Spontaneous respirations: yes  Maintains airway independently: yes  Dentition unchanged: yes  Oropharynx: oropharynx clear of all foreign objects    QCDR Measures:  ASA# 20 - Surgical Safety Checklist: WHO surgical safety checklist completed prior to induction    PQRS# 430 - Adult PONV Prevention: 4558F - Pt received => 2 anti-emetic agents (different classes) preop & intraop  ASA# 8 - Peds PONV Prevention: 4558F - Pt received => 2 anti-emetic agents (different classes) preop & intraop  PQRS# 424 - Rocio-op Temp Management: 4559F - At least one body temp DOCUMENTED => 35.5C or 95.9F within required timeframe  PQRS# 426 - PACU Transfer Protocol: - Transfer of care checklist used  ASA# 14 - Acute Post-op Pain: ASA14B - Patient did NOT experience pain >= 7 out of 10

## 2021-06-25 NOTE — ANESTHESIA PROCEDURE NOTES
Peripheral Block    Patient location during procedure: pre-op  Start time: 3/15/2019 6:50 AM  End time: 3/15/2019 6:54 AM  post-op analgesia per surgeon order as noted in medical record  Staffing:  Performing  Anesthesiologist: Arjun Han MD  Preanesthetic Checklist  Completed: patient identified, site marked, risks, benefits, and alternatives discussed, timeout performed, consent obtained, airway assessed, oxygen available, suction available, emergency drugs available and hand hygiene performed  Peripheral Block  Block type: brachial plexus, interscalene  Prep: ChloraPrep  Patient position: supine  Patient monitoring: cardiac monitor, continuous pulse oximetry, heart rate and blood pressure  Laterality: left  Injection technique: ultrasound guided and nerve stimulator  Nerve Stimulator mAmp: 0.4  Ultrasound used to visualize needle placement in proximity to nerve being blocked: yes   Permanent ultrasound image captured for medical record  Sterile gel and probe cover used for ultrasound.    Needle  Needle type: Stimuplex   Needle gauge: 20G  Needle length: 4 in  no peripheral nerve catheter placed  Assessment  Injection assessment: negative aspiration for heme, no paresthesia on injection, incremental injection and no difficulty with injection  Additional Notes  Finger/hand twitching noted upon nerve bundle proximation.  30ml 0.5% Bupivacaine w/ epi 1:200K injected in 5ml increments.

## 2021-06-25 NOTE — ANESTHESIA PREPROCEDURE EVALUATION
Anesthesia Evaluation      Patient summary reviewed   History of anesthetic complications (Difficult intubation previously)     Airway   Mallampati: III  Neck ROM: full  Comment: Airway appears anterior.  Patient opens mouth wide enough for Glidescope.  FROM at neck with good extension.  Appears to be a very maskable airway.   Pulmonary - negative ROS and normal exam    breath sounds clear to auscultation                         Cardiovascular - negative ROS and normal exam   Neuro/Psych - negative ROS   (+) depression,     Endo/Other - negative ROS      GI/Hepatic/Renal - negative ROS      Other findings: Missing lower tooth from previous difficult intubation.      Dental - normal exam                        Anesthesia Plan  Planned anesthetic: general endotracheal and peripheral nerve block  Peripheral nerve block for post-op analgesia as ordered by surgeon.  Saphenous nerve block.    We will use succinylcholine on induction with a Glidescope assisted intubation.  Difficult airway cart immediately available in the OR.  LMA insertion, if necessary, appears feasible.  ASA 2   Induction: intravenous   Anesthetic plan and risks discussed with: patient and spouse  Anesthesia plan special considerations: video-assisted, increased risk of difficult airway,   Post-op plan: routine recovery

## 2021-06-25 NOTE — ANESTHESIA POSTPROCEDURE EVALUATION
Patient: Patrick Wharton  LEFT TOTAL SHOULDER ARTHROPLASTY  Anesthesia type: general    Patient location: PACU  Last vitals:   Vitals:    03/15/19 1100   BP: 123/81   Pulse: 76   Resp: 14   Temp: 36.1  C (96.9  F)   SpO2: 95%     Post vital signs: stable  Level of consciousness: awake and responds to simple questions  Post-anesthesia pain: pain controlled  Post-anesthesia nausea and vomiting: no  Pulmonary: unassisted, return to baseline  Cardiovascular: stable and blood pressure at baseline  Hydration: adequate  Anesthetic events: no    QCDR Measures:  ASA# 11 - Rocio-op Cardiac Arrest: ASA11B - Patient did NOT experience unanticipated cardiac arrest  ASA# 12 - Rocio-op Mortality Rate: ASA12B - Patient did NOT die  ASA# 13 - PACU Re-Intubation Rate: ASA13B - Patient did NOT require a new airway mgmt  ASA# 10 - Composite Anes Safety: ASA10A - No serious adverse event    Additional Notes:

## 2021-07-03 NOTE — ADDENDUM NOTE
Addendum Note by Kristi Sanchez RN at 1/22/2018  3:15 PM     Author: Kristi Sanchez RN Service: -- Author Type: Registered Nurse    Filed: 1/22/2018  3:15 PM Date of Service: 1/22/2018  3:15 PM Status: Signed    : Kristi Sanchez RN (Registered Nurse)    Encounter addended by: Kristi Sanchez RN on: 1/22/2018  3:15 PM<BR>     Actions taken: Visit diagnoses modified, Charge Capture section accepted

## 2021-07-03 NOTE — ADDENDUM NOTE
Addendum Note by Lucia Franco at 12/18/2020  9:40 AM     Author: Lucia Franco Service: -- Author Type:     Filed: 12/18/2020  4:47 PM Encounter Date: 12/18/2020 Status: Signed    : Lucia Franco ()    Addended by: LUCIA FRANCO on: 12/18/2020 04:47 PM        Modules accepted: Orders

## 2021-07-03 NOTE — ANESTHESIA PREPROCEDURE EVALUATION
Anesthesia Preprocedure Evaluation by Tristan Schilling MD at 12/23/2020  7:23 AM     Author: Tristan Schilling MD Service: -- Author Type: Physician    Filed: 12/23/2020  8:37 AM Date of Service: 12/23/2020  7:23 AM Status: Addendum    : Tristan Schilling MD (Physician)    Related Notes: Original Note by Tristan Schilling MD (Physician) filed at 12/23/2020  8:34 AM       Anesthesia Evaluation      Patient summary reviewed   History of anesthetic complications (Difficult intubation 2007 with damage to tooth.  Pt was intubated by glidescope without difficulty 3/2019.)     Airway   Mallampati: IV  Neck ROM: full  Comment: Overbite with moderate limitation to mouth opening.    Feasible with Glidescope.   Pulmonary - negative ROS and normal exam                          Cardiovascular - negative ROS and normal exam  Exercise tolerance: > or = 4 METS  ECG reviewed        Neuro/Psych    (+) depression, anxiety/panic attacks, dementia (mild cognitive impairment),     Comments: [place] (hard of hearing)   Bipolar 1 disorder            Endo/Other - negative ROS   Diabetes: prediabetes.     GI/Hepatic/Renal    (-) GERD    Comments: Prostate CA     unilateral inguinal hernia      Other findings: COVID NEG 12/20/20      Dental    (+) poor dentition                           Anesthesia Plan  Planned anesthetic: MAC  H/o difficult intubation, prn GETA with Glidescope  ASA 2     Anesthetic plan and risks discussed with: patient  Anesthesia plan special considerations: increased risk of difficult airway, antiemetics,   Post-op plan: routine recovery

## 2021-08-13 ENCOUNTER — OFFICE VISIT (OUTPATIENT)
Dept: NEUROLOGY | Facility: CLINIC | Age: 86
End: 2021-08-13
Payer: COMMERCIAL

## 2021-08-13 VITALS
WEIGHT: 143 LBS | DIASTOLIC BLOOD PRESSURE: 71 MMHG | HEART RATE: 75 BPM | HEIGHT: 70 IN | BODY MASS INDEX: 20.47 KG/M2 | SYSTOLIC BLOOD PRESSURE: 112 MMHG

## 2021-08-13 DIAGNOSIS — G31.84 MCI (MILD COGNITIVE IMPAIRMENT): Primary | ICD-10-CM

## 2021-08-13 PROCEDURE — 99214 OFFICE O/P EST MOD 30 MIN: CPT | Performed by: PSYCHIATRY & NEUROLOGY

## 2021-08-13 RX ORDER — DIVALPROEX SODIUM 250 MG/1
250 TABLET, DELAYED RELEASE ORAL DAILY
Qty: 90 TABLET | Refills: 3 | Status: SHIPPED | OUTPATIENT
Start: 2021-08-13 | End: 2022-08-15

## 2021-08-13 RX ORDER — RIVASTIGMINE TARTRATE 3 MG/1
3 CAPSULE ORAL 2 TIMES DAILY
Qty: 60 CAPSULE | Refills: 11 | Status: SHIPPED | OUTPATIENT
Start: 2021-08-13 | End: 2021-09-07

## 2021-08-13 ASSESSMENT — MONTREAL COGNITIVE ASSESSMENT (MOCA)
8. SERIAL SUBTRACTION OF 7S: 3
4. NAME EACH OF THE THREE ANIMALS SHOWN: 3
9. REPEAT EACH SENTENCE: 2
12. MEMORY INDEX SCORE: 0
WHAT LEVEL OF EDUCATION WAS ATTAINED: 0
10. [FLUENCY] NAME WORDS STARTING WITH DESIGNATED LETTER: 1
WHAT IS THE TOTAL SCORE (OUT OF 30): 23
VISUOSPATIAL/EXECUTIVE SUBSCORE: 5
6. READ LIST OF DIGITS [FORWARD/BACKWARD]: 1
11. FOR EACH PAIR OF WORDS, WHAT CATEGORY DO THEY BELONG TO (OUT OF 2): 2
13. ORIENTATION SUBSCORE: 6
7. [VIGILENCE] TAP WHEN HEARING DESIGNATED LETTER: 0

## 2021-08-13 ASSESSMENT — MIFFLIN-ST. JEOR: SCORE: 1339.89

## 2021-08-13 NOTE — LETTER
8/13/2021         RE: Patrick Wharton  [address]  [facility] [address]        Dear Colleague,    Thank you for referring your patient, Patrick Wharton, to the [facility] [place]. Please see a copy of my visit note below.    In person evaluation    HPI  January 1, 2020, in person consultation transfer of care from Dr. Vikas Mcginnis  4/27/2020, telephone visit  8/3/2020, telephone visit  11/6/2020, video visit  8/13/2021, in person visit      86-year-old followed neurologically for:  Cognitive decline (memory loss)  Onset as far back as 2015  Difficulty remembering people's names  Previously cared for by Dr. Vikas Mcginnis   history of bipolar disorder  Abnormal EEG    Since I last saw the patient  No hospitalizations  No surgeries  No major illnesses  No Covid illness  Did get the Covid vaccine    Patient feels that he has had a falloff in his memory has more trouble tracking and following conversations  Used to go to a group with his retired friends from  but they cannot get together due to Covid  Has severe hearing loss in both ears  Hearing doctor thought that they could maybe make a hearing aid that would work on the left ear but the not sure  With the mask on and talking to him he misses a lot of the conversation though  I am sure this is causing some difficulty with his memory recall and tracking conversations    Patient is still coherent can should chat about multiple topics  Can jump from topic to topic fairly easy  Does better when we talk about more things from the past  Scored a 23 out of 30, (8/13/2021)  Scored a 25 out of 30, (January 2020)  Subtle change over a year and a half    We talked about different approaches and whether he should start some medication  The diet is quite a bit and does not eat a lot  We could try a low-dose of Exelon 3 mg twice daily  If he gets any GI distress we would stop it  Continue with the Depakote low-dose  Follow-up in 6 months    Neurologic  summary:  Previously followed by Dr. Vikas Mcginnis  Mild cognitive impairment as far back as   Patient works for [employer] as a   Retired in   [employer] testing 2020, 25 out of 30  Seems to have difficulty remembering people's names  Has significant hearing loss right greater than left left is not enough to be helped by hearing aid  Past history of bipolar disorder  Mother with dementia in the last year of her life at age 90  Uncle last year of life age 88 developed dementia  In the distant past of been on Depakote for 2 to 3 years for bipolar disorder  Has an abnormal EEG  Patient restarted on Depakote   Talked about the pros and cons of using this to maybe stabilize the temporal lobe see if his symptoms are little less severe may be slow down the progression  His daughter has depression and is on Depakote    Patient tends to be quite active rows or exercises on the exercise bike regularly  Watches his diet hemoglobin A1c's have been good  Tries to stay active volunteers and participates in activities      Past medical history  Mild cognitive impairment  Bipolar disorder  Prostate cancer  Benign prostatic hypertrophy  Seborrheic dermatitis  Osteoarthritis with shoulder surgery  Left leg edema    Habits   Non-smoker  Does not drink alcohol  Worked as a [employer]  retired in   Lives in [facility] apartment with elevator  Does exercise regularly stays active      Family history  Mother had glaucoma congestive heart failure dementia  about age 90  Father  at 34 with testicular cancer  He has 1 sister with some diabetes  Daughter with depression treated with Depakote  Uncle with difficulty with memory at the age of 88 last year of his life      Work-up review  MRI scan brain 2016 mild to moderate atrophy progressed mildly from   Laboratory data 2019  Sodium 140 potassium 4.9 BUN 8 creatinine 0.8 glucose 94  White blood count 6.3 hemoglobin 13.2 platelets 313,000  Hemoglobin A1c  5.8%  Outside records from Dr. Mcginnis January 2018, January 2018 reviewed  EEG January 21, 2020 rare F7/T3 sharp waves with RAINE activity with hyperventilation mild to moderately abnormal  B12 689  TSH 1.57  [employer]  1/20/2020,    25 out of 30   8/13/2021,   23 out of 30     review of systems    Chronic hearing loss seems to be worse than last time  Patient wearing the mask so it is difficult to communicate as he cannot read her lips    No headache no chest pain or shortness of breath no nausea vomiting no diarrhea no fever chills no abdominal pain no cough no sore throat    No diplopia dysarthria dysphagia  No focal weakness  No tremor  Gait is good    Has terrible hearing     Has impotence due to prostate hypertrophy and history of prostate cancer    Otherwise review systems negative    General exam  Alert oriented x3   blood pressure 112/71, pulse 75 temperature 98.0  HEENT significant for hearing loss in left ear and right ear poor hearing and has a hearing aid  Lungs clear  Abdomen soft  No edema the feet  Patient fan only eats 1 meal per day    Neurologic exam  Alert orient x3  Normal prosody speech  Normal naming  Normal comprehension  Normal repetition  No aphasia  No neglect  Previous [place] 25 out of 30 January 2020   [employer] 23 out of 30    Cranial 2 through 12 are significant for  Deaf in left ear significant hearing loss of the other  No ophthalmoplegia  No nystagmus  Face symmetrical  Tongue twisters okay  Visual fields intact    Upper extremities  No drift no tremor normal finger-nose    Lower extremities  Distal proximal strength good    Gait  Sits up in the chair walks out to the sink and back was a little bit antalgic needs to be somewhat careful    Walks a mile and a quarter every day without difficulty    Recommend that he uses a walking stick for his walks for safety        Assessment/Plan     1.  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)        Mild cognitive impairment      Onset as far  back as 2015       Family history of dementia in the later years mom and uncle       History of bipolar disorder       Abnormal EEG F7/T3 sharp wave         Depakote 250 mg 1 tablet nightly       Exelon 3 mg capsule twice daily (added 8/13/2021)    Tolerating medication  Follow-up in 6 months  Discussed the pros and cons of trying a medication to help prevent further progression of difficulties    Patient will call sooner if there is any problems      We talked about different approaches and whether he should start some medication  Eats 1 meal a day discussed important to have good nutrition  Does exercise though regularly and stays active   We could try a low-dose of Exelon 3 mg twice daily  If he gets any GI distress we would stop it  Continue with the Depakote low-dose  Follow-up in 6 months    34 minutes total care time today      Again, thank you for allowing me to participate in the care of your patient.        Sincerely,        Ramon Rene MD

## 2021-08-13 NOTE — PROGRESS NOTES
In person evaluation    HPI  January 1, 2020, in person consultation transfer of care from Dr. Vikas Mcginnis  4/27/2020, telephone visit  8/3/2020, telephone visit  11/6/2020, video visit  8/13/2021, in person visit      86-year-old followed neurologically for:  Cognitive decline (memory loss)  Onset as far back as 2015  Difficulty remembering people's names  Previously cared for by Dr. Vikas Mcginnis   history of bipolar disorder  Abnormal EEG    Since I last saw the patient  No hospitalizations  No surgeries  No major illnesses  No Covid illness  Did get the Covid vaccine    Patient feels that he has had a falloff in his memory has more trouble tracking and following conversations  Used to go to a group with his retired friends from [employer] but they cannot get together due to Covid  Has severe hearing loss in both ears  Hearing doctor thought that they could maybe make a hearing aid that would work on the left ear but the not sure  With the mask on and talking to him he misses a lot of the conversation though  I am sure this is causing some difficulty with his memory recall and tracking conversations    Patient is still coherent can should chat about multiple topics  Can jump from topic to topic fairly easy  Does better when we talk about more things from the past  Scored a 23 out of 30, (8/13/2021)  Scored a 25 out of 30, (January 2020)  Subtle change over a year and a half    We talked about different approaches and whether he should start some medication  The diet is quite a bit and does not eat a lot  We could try a low-dose of Exelon 3 mg twice daily  If he gets any GI distress we would stop it  Continue with the Depakote low-dose  Follow-up in 6 months    Neurologic summary:  Previously followed by Dr. Vikas Mcginnis  Mild cognitive impairment as far back as 2015  Patient works for [employer] as a   Retired in 1998  [employer] testing January 2020, 25 out of 30  Seems to have difficulty remembering people's names  Has significant  hearing loss right greater than left left is not enough to be helped by hearing aid  Past history of bipolar disorder  Mother with dementia in the last year of her life at age 90  Uncle last year of life age 88 developed dementia  In the distant past of been on Depakote for 2 to 3 years for bipolar disorder  Has an abnormal EEG  Patient restarted on Depakote   Talked about the pros and cons of using this to maybe stabilize the temporal lobe see if his symptoms are little less severe may be slow down the progression  His daughter has depression and is on Depakote    Patient tends to be quite active rows or exercises on the exercise bike regularly  Watches his diet hemoglobin A1c's have been good  Tries to stay active volunteers and participates in activities      Past medical history  Mild cognitive impairment  Bipolar disorder  Prostate cancer  Benign prostatic hypertrophy  Seborrheic dermatitis  Osteoarthritis with shoulder surgery  Left leg edema    Habits   Non-smoker  Does not drink alcohol  Worked as a [employer]  retired in   Lives in seniors apartment with elevator  Does exercise regularly stays active      Family history  Mother had glaucoma congestive heart failure dementia  about age 90  Father  at 34 with testicular cancer  He has 1 sister with some diabetes  Daughter with depression treated with Depakote  Uncle with difficulty with memory at the age of 88 last year of his life      Work-up review  MRI scan brain 2016 mild to moderate atrophy progressed mildly from 2010  Laboratory data 2019  Sodium 140 potassium 4.9 BUN 8 creatinine 0.8 glucose 94  White blood count 6.3 hemoglobin 13.2 platelets 313,000  Hemoglobin A1c 5.8%  Outside records from Dr. Mcginnis 2018, 2018 reviewed  EEG 2020 rare F7/T3 sharp waves with RAINE activity with hyperventilation mild to moderately abnormal  B12 689  TSH 1.57  [employer]  2020,    25 out of 30   2021,   23  out of 30     review of systems    Chronic hearing loss seems to be worse than last time  Patient wearing the mask so it is difficult to communicate as he cannot read her lips    No headache no chest pain or shortness of breath no nausea vomiting no diarrhea no fever chills no abdominal pain no cough no sore throat    No diplopia dysarthria dysphagia  No focal weakness  No tremor  Gait is good    Has terrible hearing     Has impotence due to prostate hypertrophy and history of prostate cancer    Otherwise review systems negative    General exam  Alert oriented x3   blood pressure 112/71, pulse 75 temperature 98.0  HEENT significant for hearing loss in left ear and right ear poor hearing and has a hearing aid  Lungs clear  Abdomen soft  No edema the feet  Patient fan only eats 1 meal per day    Neurologic exam  Alert orient x3  Normal prosody speech  Normal naming  Normal comprehension  Normal repetition  No aphasia  No neglect  Previous [place] 25 out of 30 January 2020   [employer] 23 out of 30    Cranial 2 through 12 are significant for  Deaf in left ear significant hearing loss of the other  No ophthalmoplegia  No nystagmus  Face symmetrical  Tongue twisters okay  Visual fields intact    Upper extremities  No drift no tremor normal finger-nose    Lower extremities  Distal proximal strength good    Gait  Sits up in the chair walks out to the sink and back was a little bit antalgic needs to be somewhat careful    Walks a mile and a quarter every day without difficulty    Recommend that he uses a walking stick for his walks for safety        Assessment/Plan     1.  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)        Mild cognitive impairment      Onset as far back as 2015       Family history of dementia in the later years mom and uncle       History of bipolar disorder       Abnormal EEG F7/T3 sharp wave         Depakote 250 mg 1 tablet nightly       Exelon 3 mg capsule twice daily (added  8/13/2021)    Tolerating medication  Follow-up in 6 months  Discussed the pros and cons of trying a medication to help prevent further progression of difficulties    Patient will call sooner if there is any problems      We talked about different approaches and whether he should start some medication  Eats 1 meal a day discussed important to have good nutrition  Does exercise though regularly and stays active   We could try a low-dose of Exelon 3 mg twice daily  If he gets any GI distress we would stop it  Continue with the Depakote low-dose  Follow-up in 6 months    34 minutes total care time today

## 2021-08-13 NOTE — NURSING NOTE
Chief Complaint   Patient presents with     Memory Loss     Follow up. Pt states memory has gotten worse.     Rolanda Lomeli LPN on 8/13/2021 at 11:16 AM

## 2021-08-13 NOTE — NURSING NOTE
JULIAN COGNITIVE ASSESSMENT ([facility])  Version 7.1 Original Version  VISUOSPATIAL/EXECUTIVE               COPY CUBE      [  1  ]                                [ 1   ] DRAW CLOCK (Ten past eleven)  (3 points)    [ 1   ]                    [   1 ]               [  1  ]       Contour            Numbers     Hands POINTS                  5 / 5   NAMING    [ 1  ]                                                                        [  1  ]                                             [   1 ]  Lishabnam Hickey                                Camel                   3  / 3   MEMORY Read list of words, subject must repeat them. Do 2 trials, even if 1st trial is successful. Do a recall after 5 minutes  FACE VELVET Hindu FIDEL RED No Points    1st          2nd         ATTENTION Read list of digits (1 digit/sec) Subject has to repeat in the forward order       [  1  ]   2  1  8  5  4                                [ 0   ] 7 4 2                         1 /2   Read list of letters. The subject must tap with his hand at each letter A. No points if > 2 errors.  [  0  ] F B A C M N A A J K L B A F A K D E A A A J A M O F A A B             0 /1   Serial 7 subtraction starting at 100          [ 1   ] 93         [ 1   ] 86          [  1  ] 79          [ 1   ] 72         [ 0   ] 65   4 or 5 correct subtractions: 3 points,  2 or 3 correct: 2 points,  1correct: 1 point,   0 correct: 0 points           3 /3   LANGUAGE Repeat: I only know that Patrick is the one to help today. [ 1    ]                                      The cat always hid under the couch when dogs were in the room. [ 1  ]             2  /2   Fluency: Name maximum number of words in one minute that begin with the letter F                                                                                                                    [ 1   ] __16_ (N > 11 words)              1 /1   ABSTRACTION Similarity  between e.g. banana-orange=fruit                                                                   [   1 ] train-bicycle                      [   1 ] watch-ruler             2 /2   DELAYED  RECALL Has to recall words  WITH NO CUE FACE  [ 0   ] VELVET  [  0  ] Protestant  [  0  ]  FIDEL  [  0  ] RED  [  0  ] Points for UNCUED recall only           0 /5           OPTIONAL Category cue           Multiple choice cue          ORIENTATION  [  1  ] Date     [ 1   ] Month       [  1  ] Year      [ 1   ] Day      [  1  ] Place        [  1  ] City         6 /6   TOTAL  Normal > 26/30 Add 1 point if < 12 years education      23 /30

## 2021-09-06 DIAGNOSIS — G31.84 MCI (MILD COGNITIVE IMPAIRMENT): ICD-10-CM

## 2021-09-07 RX ORDER — RIVASTIGMINE TARTRATE 3 MG/1
3 CAPSULE ORAL 2 TIMES DAILY
Qty: 60 CAPSULE | Refills: 11 | Status: SHIPPED | OUTPATIENT
Start: 2021-09-07 | End: 2022-07-11

## 2021-09-07 NOTE — TELEPHONE ENCOUNTER
Refill request for rivastigmine.  Please deny . Refill sent on 8/13/21.    Rolanda Lomeli LPN on 9/7/2021 at 10:58 AM

## 2021-09-11 ENCOUNTER — HEALTH MAINTENANCE LETTER (OUTPATIENT)
Age: 86
End: 2021-09-11

## 2021-10-02 ASSESSMENT — ACTIVITIES OF DAILY LIVING (ADL): CURRENT_FUNCTION: NO ASSISTANCE NEEDED

## 2021-10-04 PROBLEM — K40.90 NON-RECURRENT UNILATERAL INGUINAL HERNIA WITHOUT OBSTRUCTION OR GANGRENE: Status: ACTIVE | Noted: 2020-12-03

## 2021-10-05 ENCOUNTER — OFFICE VISIT (OUTPATIENT)
Dept: FAMILY MEDICINE | Facility: CLINIC | Age: 86
End: 2021-10-05
Payer: COMMERCIAL

## 2021-10-05 VITALS
WEIGHT: 144 LBS | SYSTOLIC BLOOD PRESSURE: 130 MMHG | HEART RATE: 70 BPM | DIASTOLIC BLOOD PRESSURE: 74 MMHG | BODY MASS INDEX: 20.62 KG/M2 | OXYGEN SATURATION: 97 % | HEIGHT: 70 IN

## 2021-10-05 DIAGNOSIS — Z00.00 ROUTINE GENERAL MEDICAL EXAMINATION AT A HEALTH CARE FACILITY: Primary | ICD-10-CM

## 2021-10-05 DIAGNOSIS — N52.9 ERECTILE DYSFUNCTION, UNSPECIFIED ERECTILE DYSFUNCTION TYPE: ICD-10-CM

## 2021-10-05 DIAGNOSIS — C61 PROSTATE CANCER (H): ICD-10-CM

## 2021-10-05 DIAGNOSIS — R73.03 PRE-DIABETES: ICD-10-CM

## 2021-10-05 DIAGNOSIS — Z12.5 ENCOUNTER FOR SCREENING FOR MALIGNANT NEOPLASM OF PROSTATE: ICD-10-CM

## 2021-10-05 DIAGNOSIS — R53.83 OTHER FATIGUE: ICD-10-CM

## 2021-10-05 DIAGNOSIS — F30.9 BIPOLAR I DISORDER, SINGLE MANIC EPISODE (H): ICD-10-CM

## 2021-10-05 DIAGNOSIS — G31.84 MCI (MILD COGNITIVE IMPAIRMENT): ICD-10-CM

## 2021-10-05 LAB
ANION GAP SERPL CALCULATED.3IONS-SCNC: 9 MMOL/L (ref 5–18)
BASOPHILS # BLD AUTO: 0 10E3/UL (ref 0–0.2)
BASOPHILS NFR BLD AUTO: 0 %
BUN SERPL-MCNC: 7 MG/DL (ref 8–28)
CALCIUM SERPL-MCNC: 9.4 MG/DL (ref 8.5–10.5)
CHLORIDE BLD-SCNC: 102 MMOL/L (ref 98–107)
CO2 SERPL-SCNC: 26 MMOL/L (ref 22–31)
CREAT SERPL-MCNC: 0.76 MG/DL (ref 0.7–1.3)
EOSINOPHIL # BLD AUTO: 0.1 10E3/UL (ref 0–0.7)
EOSINOPHIL NFR BLD AUTO: 2 %
ERYTHROCYTE [DISTWIDTH] IN BLOOD BY AUTOMATED COUNT: 13 % (ref 10–15)
GFR SERPL CREATININE-BSD FRML MDRD: 83 ML/MIN/1.73M2
GLUCOSE BLD-MCNC: 89 MG/DL (ref 70–125)
HBA1C MFR BLD: 5.3 % (ref 0–5.6)
HCT VFR BLD AUTO: 36.8 % (ref 40–53)
HGB BLD-MCNC: 12.7 G/DL (ref 13.3–17.7)
IMM GRANULOCYTES # BLD: 0 10E3/UL
IMM GRANULOCYTES NFR BLD: 0 %
LYMPHOCYTES # BLD AUTO: 1.6 10E3/UL (ref 0.8–5.3)
LYMPHOCYTES NFR BLD AUTO: 27 %
MCH RBC QN AUTO: 33.2 PG (ref 26.5–33)
MCHC RBC AUTO-ENTMCNC: 34.5 G/DL (ref 31.5–36.5)
MCV RBC AUTO: 96 FL (ref 78–100)
MONOCYTES # BLD AUTO: 0.5 10E3/UL (ref 0–1.3)
MONOCYTES NFR BLD AUTO: 7 %
NEUTROPHILS # BLD AUTO: 4 10E3/UL (ref 1.6–8.3)
NEUTROPHILS NFR BLD AUTO: 64 %
PLATELET # BLD AUTO: 200 10E3/UL (ref 150–450)
POTASSIUM BLD-SCNC: 4.4 MMOL/L (ref 3.5–5)
PSA SERPL-MCNC: 0.2 UG/L (ref 0–6.5)
RBC # BLD AUTO: 3.82 10E6/UL (ref 4.4–5.9)
SODIUM SERPL-SCNC: 137 MMOL/L (ref 136–145)
TSH SERPL DL<=0.005 MIU/L-ACNC: 2.01 UIU/ML (ref 0.3–5)
WBC # BLD AUTO: 6.2 10E3/UL (ref 4–11)

## 2021-10-05 PROCEDURE — 85025 COMPLETE CBC W/AUTO DIFF WBC: CPT | Performed by: FAMILY MEDICINE

## 2021-10-05 PROCEDURE — 99397 PER PM REEVAL EST PAT 65+ YR: CPT | Performed by: FAMILY MEDICINE

## 2021-10-05 PROCEDURE — 84443 ASSAY THYROID STIM HORMONE: CPT | Performed by: FAMILY MEDICINE

## 2021-10-05 PROCEDURE — 36415 COLL VENOUS BLD VENIPUNCTURE: CPT | Performed by: FAMILY MEDICINE

## 2021-10-05 PROCEDURE — G0103 PSA SCREENING: HCPCS | Performed by: FAMILY MEDICINE

## 2021-10-05 PROCEDURE — 83036 HEMOGLOBIN GLYCOSYLATED A1C: CPT | Performed by: FAMILY MEDICINE

## 2021-10-05 PROCEDURE — 80048 BASIC METABOLIC PNL TOTAL CA: CPT | Performed by: FAMILY MEDICINE

## 2021-10-05 PROCEDURE — 99213 OFFICE O/P EST LOW 20 MIN: CPT | Mod: 25 | Performed by: FAMILY MEDICINE

## 2021-10-05 RX ORDER — SILDENAFIL 100 MG/1
100 TABLET, FILM COATED ORAL DAILY PRN
Qty: 30 TABLET | Refills: 1 | Status: SHIPPED | OUTPATIENT
Start: 2021-10-05 | End: 2021-12-16

## 2021-10-05 ASSESSMENT — MIFFLIN-ST. JEOR: SCORE: 1336.49

## 2021-10-05 NOTE — PROGRESS NOTES
SUBJECTIVE:   Patrick Wharton is a 86 year old male who presents for Preventive Visit.      Patient has been advised of split billing requirements and indicates understanding: No   Are you in the first 12 months of your Medicare coverage?  No    Do you feel safe in your environment? Yes    Have you ever done Advance Care Planning? (For example, a Health Directive, POLST, or a discussion with a medical provider or your loved ones about your wishes): Yes, advance care planning is on file.    He is here today with his wife.  He has mild cognitive impairment with some progression.  He has a history of bipolar.  No indication of decompensation from a mental health standpoint.  No indication that he needs to be in a different type of living environment.  Wife notes he sleeps a lot more.  He still exercises but has curtailed his exercise somewhat.  He does express considerations that he has hopes that he does not live much longer as he does not want to continue to grow old and have further decline in his health and mentation.  He has prediabetes.  Was treated for prostate cancer, he has had a PSA reading of 0.1 then moved up to 0.2 but has been stable at that level.  We have taken a conservative approach to this and have not referred back to urology for any treatment considerations.  We addressed the same we will continue to monitor if it remains stable unlikely we will take any additional action at this point in time.    Fallen 2 or more times in the past year?: No  Any fall with injury in the past year?: No    Cognitive Screening   1) Repeat 3 items (Leader, Season, Table)item}  2) Clock draw: NORMAL  3) 3 item recall: Recalls 3 objects  Results: 3 items recalled: COGNITIVE IMPAIRMENT LESS LIKELY    Mini-CogTM Copyright SILVIA Cabello. Licensed by the author for use in [place] [employer]; reprinted with permission (tana@.[facility]). All rights reserved.        Reviewed and updated as needed this visit by clinical  "staff  Reviewed and updated as needed this visit by Provider    Social History     Tobacco Use     Smoking status: Never Smoker     Smokeless tobacco: Never Used   Substance Use Topics     Alcohol use: No       Alcohol Use 10/2/2021   Prescreen: >3 drinks/day or >7 drinks/week? Not Applicable       Current providers sharing in care for this patient include:   Patient Care Team:  Selvin Riley MD as PCP - General (Family Practice)  Ramon Rene MD as Assigned Neuroscience Provider  Selvin Riley MD as Assigned PCP  Kevin Banks MD as Assigned Surgical Provider  Jose R Brewer PA-C as Assigned Musculoskeletal Provider    The following health maintenance items are reviewed in Epic and correct as of today:  Health Maintenance Due   Topic Date Due     ANNUAL REVIEW OF HM ORDERS  Never done             Review of Systems  Complete review of systems is obtained.  Other than the specific considerations noted above complete review of systems is negative.      OBJECTIVE:   Ht 1.765 m (5' 9.5\")   Wt 65.3 kg (144 lb)   BMI 20.96 kg/m   Estimated body mass index is 20.96 kg/m  as calculated from the following:    Height as of this encounter: 1.765 m (5' 9.5\").    Weight as of this encounter: 65.3 kg (144 lb).  Physical Exam        General Appearance:    Alert, cooperative, no distress   Eyes:   No scleral icterus or conjunctival irritation       Ears:    Normal TM's and external ear canals, both ears   Throat:   Lips, mucosa, and tongue normal; teeth and gums normal   Neck:   Supple, symmetrical, trachea midline, no adenopathy;        thyroid:  No enlargement/tenderness/nodules   Lungs:     Clear to auscultation bilaterally, respirations unlabored, no wheezes or crackles   Heart:    Regular rate and rhythm,  No murmur   Abdomen:    Soft, no distention, no tenderness on palpation, no masses, no organomegaly     Extremities:  No edema, no joint swelling or redness, no evidence of any injuries   Skin:  " "No concerning skin findings, no suspicious moles, no rashes, multiple seborrheic keratoses on the trunk region   Neurologic:  On gross examination there is no motor or sensory deficit.  Patient walks with a normal gait         ASSESSMENT / PLAN:   Patrick was seen today for wellness visit, labs only and gait problem.    Diagnoses and all orders for this visit:    Routine general medical examination at a health care facility    Bipolar I disorder, single manic episode (H)    Pre-diabetes  -     Hemoglobin A1c  -     Basic metabolic panel  (Ca, Cl, CO2, Creat, Gluc, K, Na, BUN)    MCI (mild cognitive impairment)    Prostate cancer (H)  -     Prostate Specific Antigen Screen; Future  -     Prostate Specific Antigen Screen    Erectile dysfunction, unspecified erectile dysfunction type  -     sildenafil (VIAGRA) 100 MG tablet; Take 1 tablet (100 mg) by mouth daily as needed    Other fatigue  -     CBC with Platelets & Differential  -     TSH with free T4 reflex    Encounter for screening for malignant neoplasm of prostate   -     Prostate Specific Antigen Screen; Future  -     Prostate Specific Antigen Screen           Patient has been advised of split billing requirements and indicates understanding: Yes  COUNSELING:  Reviewed preventive health counseling, as reflected in patient instructions       Regular exercise       Healthy diet/nutrition    Estimated body mass index is 20.96 kg/m  as calculated from the following:    Height as of this encounter: 1.765 m (5' 9.5\").    Weight as of this encounter: 65.3 kg (144 lb).        He reports that he has never smoked. He has never used smokeless tobacco.      Appropriate preventive services were discussed with this patient, including applicable screening as appropriate for cardiovascular disease, diabetes, osteopenia/osteoporosis, and glaucoma.  As appropriate for age/gender, discussed screening for colorectal cancer, prostate cancer, breast cancer, and cervical cancer. " "Checklist reviewing preventive services available has been given to the patient.    Reviewed patients plan of care and provided an AVS. The Basic Care Plan (routine screening as documented in Health Maintenance) for Patrick meets the Care Plan requirement. This Care Plan has been established and reviewed with the Patient.    Counseling Resources:  ATP IV Guidelines  Pooled Cohorts Equation Calculator  Breast Cancer Risk Calculator  Breast Cancer: Medication to Reduce Risk  FRAX Risk Assessment  ICSI Preventive Guidelines  Dietary Guidelines for Americans, 2010  [employer]'s MyPlate  ASA Prophylaxis  Lung CA Screening    Selvin Riley MD, MD  [facility]    Identified Health Risks:  Answers for HPI/ROS submitted by the patient on 10/2/2021  In general, how would you rate your overall physical health?: good  Frequency of exercise:: 6-7 days/week  Do you usually eat at least 4 servings of fruit and vegetables a day, include whole grains & fiber, and avoid regularly eating high fat or \"junk\" foods? : No  Taking medications regularly:: No  Medication side effects:: None  Activities of Daily Living: no assistance needed  Home safety: no safety concerns identified  Hearing Impairment:: difficulty following a conversation in a noisy restaurant or crowded room  In the past 6 months, have you been bothered by leaking of urine?: No  In general, how would you rate your overall mental or emotional health?: good  Additional concerns today:: No  Duration of exercise:: 30-45 minutes      "

## 2021-12-14 DIAGNOSIS — N52.9 ERECTILE DYSFUNCTION, UNSPECIFIED ERECTILE DYSFUNCTION TYPE: ICD-10-CM

## 2021-12-16 RX ORDER — SILDENAFIL 100 MG/1
TABLET, FILM COATED ORAL
Qty: 30 TABLET | Refills: 0 | Status: SHIPPED | OUTPATIENT
Start: 2021-12-16 | End: 2022-07-11

## 2022-01-17 DIAGNOSIS — F30.9 MANIC EPISODE, UNSPECIFIED (H): ICD-10-CM

## 2022-01-19 RX ORDER — MIRTAZAPINE 30 MG/1
TABLET, FILM COATED ORAL
Qty: 90 TABLET | Refills: 2 | Status: SHIPPED | OUTPATIENT
Start: 2022-01-19 | End: 2022-02-16

## 2022-01-19 NOTE — TELEPHONE ENCOUNTER
"Outpatient Medication Detail     Disp Refills Start End DAVID   mirtazapine (REMERON) 30 MG tablet 90 tablet 3 1/17/2021  No   Sig: TAKE 1 TABLET BY MOUTH EVERYDAY AT BEDTIME   Sent to pharmacy as: mirtazapine 30 mg tablet   E-Prescribing Status: Receipt confirmed by pharmacy (1/17/2021  4:52 AM CST)       Last office visit provider:  140/5/21     Requested Prescriptions   Pending Prescriptions Disp Refills     mirtazapine (REMERON) 30 MG tablet [Pharmacy Med Name: MIRTAZAPINE 30 MG TABLET] 90 tablet 3     Sig: TAKE 1 TABLET BY MOUTH EVERYDAY AT BEDTIME       Atypical Antidepressants Protocol Passed - 1/17/2022  1:02 PM        Passed - Recent (12 mo) or future (30 days) visit within the authorizing provider's specialty     Patient has had an office visit with the authorizing provider or a provider within the authorizing providers department within the previous 12 mos or has a future within next 30 days. See \"Patient Info\" tab in inbasket, or \"Choose Columns\" in Meds & Orders section of the refill encounter.              Passed - Medication active on med list        Passed - Patient is age 18 or older             Davion Luna RN 01/19/22 1:08 PM  "

## 2022-02-16 ENCOUNTER — OFFICE VISIT (OUTPATIENT)
Dept: NEUROLOGY | Facility: CLINIC | Age: 87
End: 2022-02-16
Payer: COMMERCIAL

## 2022-02-16 VITALS
BODY MASS INDEX: 21.05 KG/M2 | SYSTOLIC BLOOD PRESSURE: 130 MMHG | WEIGHT: 147 LBS | HEIGHT: 70 IN | DIASTOLIC BLOOD PRESSURE: 78 MMHG | HEART RATE: 77 BPM

## 2022-02-16 DIAGNOSIS — G31.84 MCI (MILD COGNITIVE IMPAIRMENT): Primary | ICD-10-CM

## 2022-02-16 PROCEDURE — 99214 OFFICE O/P EST MOD 30 MIN: CPT | Performed by: PSYCHIATRY & NEUROLOGY

## 2022-02-16 NOTE — LETTER
2/16/2022         RE: Patrick Wharton  [address]  [place] [facility] [address]        Dear Colleague,    Thank you for referring your patient, Patrick Wharton, to the [facility] [place]. Please see a copy of my visit note below.    In person evaluation    HPI  January 1, 2020, in person consultation transfer of care from Dr. Vikas Mcginnis  4/27/2020, telephone visit  8/3/2020, telephone visit  11/6/2020, video visit  8/13/2021, in person visit  2/16/2022, in person visit      86-year-old followed neurologically for:  Cognitive decline (memory loss)  Onset as far back as 2015  Difficulty remembering people's names  Previously cared for by Dr. Vikas Mcginnis   history of bipolar disorder  Abnormal EEG    Since last seen  No hospitalizations  No surgeries  No major illnesses  Did stop his Remeron which he was using for sleep prescribed by other physicians says he sleeps okay    Did help with his wife she feels he is forgetful but is nothing specific that she thinks could be done  He does do walking exercise used to run but now just walks but he gets fatigued        A.  Mild cognitive impairment       Onset as far back as 2015       Trouble remembering people's names       Past abnormal EEG       Previously cared for by Dr. Vikas Mcginnis       Mother had some dementia the last year of life when she lived to be 90       Scored a 23 out of 30, (8/13/2021)       Scored a 25 out of 30, (January 2020)       Subtle change over a year and a half         We discussed cognitive decline       Exelon 3 mg 1 tab p.o. twice daily      Depakote 250 mg 1 p.o. nightly        No GERD no diarrhea no lightheadedness no black tarry stools no blood in the stool      Does have severe hearing loss at baseline      B.  Has history of bipolar disorder        Neurologic review from original visit August 2021  Patient feels that he has had a falloff in his memory has more trouble tracking and following conversations  Used to  go to a group with his retired friends from [employer] but they cannot get together due to Covid  Has severe hearing loss in both ears  Hearing doctor thought that they could maybe make a hearing aid that would work on the left ear but the not sure  With the mask on and talking to him he misses a lot of the conversation though  I am sure this is causing some difficulty with his memory recall and tracking conversations    Patient is still coherent can should chat about multiple topics  Can jump from topic to topic fairly easy  Does better when we talk about more things from the past  Scored a 23 out of 30, (8/13/2021)  Scored a 25 out of 30, (January 2020)  Subtle change over a year and a half        Neurologic summary:  Previously followed by Dr. Vikas Mcginnis  Mild cognitive impairment as far back as 2015  Patient works for [employer] as a   Retired in 1998  [employer] testing January 2020, 25 out of 30  Seems to have difficulty remembering people's names  Has significant hearing loss right greater than left left is not enough to be helped by hearing aid  Past history of bipolar disorder  Mother with dementia in the last year of her life at age 90  Uncle last year of life age 88 developed dementia  In the distant past of been on Depakote for 2 to 3 years for bipolar disorder  Has an abnormal EEG  Patient restarted on Depakote 2020  Talked about the pros and cons of using this to maybe stabilize the temporal lobe see if his symptoms are little less severe may be slow down the progression  His daughter has depression and is on Depakote    Patient tends to be quite active rows or exercises on the exercise bike regularly  Watches his diet hemoglobin A1c's have been good  Tries to stay active volunteers and participates in activities      Past medical history  Mild cognitive impairment  Bipolar disorder  Prostate cancer  Benign prostatic hypertrophy  Seborrheic dermatitis  Osteoarthritis with shoulder surgery  Left leg edema    Habits    Non-smoker  Does not drink alcohol  Worked as a [employer]  retired in   Lives in seniors apartment with elevator  Does exercise regularly stays active      Family history  Mother had glaucoma congestive heart failure dementia  about age 90  Father  at 34 with testicular cancer  He has 1 sister with some diabetes  Daughter with depression treated with Depakote  Uncle with difficulty with memory at the age of 88 last year of his life      Work-up review  MRI scan brain 2016 mild to moderate atrophy progressed mildly from   Laboratory data 2019  Sodium 140 potassium 4.9 BUN 8 creatinine 0.8 glucose 94  White blood count 6.3 hemoglobin 13.2 platelets 313,000  Hemoglobin A1c 5.8%  Outside records from Dr. Mcginnis 2018, 2018 reviewed  EEG 2020 rare F7/T3 sharp waves with RAINE activity with hyperventilation mild to moderately abnormal  B12 689  TSH 1.57  [employer]  2020,    25 out of 30   2021,   23 out of 30           review of systems    Chronic hearing loss seems to be worse than last time  Patient wearing the mask so it is difficult to communicate as he cannot read her lips    No headache no chest pain or shortness of breath no nausea vomiting no diarrhea no fever chills no abdominal pain no cough no sore throat    No diplopia dysarthria dysphagia  No focal weakness  No tremor  Gait is good    Has terrible hearing     Has impotence due to prostate hypertrophy and history of prostate cancer    Otherwise review systems negative    General exam  Alert oriented x3   blood pressure 130/78, pulse 77, temperature 98.0  HEENT significant for hearing loss in left ear and right ear poor hearing and has a hearing aid  Lungs clear  Abdomen soft  No edema the feet  Patient fan only eats 1 meal per day    Neurologic exam  Alert orient x3  Normal prosody speech  Normal naming  Normal comprehension  Normal repetition  No aphasia  No neglect  Previous Winter 25 out of 30  January 2020   [employer] 23 out of 30    Cranial 2 through 12 are significant for  Deaf in left ear significant hearing loss of the other  No ophthalmoplegia  No nystagmus  Face symmetrical  Tongue twisters okay  Visual fields intact    Upper extremities  No drift no tremor normal finger-nose    Lower extremities  Distal proximal strength good    Gait  Sits up in the chair walks out to the sink and back was a little bit antalgic needs to be somewhat careful    Walks a mile and a quarter every day without difficulty    Recommend that he uses a walking stick for his walks for safety        Assessment/Plan     1.  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)        Mild cognitive impairment      Onset as far back as 2015       Family history of dementia in the later years mom and uncle       History of bipolar disorder       Abnormal EEG F7/T3 sharp wave         Depakote 250 mg 1 tablet nightly       Exelon 3 mg capsule twice daily (added 8/13/2021)    Tolerating medication  Follow-up in 6 months  Discussed the pros and cons of trying a medication to help prevent further progression of difficulties   discussed with patient and wife    Patient will call sooner if there is any problems      We talked about different approaches and whether he should start some medication  Eats 1 meal a day discussed important to have good nutrition  Does exercise though regularly and stays active   We could try a low-dose of Exelon 3 mg twice daily  If he gets any GI distress we would stop it  Continue with the Depakote low-dose      Symptoms and signs seem relatively stable  No side effect of medication  Discussed the above also with patient and wife    Total care time today 33 minutes  Follow-up in 6 months        Again, thank you for allowing me to participate in the care of your patient.        Sincerely,        Ramon Rene MD

## 2022-02-16 NOTE — PROGRESS NOTES
In person evaluation    HPI  January 1, 2020, in person consultation transfer of care from Dr. Vikas Mcginnis  4/27/2020, telephone visit  8/3/2020, telephone visit  11/6/2020, video visit  8/13/2021, in person visit  2/16/2022, in person visit      86-year-old followed neurologically for:  Cognitive decline (memory loss)  Onset as far back as 2015  Difficulty remembering people's names  Previously cared for by Dr. Vikas Mcginnis   history of bipolar disorder  Abnormal EEG    Since last seen  No hospitalizations  No surgeries  No major illnesses  Did stop his Remeron which he was using for sleep prescribed by other physicians says he sleeps okay    Did help with his wife she feels he is forgetful but is nothing specific that she thinks could be done  He does do walking exercise used to run but now just walks but he gets fatigued        A.  Mild cognitive impairment       Onset as far back as 2015       Trouble remembering people's names       Past abnormal EEG       Previously cared for by Dr. Vikas Mcginnis       Mother had some dementia the last year of life when she lived to be 90       Scored a 23 out of 30, (8/13/2021)       Scored a 25 out of 30, (January 2020)       Subtle change over a year and a half         We discussed cognitive decline       Exelon 3 mg 1 tab p.o. twice daily      Depakote 250 mg 1 p.o. nightly        No GERD no diarrhea no lightheadedness no black tarry stools no blood in the stool      Does have severe hearing loss at baseline      B.  Has history of bipolar disorder        Neurologic review from original visit August 2021  Patient feels that he has had a falloff in his memory has more trouble tracking and following conversations  Used to go to a group with his retired friends from [employer] but they cannot get together due to Covid  Has severe hearing loss in both ears  Hearing doctor thought that they could maybe make a hearing aid that would work on the left ear but the not sure  With the mask on and  talking to him he misses a lot of the conversation though  I am sure this is causing some difficulty with his memory recall and tracking conversations    Patient is still coherent can should chat about multiple topics  Can jump from topic to topic fairly easy  Does better when we talk about more things from the past  Scored a 23 out of 30, (2021)  Scored a 25 out of 30, (2020)  Subtle change over a year and a half        Neurologic summary:  Previously followed by Dr. Vikas Mcginnis  Mild cognitive impairment as far back as   Patient works for [employer] as a   Retired in   [employer] testing 2020, 25 out of 30  Seems to have difficulty remembering people's names  Has significant hearing loss right greater than left left is not enough to be helped by hearing aid  Past history of bipolar disorder  Mother with dementia in the last year of her life at age 90  Uncle last year of life age 88 developed dementia  In the distant past of been on Depakote for 2 to 3 years for bipolar disorder  Has an abnormal EEG  Patient restarted on Depakote   Talked about the pros and cons of using this to maybe stabilize the temporal lobe see if his symptoms are little less severe may be slow down the progression  His daughter has depression and is on Depakote    Patient tends to be quite active rows or exercises on the exercise bike regularly  Watches his diet hemoglobin A1c's have been good  Tries to stay active volunteers and participates in activities      Past medical history  Mild cognitive impairment  Bipolar disorder  Prostate cancer  Benign prostatic hypertrophy  Seborrheic dermatitis  Osteoarthritis with shoulder surgery  Left leg edema    Habits   Non-smoker  Does not drink alcohol  Worked as a [employer]  retired in   Lives in seniors apartment with elevator  Does exercise regularly stays active      Family history  Mother had glaucoma congestive heart failure dementia  about age 90  Father  at  34 with testicular cancer  He has 1 sister with some diabetes  Daughter with depression treated with Depakote  Uncle with difficulty with memory at the age of 88 last year of his life      Work-up review  MRI scan brain December 2016 mild to moderate atrophy progressed mildly from 2010  Laboratory data December 2019  Sodium 140 potassium 4.9 BUN 8 creatinine 0.8 glucose 94  White blood count 6.3 hemoglobin 13.2 platelets 313,000  Hemoglobin A1c 5.8%  Outside records from Dr. Mcginnis January 2018, January 2018 reviewed  EEG January 21, 2020 rare F7/T3 sharp waves with RAINE activity with hyperventilation mild to moderately abnormal  B12 689  TSH 1.57  [facility]  1/20/2020,    25 out of 30   8/13/2021,   23 out of 30           review of systems    Chronic hearing loss seems to be worse than last time  Patient wearing the mask so it is difficult to communicate as he cannot read her lips    No headache no chest pain or shortness of breath no nausea vomiting no diarrhea no fever chills no abdominal pain no cough no sore throat    No diplopia dysarthria dysphagia  No focal weakness  No tremor  Gait is good    Has terrible hearing     Has impotence due to prostate hypertrophy and history of prostate cancer    Otherwise review systems negative    General exam  Alert oriented x3   blood pressure 130/78, pulse 77, temperature 98.0  HEENT significant for hearing loss in left ear and right ear poor hearing and has a hearing aid  Lungs clear  Abdomen soft  No edema the feet  Patient fan only eats 1 meal per day    Neurologic exam  Alert orient x3  Normal prosody speech  Normal naming  Normal comprehension  Normal repetition  No aphasia  No neglect  Previous [place] 25 out of 30 January 2020   [facility] 23 out of 30    Cranial 2 through 12 are significant for  Deaf in left ear significant hearing loss of the other  No ophthalmoplegia  No nystagmus  Face symmetrical  Tongue twisters okay  Visual fields intact    Upper extremities  No drift no  tremor normal finger-nose    Lower extremities  Distal proximal strength good    Gait  Sits up in the chair walks out to the sink and back was a little bit antalgic needs to be somewhat careful    Walks a mile and a quarter every day without difficulty    Recommend that he uses a walking stick for his walks for safety        Assessment/Plan     1.  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)        Mild cognitive impairment      Onset as far back as 2015       Family history of dementia in the later years mom and uncle       History of bipolar disorder       Abnormal EEG F7/T3 sharp wave         Depakote 250 mg 1 tablet nightly       Exelon 3 mg capsule twice daily (added 8/13/2021)    Tolerating medication  Follow-up in 6 months  Discussed the pros and cons of trying a medication to help prevent further progression of difficulties   discussed with patient and wife    Patient will call sooner if there is any problems      We talked about different approaches and whether he should start some medication  Eats 1 meal a day discussed important to have good nutrition  Does exercise though regularly and stays active   We could try a low-dose of Exelon 3 mg twice daily  If he gets any GI distress we would stop it  Continue with the Depakote low-dose      Symptoms and signs seem relatively stable  No side effect of medication  Discussed the above also with patient and wife    Total care time today 33 minutes  Follow-up in 6 months

## 2022-02-16 NOTE — NURSING NOTE
Chief Complaint   Patient presents with     Memory Loss     6 month follow up.      Rolanda Lomeli LPN on 2/16/2022 at 12:00 PM

## 2022-02-17 ENCOUNTER — TRANSFERRED RECORDS (OUTPATIENT)
Dept: HEALTH INFORMATION MANAGEMENT | Facility: CLINIC | Age: 87
End: 2022-02-17
Payer: COMMERCIAL

## 2022-03-21 ENCOUNTER — TRANSFERRED RECORDS (OUTPATIENT)
Dept: HEALTH INFORMATION MANAGEMENT | Facility: CLINIC | Age: 87
End: 2022-03-21
Payer: COMMERCIAL

## 2022-04-05 ENCOUNTER — OFFICE VISIT (OUTPATIENT)
Dept: FAMILY MEDICINE | Facility: CLINIC | Age: 87
End: 2022-04-05
Payer: COMMERCIAL

## 2022-04-05 VITALS
RESPIRATION RATE: 18 BRPM | HEART RATE: 73 BPM | SYSTOLIC BLOOD PRESSURE: 130 MMHG | DIASTOLIC BLOOD PRESSURE: 74 MMHG | BODY MASS INDEX: 21 KG/M2 | OXYGEN SATURATION: 98 % | WEIGHT: 146.7 LBS | HEIGHT: 70 IN

## 2022-04-05 DIAGNOSIS — F30.9 BIPOLAR I DISORDER, SINGLE MANIC EPISODE (H): ICD-10-CM

## 2022-04-05 DIAGNOSIS — G31.84 MCI (MILD COGNITIVE IMPAIRMENT): ICD-10-CM

## 2022-04-05 DIAGNOSIS — D64.9 ANEMIA, UNSPECIFIED TYPE: ICD-10-CM

## 2022-04-05 DIAGNOSIS — Z01.818 PREOP GENERAL PHYSICAL EXAM: Primary | ICD-10-CM

## 2022-04-05 DIAGNOSIS — M19.011 PRIMARY OSTEOARTHRITIS OF RIGHT SHOULDER: ICD-10-CM

## 2022-04-05 PROBLEM — C61 PROSTATE CANCER (H): Status: RESOLVED | Noted: 2020-07-14 | Resolved: 2022-04-05

## 2022-04-05 LAB
ATRIAL RATE - MUSE: 70 BPM
DIASTOLIC BLOOD PRESSURE - MUSE: NORMAL MMHG
ERYTHROCYTE [DISTWIDTH] IN BLOOD BY AUTOMATED COUNT: 13.2 % (ref 10–15)
HCT VFR BLD AUTO: 38.4 % (ref 40–53)
HGB BLD-MCNC: 13.6 G/DL (ref 13.3–17.7)
INTERPRETATION ECG - MUSE: NORMAL
MCH RBC QN AUTO: 32.4 PG (ref 26.5–33)
MCHC RBC AUTO-ENTMCNC: 35.4 G/DL (ref 31.5–36.5)
MCV RBC AUTO: 91 FL (ref 78–100)
P AXIS - MUSE: 63 DEGREES
PLATELET # BLD AUTO: 195 10E3/UL (ref 150–450)
PR INTERVAL - MUSE: 162 MS
QRS DURATION - MUSE: 86 MS
QT - MUSE: 390 MS
QTC - MUSE: 421 MS
R AXIS - MUSE: 2 DEGREES
RBC # BLD AUTO: 4.2 10E6/UL (ref 4.4–5.9)
SYSTOLIC BLOOD PRESSURE - MUSE: NORMAL MMHG
T AXIS - MUSE: 32 DEGREES
VENTRICULAR RATE- MUSE: 70 BPM
WBC # BLD AUTO: 5.5 10E3/UL (ref 4–11)

## 2022-04-05 PROCEDURE — 99214 OFFICE O/P EST MOD 30 MIN: CPT | Performed by: FAMILY MEDICINE

## 2022-04-05 PROCEDURE — 93010 ELECTROCARDIOGRAM REPORT: CPT | Performed by: INTERNAL MEDICINE

## 2022-04-05 PROCEDURE — 36415 COLL VENOUS BLD VENIPUNCTURE: CPT | Performed by: FAMILY MEDICINE

## 2022-04-05 PROCEDURE — 85027 COMPLETE CBC AUTOMATED: CPT | Performed by: FAMILY MEDICINE

## 2022-04-05 PROCEDURE — 93005 ELECTROCARDIOGRAM TRACING: CPT | Performed by: FAMILY MEDICINE

## 2022-04-05 NOTE — PROGRESS NOTES
[facility]  [address] [facility] [address]  [facility] [address]  Phone: [phone]  Fax: [phone]  Primary Provider: Gisele Calloway  Pre-op Performing Provider: GISELE CALLWOAY      PREOPERATIVE EVALUATION:  Today's date: 4/5/2022    Patrick Wharton is a 86 year old male who presents for a preoperative evaluation.    Surgical Information:  Surgery/Procedure: right shoulder surgery, total shoulder arthroplasty  Surgery Location: [facility]  Surgeon: Dr Mckeon  Surgery Date: 4/19/22  Time of Surgery: tbd  Where patient plans to recover: At home with family  Fax number for surgical facility:     Type of Anesthesia Anticipated: to be determined    Assessment & Plan     The proposed surgical procedure is considered INTERMEDIATE risk.  Patrick was seen today for pre-op exam, forms, recheck, musculoskeletal problem and forms.    Diagnoses and all orders for this visit:    Preop general physical exam  -     EKG 12-lead, tracing only  -     CBC with platelets    Primary osteoarthritis of right shoulder    Bipolar I disorder, single manic episode (H)    MCI (mild cognitive impairment)    Anemia, unspecified type       Risks and Recommendations:  The patient has the following additional risks and recommendations for perioperative complications:   - No identified additional risk factors other than previously addressed    Medication Instructions:  Patient is to take all scheduled medications on the day of surgery EXCEPT for modifications listed below:    RECOMMENDATION:  APPROVAL GIVEN to proceed with proposed procedure, without further diagnostic evaluation.      Subjective     HPI related to upcoming procedure:     He has significant right shoulder osteoarthritis and has failed conservative means for treatment and is now scheduled for total arthroplasty.    He has a history of bipolar disorder.  There is no sign that there is any decompensation.  He remains on medical treatment.    He has mild  cognitive impairment.  He is under the care of a neurologist.  No significant concerns related to this diagnosis is identified at this time.    He has chronic anemia.  Hemoglobin typically in the 12-14 range.  No history of blood loss.  Does not require further work-up before the intended procedure as it has been stable over greater than a 3-year duration.    He has no history of heart disease or lung disease.  He reports good activity tolerance without shortness of breath or chest pain.    No signs or symptoms of an acute illness.    No prior problems with anesthesia specifically or surgical procedures.  Reports 1 time in the past having a difficult intubation that resulted in damage to dentition, reports 2 subsequent occasions of intubation without any issue.  No history of a blood clot or bleeding disorder.    Preop Questions 4/2/2022   1. Have you ever had a heart attack or stroke? No   2. Have you ever had surgery on your heart or blood vessels, such as a stent placement, a coronary artery bypass, or surgery on an artery in your head, neck, heart, or legs? No   3. Do you have chest pain with activity? No   4. Do you have a history of  heart failure? No   5. Do you currently have a cold, bronchitis or symptoms of other infection? No   6. Do you have a cough, shortness of breath, or wheezing? No   7. Do you or anyone in your family have previous history of blood clots? No   8. Do you or does anyone in your family have a serious bleeding problem such as prolonged bleeding following surgeries or cuts? No   9. Have you ever had problems with anemia or been told to take iron pills? No   10. Have you had any abnormal blood loss such as black, tarry or bloody stools? No   11. Have you ever had a blood transfusion? No   12. Are you willing to have a blood transfusion if it is medically needed before, during, or after your surgery? Yes   13. Have you or any of your relatives ever had problems with anesthesia? No   14. Do  you have sleep apnea, excessive snoring or daytime drowsiness? No   15. Do you have any artifical heart valves or other implanted medical devices like a pacemaker, defibrillator, or continuous glucose monitor? No   16. Do you have artificial joints? YES - left shoulder    17. Are you allergic to latex? No     Status of Chronic Conditions:  See problem list for active medical problems.  Problems all longstanding and stable, except as noted/documented.  See ROS for pertinent symptoms related to these conditions.      Review of Systems  Complete review of systems is obtained.  Other than the specific considerations noted above complete review of systems is negative.  Sofar have to reliably 40-minute no-show  Family History   Problem Relation Age of Onset     Heart Disease Mother      Alzheimer Disease Mother      Heart Failure Mother      Cancer Father        Patient Active Problem List    Diagnosis Date Noted     Non-recurrent unilateral inguinal hernia without obstruction or gangrene 12/03/2020     Priority: Medium     Formatting of this note might be different from the original.  Added automatically from request for surgery [id number]       Anemia 07/14/2020     Priority: Medium     Created by Conversion       Benign prostatic hyperplasia 07/14/2020     Priority: Medium     Created by Conversion       Bipolar I disorder, single manic episode (H) 07/14/2020     Priority: Medium     Created by Conversion    Replacement Utility updated for latest IMO load       Family history of malignant neoplasm of prostate 07/14/2020     Priority: Medium     Created by Conversion       Impaired fasting glucose 07/14/2020     Priority: Medium     Created by Conversion       Memory loss 07/14/2020     Priority: Medium     Created by Conversion       Mild cognitive disorder 07/14/2020     Priority: Medium     Leg edema, left 05/15/2019     Priority: Medium     Pneumonia due to infectious organism, unspecified laterality, unspecified part  of lung 05/15/2019     Priority: Medium     Seborrheic keratoses 05/15/2019     Priority: Medium     Slow transit constipation 05/15/2019     Priority: Medium     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     Priority: Medium     Osteoarthritis of left shoulder, unspecified osteoarthritis type 12/05/2018     Priority: Medium     Pre-diabetes 12/05/2018     Priority: Medium     Routine general medical examination at a health care facility 12/05/2018     Priority: Medium     MCI (mild cognitive impairment) 11/24/2017     Priority: Medium     Foot swelling 06/03/2016     Priority: Medium      Past Medical History:   Diagnosis Date     Anemia      Anxiety      Arthritis      Bipolar 1 disorder (H)      BPH (benign prostatic hyperplasia)      Depression      History of anesthesia complications     difficult intubation  cant open mouth wide .told should have medical alert bracelet     [place] (hard of hearing)      MCI (mild cognitive impairment)      Prostate CA (H)      Past Surgical History:   Procedure Laterality Date     BACK SURGERY       BACK SURGERY       HC REPAIR ING HERNIA,5+Y/O,REDUCIBL Left 12/23/2020    Procedure: HERNIORRHAPHY, INGUINAL, OPEN;  Surgeon: Kevin Banks MD;  Location: [facility] OR;  Service: General     PROSTATE SURGERY       PROSTATE SURGERY       [facility] RECONSTR TOTAL SHOULDER IMPLANT Left 3/15/2019    Procedure: LEFT TOTAL SHOULDER ARTHROPLASTY;  Surgeon: Emiliano Monet MD;  Location: [facility] OR;  Service: Orthopedics     Current Outpatient Medications   Medication Sig Dispense Refill     divalproex sodium delayed-release (DEPAKOTE) 250 MG DR tablet Take 1 tablet (250 mg) by mouth daily 90 tablet 3     ferrous sulfate (FEROSUL) 325 (65 Fe) MG tablet Take 1 tablet by mouth       multivitamin w/minerals (MULTI-VITAMIN) tablet Take 1 tablet by mouth       Psyllium (METAMUCIL FIBER) 51.7 % PACK Take 1 packet by mouth       rivastigmine (EXELON) 3 MG capsule TAKE 1  "CAPSULE (3 MG) BY MOUTH 2 TIMES DAILY 60 capsule 11     sildenafil (VIAGRA) 100 MG tablet TAKE 1 TABLET BY MOUTH ONCE DAILY AS NEEDED 30 tablet 0       No Known Allergies     Social History     Tobacco Use     Smoking status: Never Smoker     Smokeless tobacco: Never Used   Substance Use Topics     Alcohol use: No     Family History   Problem Relation Age of Onset     Heart Disease Mother      Alzheimer Disease Mother      Heart Failure Mother      Cancer Father      History   Drug Use No         Objective     /74   Pulse 73   Resp 18   Ht 1.765 m (5' 9.5\")   Wt 66.5 kg (146 lb 11.2 oz)   SpO2 98%   BMI 21.35 kg/m      Physical Exam        General Appearance:    Alert, cooperative, no distress   Eyes:   No scleral icterus or conjunctival irritation       Ears:    Normal TM's and external ear canals, both ears   Throat:   Lips, mucosa, and tongue normal; teeth and gums normal   Neck:   Supple, symmetrical, trachea midline, no adenopathy;        thyroid:  No enlargement/tenderness/nodules   Lungs:     Clear to auscultation bilaterally, respirations unlabored, no wheezes or crackles   Heart:    Regular rate and rhythm,  No murmur   Abdomen:    Soft, no distention, no tenderness on palpation, no masses, no organomegaly     Extremities:  No edema, no joint swelling or redness, no evidence of any injuries   Skin:  No concerning skin findings, no suspicious moles, no rashes   Neurologic:  On gross examination there is no motor or sensory deficit.  Patient walks with a normal gait     Wt Readings from Last 3 Encounters:   04/05/22 66.5 kg (146 lb 11.2 oz)   02/16/22 66.7 kg (147 lb)   10/05/21 65.3 kg (144 lb)        BP Readings from Last 6 Encounters:   04/05/22 130/74   02/16/22 130/78   10/05/21 130/74   08/13/21 112/71   12/18/20 120/68   11/30/20 98/58        Hemoglobin A1C   Date Value Ref Range Status   10/05/2021 5.3 0.0 - 5.6 % Final     Comment:     Normal <5.7%   Prediabetes 5.7-6.4%    Diabetes 6.5% " or higher     Note: Adopted from ADA consensus guidelines.   12/18/2020 5.5 <=5.6 % Final   12/16/2019 5.8 3.5 - 6.0 % Final        Recent Labs   Lab Test 10/05/21  1622 12/18/20  0940   HGB 12.7* 13.2*    220    141   POTASSIUM 4.4 5.1*   CR 0.76 0.79   A1C 5.3 5.5        Diagnostics:  Labs pending at this time.  Results will be reviewed when available.   Recent Results (from the past 24 hour(s))   EKG 12-lead, tracing only    Collection Time: 04/05/22 11:46 AM   Result Value Ref Range    Systolic Blood Pressure  mmHg    Diastolic Blood Pressure  mmHg    Ventricular Rate 70 BPM    Atrial Rate 70 BPM    LA Interval 162 ms    QRS Duration 86 ms     ms    QTc 421 ms    P Axis 63 degrees    R AXIS 2 degrees    T Axis 32 degrees    Interpretation ECG       Sinus rhythm  Normal ECG  When compared with ECG of 18-DEC-2020 09:54,  No significant change was found     CBC with platelets    Collection Time: 04/05/22 11:55 AM   Result Value Ref Range    WBC Count 5.5 4.0 - 11.0 10e3/uL    RBC Count 4.20 (L) 4.40 - 5.90 10e6/uL    Hemoglobin 13.6 13.3 - 17.7 g/dL    Hematocrit 38.4 (L) 40.0 - 53.0 %    MCV 91 78 - 100 fL    MCH 32.4 26.5 - 33.0 pg    MCHC 35.4 31.5 - 36.5 g/dL    RDW 13.2 10.0 - 15.0 %    Platelet Count 195 150 - 450 10e3/uL       Revised Cardiac Risk Index (RCRI):  The patient has the following serious cardiovascular risks for perioperative complications:   - No serious cardiac risks = 0 points     RCRI Interpretation: 0 points: Class I (very low risk - 0.4% complication rate)           Signed Electronically by: Selvin Riley MD, MD  Copy of this evaluation report is provided to requesting physician.

## 2022-05-03 ENCOUNTER — TRANSFERRED RECORDS (OUTPATIENT)
Dept: HEALTH INFORMATION MANAGEMENT | Facility: CLINIC | Age: 87
End: 2022-05-03
Payer: COMMERCIAL

## 2022-06-06 ENCOUNTER — TRANSFERRED RECORDS (OUTPATIENT)
Dept: HEALTH INFORMATION MANAGEMENT | Facility: CLINIC | Age: 87
End: 2022-06-06
Payer: COMMERCIAL

## 2022-06-08 ENCOUNTER — TELEPHONE (OUTPATIENT)
Dept: FAMILY MEDICINE | Facility: CLINIC | Age: 87
End: 2022-06-08
Payer: COMMERCIAL

## 2022-06-08 DIAGNOSIS — F30.9 MANIC EPISODE, UNSPECIFIED (H): ICD-10-CM

## 2022-06-08 RX ORDER — MIRTAZAPINE 30 MG/1
30 TABLET, FILM COATED ORAL AT BEDTIME
Qty: 90 TABLET | Refills: 2 | Status: SHIPPED | OUTPATIENT
Start: 2022-06-08 | End: 2022-07-11

## 2022-06-08 NOTE — TELEPHONE ENCOUNTER
It appears it was taken off of list because he said he was not taking.  Since he has been taking will continue - he needs to let neurologist know he is taking this medication at his next visit with him.

## 2022-06-08 NOTE — TELEPHONE ENCOUNTER
Patient is calling to request Mirtazapine 30MG tabs. Medication is currently not active in the patient's chart. Medication discontinued on 2/16/2022 by Neurologist.     Please advise.

## 2022-07-11 ENCOUNTER — OFFICE VISIT (OUTPATIENT)
Dept: FAMILY MEDICINE | Facility: CLINIC | Age: 87
End: 2022-07-11
Payer: COMMERCIAL

## 2022-07-11 ENCOUNTER — NURSE TRIAGE (OUTPATIENT)
Dept: NURSING | Facility: CLINIC | Age: 87
End: 2022-07-11

## 2022-07-11 VITALS
HEART RATE: 74 BPM | WEIGHT: 138.6 LBS | DIASTOLIC BLOOD PRESSURE: 64 MMHG | OXYGEN SATURATION: 96 % | BODY MASS INDEX: 20.17 KG/M2 | TEMPERATURE: 98.5 F | SYSTOLIC BLOOD PRESSURE: 100 MMHG | RESPIRATION RATE: 20 BRPM

## 2022-07-11 DIAGNOSIS — R49.0 HOARSENESS: Primary | ICD-10-CM

## 2022-07-11 PROCEDURE — 99213 OFFICE O/P EST LOW 20 MIN: CPT | Performed by: STUDENT IN AN ORGANIZED HEALTH CARE EDUCATION/TRAINING PROGRAM

## 2022-07-11 RX ORDER — DIVALPROEX SODIUM 250 MG/1
TABLET, EXTENDED RELEASE ORAL
COMMUNITY
End: 2022-10-28

## 2022-07-11 RX ORDER — RIVASTIGMINE TARTRATE 3 MG/1
3 CAPSULE ORAL
COMMUNITY
End: 2022-08-19

## 2022-07-11 RX ORDER — MIRTAZAPINE 30 MG/1
30 TABLET, FILM COATED ORAL
COMMUNITY
End: 2022-08-15

## 2022-07-11 RX ORDER — AMOXICILLIN 250 MG
1 CAPSULE ORAL
COMMUNITY
Start: 2022-04-20 | End: 2022-10-28

## 2022-07-11 NOTE — PROGRESS NOTES
SUBJECTIVE:  Patrick Wharton is an 87 year old male who presents for     Change in voice:  - this AM, right when woke   - didn't happen when eating  - can swallow water/food without issue/pain  - no fever, body aches, fatigue, SOB, throat pain/swelling  - wife says he coughed a little yesterday   - no hx allergies  - taking meds    PMH:   has a past medical history of Anemia, Anxiety, Arthritis, Bipolar 1 disorder (H), BPH (benign prostatic hyperplasia), Depression, History of anesthesia complications, [place] (hard of hearing), MCI (mild cognitive impairment), and Prostate CA (H).  Patient Active Problem List   Diagnosis     Anemia     Benign prostatic hyperplasia     Bipolar I disorder, single manic episode (H)     Family history of malignant neoplasm of prostate     Foot swelling     Impaired fasting glucose     Leg edema, left     MCI (mild cognitive impairment)     Memory loss     Mild cognitive disorder     Osteoarthritis of left shoulder, unspecified osteoarthritis type     Pneumonia due to infectious organism, unspecified laterality, unspecified part of lung     Pre-diabetes     Routine general medical examination at a health care facility     Seborrheic keratoses     Slow transit constipation     Status post total replacement of left shoulder     Non-recurrent unilateral inguinal hernia without obstruction or gangrene     Social History     Socioeconomic History     Marital status:      Spouse name: None     Number of children: None     Years of education: None     Highest education level: None   Tobacco Use     Smoking status: Never Smoker     Smokeless tobacco: Never Used   Substance and Sexual Activity     Alcohol use: No     Drug use: No   Other Topics Concern     Parent/sibling w/ CABG, MI or angioplasty before 65F 55M? No     Family History   Problem Relation Age of Onset     Heart Disease Mother      Alzheimer Disease Mother      Heart Failure Mother      Cancer Father        ALLERGIES:  Patient has  no known allergies.    Current Outpatient Medications   Medication     divalproex sodium delayed-release (DEPAKOTE) 250 MG DR tablet     divalproex sodium extended-release (DEPAKOTE ER) 250 MG 24 hr tablet     ferrous sulfate (FEROSUL) 325 (65 Fe) MG tablet     mirtazapine (REMERON) 30 MG tablet     multivitamin w/minerals (THERA-VIT-M) tablet     Psyllium (METAMUCIL FIBER) 51.7 % PACK     rivastigmine (EXELON) 3 MG capsule     senna-docusate (SENOKOT-S/PERICOLACE) 8.6-50 MG tablet     No current facility-administered medications for this visit.         ROS:  ROS is done and is negative for general/constitutional, eye, ENT, Respiratory, cardiovascular, GI, , Skin, musculoskeletal except as noted elsewhere.  All other review of systems negative except as noted elsewhere.    OBJECTIVE:  /64 (BP Location: Left arm, Patient Position: Sitting, Cuff Size: Adult Regular)   Pulse 74   Temp 98.5  F (36.9  C) (Oral)   Resp 20   Wt 62.9 kg (138 lb 9.6 oz)   SpO2 96%   BMI 20.17 kg/m    GENERAL APPEARANCE: Alert, in no acute distress.  EYES: Conjunctivae clear.  NOSE: Normal, no drainage.  OROPHARYNX: MMM.  non erythamatous.  no exudate.  NECK: slight left thyroid enlarged, but not tender. Supple, no adenopathy.  RESP: Clear to auscultation bilaterally, no wheezing or retractions,no increased effort.  CV: Regular rate and rhythm, no murmurs, good capillary refill.  SKIN: No ulcers, lesions or rash.  MUSCULOSKELETAL: No gross deformities.  NEURO: No gross deficits, CN 2-12 grossly intact.    RESULTS  No results found for any visits on 07/11/22.  No results found for this or any previous visit (from the past 48 hour(s)).    ASSESSMENT/PLAN:    Hoarseness of Voice:  Began after waking up this AM. Was at [employer] yesterday and did normal amount of singing. No shouting or excessive use of voice recently. Maybe a little cough (per wife) yesterday, but patient denies. No issues eating/drinking. No pain. Is taking his meds,  which include rivastigmine, but that's for dementia per chart review. No e/o infection. Does minimal swelling of left thyroid on visualization but not TTP on palpation and not really appreciated difference on palpation. No issues breathing, etc. Voice crackly. Don't believe this to be anything acutely concerning, but recommended follow-up with PCP within a couple weeks, or sooner if worsens.  - Guidance/precautions given  - Follow-up PCP 1-2 weeks    PPE worn: Yes    Options for treatment and follow-up care were reviewed with the patient and/or guardian. Patrick Wharton and/or guardian engaged in the decision making process and verbalized understanding of the options discussed and agreed with the final plan.    See [facility] for orders, medications, letters, patient instructions    Braden Davis DO, MBA

## 2022-07-11 NOTE — TELEPHONE ENCOUNTER
Lost his voice today. I connected with scheduling for an appointment within the next three days. He will call back if he develops any difficulty breathing, swallowing or something else develops.   Kandi Macedo RN  [facility]      Reason for Disposition    Patient wants to be seen    Additional Information    Negative: Severe difficulty breathing (e.g., struggling for each breath, speaks in single words)    Negative: Bluish (or gray) lips or face now    Negative: Stridor with difficulty breathing    Negative: Started suddenly after sting from bee, wasp, or yellow jacket    Negative: Started suddenly after taking a medicine or allergic food (e.g., nuts, seafood)    Negative: Started suddenly along with widespread hives    Negative: Can't swallow normal secretions (e.g., drooling or spitting)    Negative: Tongue or facial swelling    Negative: Sounds like a life-threatening emergency to the triager    Negative: Nasal allergies also present and they are acting up    Negative: Cold symptoms are main concern    Negative: Sore throat is main symptom    Negative: Resolved choking episode and hoarseness lasts > 30 minutes    Negative: Direct blow to front of neck    Negative: Hoarseness starting in past 24 hours AND taking an ACE Inhibitor medication (e.g., benazepril/LOTENSIN, captopril/CAPOTEN, enalapril/VASOTEC, lisinopril/ZESTRIL)    Negative: Difficulty breathing    Negative: Patient sounds very sick or weak to the triager    Negative: Fever > 103 F (39.4 C)    Negative: SEVERE sore throat pain    Negative: Fever present > 3 days (72 hours)    Negative: Hoarseness starting > 24 hours ago AND taking an ACE Inhibitor medication (e.g., benazepril/LOTENSIN, captopril/CAPOTEN, enalapril/VASOTEC, lisinopril/ZESTRIL)    Negative: Hoarseness persists > 2 weeks    Protocols used: [id number]

## 2022-08-15 DIAGNOSIS — F30.9 BIPOLAR I DISORDER, SINGLE MANIC EPISODE (H): Primary | ICD-10-CM

## 2022-08-15 DIAGNOSIS — G31.84 MCI (MILD COGNITIVE IMPAIRMENT): ICD-10-CM

## 2022-08-15 RX ORDER — DIVALPROEX SODIUM 250 MG/1
TABLET, DELAYED RELEASE ORAL
Qty: 90 TABLET | Refills: 0 | Status: SHIPPED | OUTPATIENT
Start: 2022-08-15 | End: 2022-10-25

## 2022-08-15 RX ORDER — MIRTAZAPINE 30 MG/1
30 TABLET, FILM COATED ORAL AT BEDTIME
Qty: 90 TABLET | Refills: 3 | Status: SHIPPED | OUTPATIENT
Start: 2022-08-15

## 2022-08-15 NOTE — TELEPHONE ENCOUNTER
Refill request for divalproex.  Pt has upcoming appt on 8/19/22.  Medication T'd for review and signature  Rolanda Lomeli LPN on 8/15/2022 at 2:49 PM

## 2022-08-15 NOTE — TELEPHONE ENCOUNTER
Pending Prescriptions:                       Disp   Refills    mirtazapine (REMERON) 30 MG tablet                            Sig: Take 1 tablet (30 mg) by mouth

## 2022-08-17 NOTE — PROGRESS NOTES
In person evaluation    HPI  January 1, 2020, in person consultation transfer of care from Dr. Vikas Mcginnis  4/27/2020, telephone visit  8/3/2020, telephone visit  11/6/2020, video visit  8/13/2021, in person visit  2/16/2022, in person visit  8/19/2022, in person visit        87-year-old followed neurologically for:  Cognitive decline (memory loss)  Onset as far back as 2015  Difficulty remembering people's names  Previously cared for by Dr. Vikas Mcginnis   history of bipolar disorder  Abnormal EEG    Since last seen  No surgeries  No hospitalizations  No major illnesses  No falls    May be back on his mirtazapine 30 mg at nighttime to help with sleep prescribed by other physicians    Forgetfulness is about the same  Does use a four-wheel walker with hand break discussed gait safety    Patient does better when he can read lips but with everybody with the mask I make things difficult          A.  Mild cognitive impairment       Onset as far back as 2015       Trouble remembering people's names       Past abnormal EEG       Previously cared for by Dr. Vikas Mcginnis       Mother had some dementia the last year of life when she lived to be 90       Scored a 23 out of 30, (8/13/2021)       Scored a 25 out of 30, (January 2020)       Subtle change over a year and a half         We discussed cognitive decline       Exelon 3 mg 1 tab p.o. twice daily      Depakote 250 mg 1 p.o. nightly        No GERD no diarrhea no lightheadedness no black tarry stools no blood in the stool      Does have severe hearing loss at baseline      B.  Has history of bipolar disorder        Neurologic review from original visit August 2021  Patient feels that he has had a falloff in his memory has more trouble tracking and following conversations  Used to go to a group with his retired friends from [employer] but they cannot get together due to Covid  Has severe hearing loss in both ears  Hearing doctor thought that they could maybe make a hearing aid that would  work on the left ear but the not sure  With the mask on and talking to him he misses a lot of the conversation though  I am sure this is causing some difficulty with his memory recall and tracking conversations    Patient is still coherent can should chat about multiple topics  Can jump from topic to topic fairly easy  Does better when we talk about more things from the past  Scored a 23 out of 30, (8/13/2021)  Scored a 25 out of 30, (January 2020)  Subtle change over a year and a half        Neurologic summary:  Previously followed by Dr. Vikas Mcginnis  Mild cognitive impairment as far back as 2015  Patient works for [employer] as a   Retired in 1998  [facility] testing January 2020, 25 out of 30  Seems to have difficulty remembering people's names  Has significant hearing loss right greater than left left is not enough to be helped by hearing aid  Past history of bipolar disorder  Mother with dementia in the last year of her life at age 90  Uncle last year of life age 88 developed dementia  In the distant past of been on Depakote for 2 to 3 years for bipolar disorder  Has an abnormal EEG  Patient restarted on Depakote 2020  Talked about the pros and cons of using this to maybe stabilize the temporal lobe see if his symptoms are little less severe may be slow down the progression  His daughter has depression and is on Depakote    Patient tends to be quite active rows or exercises on the exercise bike regularly  Watches his diet hemoglobin A1c's have been good  Tries to stay active volunteers and participates in activities      Past medical history  Mild cognitive impairment  Bipolar disorder  Prostate cancer  Benign prostatic hypertrophy  Seborrheic dermatitis  Osteoarthritis with shoulder surgery  Left leg edema    Habits   Non-smoker  Does not drink alcohol  Worked as a [employer]  retired in 1998  Lives in seniors apartment with elevator  Does exercise regularly stays active      Family history  Mother had glaucoma  congestive heart failure dementia  about age 90  Father  at 34 with testicular cancer  He has 1 sister with some diabetes  Daughter with depression treated with Depakote  Uncle with difficulty with memory at the age of 88 last year of his life      Work-up review  MRI scan brain 2016 mild to moderate atrophy progressed mildly from 2010  Laboratory data 2019  Sodium 140 potassium 4.9 BUN 8 creatinine 0.8 glucose 94  White blood count 6.3 hemoglobin 13.2 platelets 313,000  Hemoglobin A1c 5.8%  Outside records from Dr. Mcginnis 2018, 2018 reviewed  EEG 2020 rare F7/T3 sharp waves with RAINE activity with hyperventilation mild to moderately abnormal  B12 689  TSH 1.57    [facility]  2020,    25 out of 30   2021,   23 out of 30   2022,   22 out of 30         review of systems    Chronic hearing loss seems to be worse than last time  Patient wearing the mask so it is difficult to communicate as he cannot read her lips    No headache no chest pain or shortness of breath no nausea vomiting no diarrhea no fever chills no abdominal pain no cough no sore throat    No diplopia dysarthria dysphagia  No focal weakness  No tremor  Gait is good    Has terrible hearing     Has impotence due to prostate hypertrophy and history of prostate cancer    Otherwise review systems negative    General exam  Alert oriented x3   blood pressure 120/72, pulse 71  HEENT significant for hearing loss in left ear and right ear poor hearing and has a hearing aid  Lungs clear  Abdomen soft  No edema the feet  Patient fan only eats 1 meal per day    Neurologic exam  Alert orient x3  Normal prosody speech  Normal naming  Normal comprehension  Normal repetition  No aphasia  No neglect  Previous [place] 25 out of 2020   [facility] 23 out of 30    Cranial 2 through 12 are significant for  Deaf in left ear significant hearing loss of the other  No ophthalmoplegia  No nystagmus  Face  symmetrical  Tongue twisters okay  Visual fields intact    Upper extremities  No drift no tremor normal finger-nose    Lower extremities  Distal proximal strength good    Gait  Gets up pushing off with his hands uses a four-wheel walker  Discussed gait safety  Has somewhat of a flexed posture          Assessment/Plan     1.  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)        Mild cognitive impairment      Onset as far back as 2015       Family history of dementia in the later years mom and uncle       History of bipolar disorder       Abnormal EEG F7/T3 sharp wave         Depakote 250 mg 1 tablet nightly       Exelon 3 mg capsule twice daily (added 8/13/2021)    Tolerating medication  Follow-up in March 2023  Discussed the pros and cons of trying a medication to help prevent further progression of difficulties   discussed with patient and wife    Patient will call sooner if there is any problems      We talked about different approaches and whether he should start some medication  Eats 1 meal a day discussed important to have good nutrition  Does exercise though regularly and stays active    low-dose of Exelon 3 mg once per day which is his choice  If he gets any GI distress we would stop it  Continue with the Depakote low-dose      Symptoms and signs seem relatively stable  No side effect of medication  Discussed the above also with patient and wife    Total care time today 32 minutes discussing multiple issues as above

## 2022-08-19 ENCOUNTER — OFFICE VISIT (OUTPATIENT)
Dept: NEUROLOGY | Facility: CLINIC | Age: 87
End: 2022-08-19
Payer: COMMERCIAL

## 2022-08-19 VITALS
WEIGHT: 142 LBS | HEIGHT: 70 IN | DIASTOLIC BLOOD PRESSURE: 72 MMHG | SYSTOLIC BLOOD PRESSURE: 120 MMHG | HEART RATE: 71 BPM | BODY MASS INDEX: 20.33 KG/M2

## 2022-08-19 DIAGNOSIS — G31.84 MCI (MILD COGNITIVE IMPAIRMENT): Primary | ICD-10-CM

## 2022-08-19 PROCEDURE — 99214 OFFICE O/P EST MOD 30 MIN: CPT | Performed by: PSYCHIATRY & NEUROLOGY

## 2022-08-19 RX ORDER — RIVASTIGMINE TARTRATE 3 MG/1
3 CAPSULE ORAL AT BEDTIME
Qty: 90 CAPSULE | Refills: 3 | Status: SHIPPED | OUTPATIENT
Start: 2022-08-19 | End: 2023-03-13

## 2022-08-19 ASSESSMENT — MONTREAL COGNITIVE ASSESSMENT (MOCA)
WHAT LEVEL OF EDUCATION WAS ATTAINED: 0
8. SERIAL SUBTRACTION OF 7S: 3
4. NAME EACH OF THE THREE ANIMALS SHOWN: 3
11. FOR EACH PAIR OF WORDS, WHAT CATEGORY DO THEY BELONG TO (OUT OF 2): 2
12. MEMORY INDEX SCORE: 0
7. [VIGILENCE] TAP WHEN HEARING DESIGNATED LETTER: 1
13. ORIENTATION SUBSCORE: 4
WHAT IS THE TOTAL SCORE (OUT OF 30): 22
9. REPEAT EACH SENTENCE: 2
VISUOSPATIAL/EXECUTIVE SUBSCORE: 5
6. READ LIST OF DIGITS [FORWARD/BACKWARD]: 2
10. [FLUENCY] NAME WORDS STARTING WITH DESIGNATED LETTER: 0

## 2022-08-19 NOTE — NURSING NOTE
Chief Complaint   Patient presents with     Memory Loss     6 month follow up. Denies any concerns     Rolanda Lomeli LPN on 8/19/2022 at 10:57 AM

## 2022-08-19 NOTE — LETTER
8/19/2022         RE: Patrick Wharton  [address]  [place] [address]        Dear Colleague,    Thank you for referring your patient, Patrick Wharton, to the [facility] [place]. Please see a copy of my visit note below.    In person evaluation    HPI  January 1, 2020, in person consultation transfer of care from Dr. Vikas Mcginnis  4/27/2020, telephone visit  8/3/2020, telephone visit  11/6/2020, video visit  8/13/2021, in person visit  2/16/2022, in person visit  8/19/2022, in person visit        87-year-old followed neurologically for:  Cognitive decline (memory loss)  Onset as far back as 2015  Difficulty remembering people's names  Previously cared for by Dr. Vikas Mcginnis   history of bipolar disorder  Abnormal EEG    Since last seen  No surgeries  No hospitalizations  No major illnesses  No falls    May be back on his mirtazapine 30 mg at nighttime to help with sleep prescribed by other physicians    Forgetfulness is about the same  Does use a four-wheel walker with hand break discussed gait safety    Patient does better when he can read lips but with everybody with the mask I make things difficult          A.  Mild cognitive impairment       Onset as far back as 2015       Trouble remembering people's names       Past abnormal EEG       Previously cared for by Dr. Vikas Mcginnis       Mother had some dementia the last year of life when she lived to be 90       Scored a 23 out of 30, (8/13/2021)       Scored a 25 out of 30, (January 2020)       Subtle change over a year and a half         We discussed cognitive decline       Exelon 3 mg 1 tab p.o. twice daily      Depakote 250 mg 1 p.o. nightly        No GERD no diarrhea no lightheadedness no black tarry stools no blood in the stool      Does have severe hearing loss at baseline      B.  Has history of bipolar disorder        Neurologic review from original visit August 2021  Patient feels that he has had a falloff in his memory  has more trouble tracking and following conversations  Used to go to a group with his retired friends from [employer] but they cannot get together due to Covid  Has severe hearing loss in both ears  Hearing doctor thought that they could maybe make a hearing aid that would work on the left ear but the not sure  With the mask on and talking to him he misses a lot of the conversation though  I am sure this is causing some difficulty with his memory recall and tracking conversations    Patient is still coherent can should chat about multiple topics  Can jump from topic to topic fairly easy  Does better when we talk about more things from the past  Scored a 23 out of 30, (8/13/2021)  Scored a 25 out of 30, (January 2020)  Subtle change over a year and a half        Neurologic summary:  Previously followed by Dr. Vikas Mcginnis  Mild cognitive impairment as far back as 2015  Patient works for [employer] as a   Retired in 1998  [employer] testing January 2020, 25 out of 30  Seems to have difficulty remembering people's names  Has significant hearing loss right greater than left left is not enough to be helped by hearing aid  Past history of bipolar disorder  Mother with dementia in the last year of her life at age 90  Uncle last year of life age 88 developed dementia  In the distant past of been on Depakote for 2 to 3 years for bipolar disorder  Has an abnormal EEG  Patient restarted on Depakote 2020  Talked about the pros and cons of using this to maybe stabilize the temporal lobe see if his symptoms are little less severe may be slow down the progression  His daughter has depression and is on Depakote    Patient tends to be quite active rows or exercises on the exercise bike regularly  Watches his diet hemoglobin A1c's have been good  Tries to stay active volunteers and participates in activities      Past medical history  Mild cognitive impairment  Bipolar disorder  Prostate cancer  Benign prostatic hypertrophy  Seborrheic  dermatitis  Osteoarthritis with shoulder surgery  Left leg edema    Habits   Non-smoker  Does not drink alcohol  Worked as a [employer]  retired in   Lives in seniors apartment with elevator  Does exercise regularly stays active      Family history  Mother had glaucoma congestive heart failure dementia  about age 90  Father  at 34 with testicular cancer  He has 1 sister with some diabetes  Daughter with depression treated with Depakote  Uncle with difficulty with memory at the age of 88 last year of his life      Work-up review  MRI scan brain 2016 mild to moderate atrophy progressed mildly from   Laboratory data 2019  Sodium 140 potassium 4.9 BUN 8 creatinine 0.8 glucose 94  White blood count 6.3 hemoglobin 13.2 platelets 313,000  Hemoglobin A1c 5.8%  Outside records from Dr. Mcginnis 2018, 2018 reviewed  EEG 2020 rare F7/T3 sharp waves with RAINE activity with hyperventilation mild to moderately abnormal  B12 689  TSH 1.57    [facility]  2020,    25 out of 30   2021,   23 out of 30   2022,   22 out of 30         review of systems    Chronic hearing loss seems to be worse than last time  Patient wearing the mask so it is difficult to communicate as he cannot read her lips    No headache no chest pain or shortness of breath no nausea vomiting no diarrhea no fever chills no abdominal pain no cough no sore throat    No diplopia dysarthria dysphagia  No focal weakness  No tremor  Gait is good    Has terrible hearing     Has impotence due to prostate hypertrophy and history of prostate cancer    Otherwise review systems negative    General exam  Alert oriented x3   blood pressure 120/72, pulse 71  HEENT significant for hearing loss in left ear and right ear poor hearing and has a hearing aid  Lungs clear  Abdomen soft  No edema the feet  Patient fan only eats 1 meal per day    Neurologic exam  Alert orient x3  Normal prosody speech  Normal naming  Normal  comprehension  Normal repetition  No aphasia  No neglect  Previous [place] 25 out of 30 January 2020   [facility] 23 out of 30    Cranial 2 through 12 are significant for  Deaf in left ear significant hearing loss of the other  No ophthalmoplegia  No nystagmus  Face symmetrical  Tongue twisters okay  Visual fields intact    Upper extremities  No drift no tremor normal finger-nose    Lower extremities  Distal proximal strength good    Gait  Gets up pushing off with his hands uses a four-wheel walker  Discussed gait safety  Has somewhat of a flexed posture          Assessment/Plan     1.  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)        Mild cognitive impairment      Onset as far back as 2015       Family history of dementia in the later years mom and uncle       History of bipolar disorder       Abnormal EEG F7/T3 sharp wave         Depakote 250 mg 1 tablet nightly       Exelon 3 mg capsule twice daily (added 8/13/2021)    Tolerating medication  Follow-up in March 2023  Discussed the pros and cons of trying a medication to help prevent further progression of difficulties   discussed with patient and wife    Patient will call sooner if there is any problems      We talked about different approaches and whether he should start some medication  Eats 1 meal a day discussed important to have good nutrition  Does exercise though regularly and stays active    low-dose of Exelon 3 mg once per day which is his choice  If he gets any GI distress we would stop it  Continue with the Depakote low-dose      Symptoms and signs seem relatively stable  No side effect of medication  Discussed the above also with patient and wife    Total care time today 32 minutes discussing multiple issues as above      Again, thank you for allowing me to participate in the care of your patient.        Sincerely,        Ramon Rene MD

## 2022-08-19 NOTE — NURSING NOTE
JULIAN COGNITIVE ASSESSMENT ([facility])  Version 7.1 Original Version  VISUOSPATIAL/EXECUTIVE               COPY CUBE      [ 1   ]                                [  1  ] DRAW CLOCK (Ten past eleven)  (3 points)    [ 1   ]                    [ 1   ]               [   1 ]       Contour            Numbers     Hands POINTS                  5 / 5   NAMING    [  1 ]                                                                        [   1 ]                                             [1    ]  Lishabnam Hickey                                Camel                    3 / 3   MEMORY Read list of words, subject must repeat them. Do 2 trials, even if 1st trial is successful. Do a recall after 5 minutes  FACE VELVET Yarsani FIDEL RED No Points    1st          2nd         ATTENTION Read list of digits (1 digit/sec) Subject has to repeat in the forward order       [  1  ]   2  1  8  5  4                                [  1  ] 7 4 2                         2 /2   Read list of letters. The subject must tap with his hand at each letter A. No points if > 2 errors.  [ 1   ] F B A C M N A A J K L B A F A K D E A A A J A M O F A A B              1/1   Serial 7 subtraction starting at 100          [  1  ] 93         [  1  ] 86          [ 1   ] 79          [ 1   ] 72         [  1  ] 65   4 or 5 correct subtractions: 3 points,  2 or 3 correct: 2 points,  1correct: 1 point,   0 correct: 0 points           3 /3   LANGUAGE Repeat: I only know that Patrick is the one to help today. [  1   ]                                      The cat always hid under the couch when dogs were in the room. [1   ]              2 /2   Fluency: Name maximum number of words in one minute that begin with the letter F                                                                                                                    [ 0   ] _8__ (N > 11 words)              0 /1   ABSTRACTION Similarity  between e.g. banana-orange=fruit                                                                   [  1  ] train-bicycle                      [ 1   ] watch-ruler             2 /2   DELAYED  RECALL Has to recall words  WITH NO CUE FACE  [  0  ] VELVET  [  0  ] Roman Catholic  [ 0   ]  FIDEL  [   0 ] RED  [0   ] Points for UNCUED recall only          0  /5           OPTIONAL Category cue           Multiple choice cue          ORIENTATION  [  0  ] Date     [ 1   ] Month       [   1 ] Year      [ 0   ] Day      [  1  ] Place        [  1  ] City         4 /6   TOTAL  Normal > 26/30 Add 1 point if < 12 years education      22 /30

## 2022-10-24 ENCOUNTER — TELEPHONE (OUTPATIENT)
Dept: NEUROLOGY | Facility: CLINIC | Age: 87
End: 2022-10-24

## 2022-10-24 DIAGNOSIS — G31.84 MCI (MILD COGNITIVE IMPAIRMENT): ICD-10-CM

## 2022-10-25 RX ORDER — DIVALPROEX SODIUM 250 MG/1
250 TABLET, DELAYED RELEASE ORAL DAILY
Qty: 90 TABLET | Refills: 1 | Status: SHIPPED | OUTPATIENT
Start: 2022-10-25 | End: 2023-03-13

## 2022-10-25 NOTE — TELEPHONE ENCOUNTER
Signed Prescriptions:                        Disp   Refills    divalproex sodium delayed-release (DEPAKOT*90 tab*1        Sig: Take 1 tablet (250 mg) by mouth daily  Authorizing Provider: FILIBERTO CASTRO  Ordering User: JEMAL ROSA    LOV: 8/19/22    NOV: 3/13/22    Per LOV note: Depakote 250 mg 1 tablet nightly    Refill: Approved for 90 days with 1 refill    Jemal Rosa RN, BSN  [facility]

## 2022-10-27 ENCOUNTER — NURSE TRIAGE (OUTPATIENT)
Dept: NURSING | Facility: CLINIC | Age: 87
End: 2022-10-27

## 2022-10-28 ENCOUNTER — VIRTUAL VISIT (OUTPATIENT)
Dept: FAMILY MEDICINE | Facility: CLINIC | Age: 87
End: 2022-10-28
Payer: COMMERCIAL

## 2022-10-28 DIAGNOSIS — U07.1 INFECTION DUE TO 2019 NOVEL CORONAVIRUS: Primary | ICD-10-CM

## 2022-10-28 PROCEDURE — 99213 OFFICE O/P EST LOW 20 MIN: CPT | Mod: CS | Performed by: FAMILY MEDICINE

## 2022-10-28 NOTE — PROGRESS NOTES
"Patrick is a 87 year old who is being evaluated via a billable telephone visit.      What phone number would you like to be contacted at? [phone]  How would you like to obtain your AVS? MyChart     =========================================================================    Assessment & Plan     Infection due to 2019 novel coronavirus  - nirmatrelvir and ritonavir (PAXLOVID) therapy pack; Take 3 tablets by mouth 2 times daily for 5 days (Take 2 Nirmatrelvir tablets and 1 Ritonavir tablet twice daily for 5 days)    COVID-19 positive patient.  Encounter for consideration of medication intervention. Patient does qualify for a prescription. Full discussion with patient including medication options, risks and benefits. Potential drug interactions reviewed with patient.     Treatment Planned Paxlovid sent to [facility] pharmacy  Temporary change to home medications:  None     Patient is very high risk for admission.  Currently unable to care for himself and his wife (sole caretaker) has COVID as well.  They do have a lot of family support and people checking in on them.  Patrick does not sound short of breath on the phone and O2 sats at the time of his visit were >90.  Advised his wife to call 911 if she is unable to care for him over the weekend or if his O2 sats are consistently <90.      Estimated body mass index is 20.67 kg/m  as calculated from the following:    Height as of 8/19/22: 1.765 m (5' 9.5\").    Weight as of 8/19/22: 64.4 kg (142 lb).  GFR Estimate   Date Value Ref Range Status   10/05/2021 83 >60 mL/min/1.73m2 Final     Comment:     As of July 11, 2021, eGFR is calculated by the CKD-EPI creatinine equation, without race adjustment. eGFR can be influenced by muscle mass, exercise, and diet. The reported eGFR is an estimation only and is only applicable if the renal function is stable.   12/18/2020 >60 >60 mL/min/1.73m2 Final     Lab Results   Component Value Date    [id number] NEGATIVE 12/20/2020       Shivani Kaiser" DO R[facility]  [facility]    ===================================================================    Subjective   Patrick is a 87 year old accompanied by his spouse, presenting for the following health issues:  Other (COVID)    HPI     COVID-19 Symptom Review  How many days ago did these symptoms start? Three days ago.  Tested positive this morning.    Are any of the following symptoms significant for you?    New or worsening difficulty breathing? No    Worsening cough? Yes, it's a dry cough.     Fever or chills? unsure    Headache: No    Sore throat: No    Chest pain: No    Diarrhea: No    Body aches? No    Patient is hardly able to walk and stand and O2 is only 84 this morning.  Wife was in ER yesterday and had positive COVID testing.  Patrick has been weak and having trouble walking around the house.      What treatments has patient tried? None   Does patient live in a nursing home, group home, or shelter? No  Does patient have a way to get food/medications during quarantined? Yes    Review of Systems   Constitutional, HEENT, cardiovascular, pulmonary, gi and gu systems are negative, except as otherwise noted.        Objective    Vitals - Patient Reported  SpO2 (Patient Reported): 93  Pulse (Patient Reported): 101  Vitals:  No vitals were obtained today due to virtual visit.    Physical Exam   healthy, alert and no distress  PSYCH: Alert and oriented times 3; coherent speech, normal   rate and volume, able to articulate logical thoughts, able   to abstract reason, no tangential thoughts, no hallucinations   or delusions  His affect is normal  RESP: No cough, no audible wheezing, able to talk in full sentences  Remainder of exam unable to be completed due to telephone visits        Phone call duration: 19 minutes

## 2022-10-28 NOTE — PATIENT INSTRUCTIONS
COVID-19 Outpatient Treatments  Your care team can help you find the best treatments for COVID-19. Talk to a health care provider or refer to the FDA medicine fact sheets below.    Important: You CAN'T have molnupiravir or Paxlovid if you are starting the medicine more than 5 days after your symptoms have started.  Paxlovid: https://www.fda.gov/media/975368/download  Molnupiravir: https://www.fda.gov/media/787304/download  Monoclonal antibodies: https://combatcovid.hhs.gov/what-are-monoclonal-antibodies  Paxlovid (nimatrelvir and ritonavir)  How it works  Two medicines (nirmatrelvir and ritonavir) are taken together. They stop the virus from growing. Less amount of virus is easier for your body to fight.  Benefits  Lowers risk of a hospital stay or death from COVID-19.  How to take    Medicine comes in a daily container with both medicine tablets. Take by mouth twice daily (once in the morning, once at night) for 5 days.    The number of tablets to take varies by patient.    Don't chew or break capsules. Swallow whole.  When to take  Take as soon as possible after positive COVID-19 test result, and within 5 days of your first symptoms.  Who can take it  Patients must be 12 years or older, weigh at least 88 pounds, and have tested positive for COVID-19. This is the preferred treatment for pregnant patients.  Possible side effects  Can cause altered sense of taste, diarrhea (loose, watery stools), high blood pressure, muscle aches.  Medicine conflicts    Some medicines may conflict with Paxlovid and may cause serious side effects.    Tell your care team about all the medicines you take, including prescription and over-the-counter medicines, vitamins and herbal supplements.    Your provider will review your medicines to make sure that you can safely take Paxlovid.  Cautions    Paxlovid is not advised for patients with severe kidney or liver disease. If you have kidney or liver problems, the dose may need to be  changed.    If you are pregnant or breastfeeding, talk to your care team about your options.    If you are sexually active, use trusted birth control while taking Paxlovid.  Molnupiravir  How it works  Stops the virus from growing. Less amount of virus is easier for your body to fight.  Benefits  Lowers risk of a hospital stay or death from COVID-19.  How to take  Take 4 capsules by mouth every 12 hours (4 in the morning and 4 at night) for 5 days. Don't chew or break capsules. Swallow whole.  When to take  Take as soon as possible after positive COVID-19 test result, and within 5 days of your first symptoms.  Who can take it  Patients must be 18 years or older and have tested positive for COVID-19.  Possible side effects  Diarrhea (loose, watery stools), nausea (feeling sick to your stomach), dizziness, headaches.  Medicine conflicts  Right now, there are no known conflicts with other drugs. But tell your care team all medicines you take.  Cautions    This is not advised for patients who are pregnant.    Patients who could become pregnant should use trusted birth control until 4 days after their last dose.    Sexually active people of any gender should use trusted birth control for 3 months after their last dose.  Monoclonal antibodies  How it works  Monoclonal antibodies can detect pieces of the COVID virus and stop it from infecting your cells.  Benefits  Lowers risk of a hospital stay or death from COVID-19. Monoclonal antibodies are known to work well against the omicron variant.  How it is given to you  You will receive the treatment either by an infusion through your vein (IV) or shots.  When to take  Get as soon as possible after you test positive for COVID-19, and within 7 days of your first symptoms.  Who can take it  Patients must be 12 years or older, weigh at least 88 pounds and have tested positive for COVID-19.  Possible side effects  Fever, chills, diarrhea (loose, watery stools), dizziness,  itchiness and rash.  More serious side effects include: fever, difficulty breathing, low oxygen level in your blood, chills, tiredness, fast or slow heart rate, chest discomfort or pain, weakness, confusion, nausea, headache, shortness of breath, low or high blood pressure, wheezing, swelling of your lips, face, or throat, rash including hives, itching, muscle aches, dizziness, feeling faint and sweating.  If you receive an IV, you may have brief pain, bleeding and bruising of the skin, soreness, swelling and possible infection at the place where you get the IV needle.  Medicine conflicts  Please tell you care team other medicines you take so they can assess if there are any conflicts.  Cautions  Your doctor will talk with you about risks and benefits of this treatment and will help choose the best option for you.  For informational purposes only. Not to replace the advice of your health care provider.  Copyright   2022 [facility]. All rights reserved. Clinically reviewed by Kerri Aviles. [employer] [id number] - 08/22.

## 2022-10-28 NOTE — TELEPHONE ENCOUNTER
Jessica Wharton calling from [place]. She is not on consent to communicate.     Advised Jessica Writer is unable to communicate with her, she can have pt or caregiver on consent to communicate or caregiver with pt call in to speak to a nurse.    Jessica verbalizes understanding and agrees to plan.

## 2022-10-28 NOTE — TELEPHONE ENCOUNTER
Nurse Triage    Is this a 2nd Level Triage? NO    Situation: Daughter Vaishali calling with concerns for generalized weakness.  Consent: obtained verbally from patient    Background: Pt's daughter Vaishali states that patient has fallen twice today due to weakness. The daughter states the patient did not have any injuries. The daughter would like to get the patient scheduled for a doctor's appointment due to his weakness. Of note, pt's wife is at the hospital ED now with Covid. Pt has not yet tested for Covid, but daughter states she will get him tested tomorrow.     Assessment: Pt is not having any SOB, chest pain or headache. The daughter does not believe that the patient has a fever. Pt does have a cough. The daughter states he is drinking fluids. Daughter states someone will stay with the patient tonight.      Protocol Recommended Disposition:   Schedule with a provider    Recommendation: Advised patient/caregiver to make an appointment. Transferred to scheduling. The daughter is interested in getting a virtual visit scheduled due to patient's weakness. Explained to daughter that if pt has a positive Covid test tomorrow, that he should call back to schedule a virtual visit for treatment of covid. Care advice given. Reviewed concerning symptoms and when to call back. Advised caregiver to call back if any SOB, chest pain or concerning symptoms.     Rosita Gatica RN [facility] 10/27/2022 11:20 PM    Reason for Disposition    HIGH RISK for severe COVID complications (e.g., weak immune system, age > 64 years, obesity with BMI > 25, pregnant, chronic lung disease or other chronic medical condition)  (Exception: Already seen by PCP and no new or worsening symptoms.)    Additional Information    Negative: SEVERE difficulty breathing (e.g., struggling for each breath, speaks in single words)    Negative: Difficult to awaken or acting confused (e.g., disoriented, slurred speech)    Negative: Bluish (or gray) lips  or face now    Negative: Shock suspected (e.g., cold/pale/clammy skin, too weak to stand, low BP, rapid pulse)    Negative: Sounds like a life-threatening emergency to the triager    Negative: [1] Diagnosed or suspected COVID-19 AND [2] symptoms lasting 3 or more weeks    Negative: [1] COVID-19 exposure AND [2] no symptoms    Negative: COVID-19 vaccine reaction suspected (e.g., fever, headache, muscle aches) occurring 1 to 3 days after getting vaccine    Negative: COVID-19 vaccine, questions about    Negative: [1] Lives with someone known to have influenza (flu test positive) AND [2] flu-like symptoms (e.g., cough, runny nose, sore throat, SOB; with or without fever)    Negative: [1] Adult with possible COVID-19 symptoms AND [2] triager concerned about severity of symptoms or other causes    Negative: COVID-19 and breastfeeding, questions about    Negative: SEVERE or constant chest pain or pressure  (Exception: Mild central chest pain, present only when coughing.)    Negative: MODERATE difficulty breathing (e.g., speaks in phrases, SOB even at rest, pulse 100-120)    Negative: [1] Headache AND [2] stiff neck (can't touch chin to chest)    Negative: Oxygen level (e.g., pulse oximetry) 90 percent or lower    Negative: Chest pain or pressure    Negative: Patient sounds very sick or weak to the triager    Negative: MILD difficulty breathing (e.g., minimal/no SOB at rest, SOB with walking, pulse <100)    Negative: Fever > 103 F (39.4 C)    Negative: [1] Fever > 101 F (38.3 C) AND [2] age > 60 years    Negative: [1] Fever > 100.0 F (37.8 C) AND [2] bedridden (e.g., nursing home patient, CVA, chronic illness, recovering from surgery)    Protocols used: CORONAVIRUS (COVID-19) DIAGNOSED OR [id number] 1.18.2022

## 2022-10-29 ENCOUNTER — HEALTH MAINTENANCE LETTER (OUTPATIENT)
Age: 87
End: 2022-10-29

## 2022-10-31 ENCOUNTER — TELEPHONE (OUTPATIENT)
Dept: FAMILY MEDICINE | Facility: CLINIC | Age: 87
End: 2022-10-31

## 2022-10-31 NOTE — TELEPHONE ENCOUNTER
Patient called and stated he tested positive for Covid a few days ago. His wife also tested positive this morning so he has started the Paxlovid and wanted to inform Dr. Riley.     Patient states he will see Dr. Riley on December 1 for his annual physical.     Please see message left from daughter below.

## 2022-10-31 NOTE — TELEPHONE ENCOUNTER
Please call daughter to determine more specifically the concerns.  I would not be able to call the daughter myself at this point in time.

## 2022-10-31 NOTE — TELEPHONE ENCOUNTER
Daughter reports pt is weak and has been falling. At least three falls over the weekend, pt isn't able to walk due to weakness. Daughter is out of state and as such is unaware of any injuries. Very concerned about their living situation; currently living independently.     Per note on 10/29 please send consent to communicate for pt to sign.    Jessica is requesting someone call pt to check in. She would also appreciate a call to discuss her concerns. She said it's fine to leave a message.

## 2022-11-01 NOTE — TELEPHONE ENCOUNTER
Spoke with Jessica, one of Patrick's daughters. She lives out of state. She had some concerns for Patrick's recent Covid diagnosis. Patrick and his wife both tested positive for Covid over the weekend and are on Paxlovid. She states they are doing alright. She was inquiring about Home Care.  I told her we would start with a phone conference next week to discuss all concerns with Dr Riley.    I called Patrick as well. His wife was also on the call. One of the daughters was also at the residence. He states he is doing pretty well. He has 2 days of Paxlovid left. He says he is mostly in a wheelchair now. He didn't really have any concerns. I was able to get Patrick scheduled for a long phone call visit on Tuesday, November 8th with Dr Riley in the afternoon. We will attempt to conference in his daughter on the phone call.    Fyi only

## 2022-11-02 ENCOUNTER — NURSE TRIAGE (OUTPATIENT)
Dept: FAMILY MEDICINE | Facility: CLINIC | Age: 87
End: 2022-11-02

## 2022-11-02 NOTE — TELEPHONE ENCOUNTER
"Situation:  Low O2 readings on 11/1: 93, 72, 86 and 11/2: 82.     Background:  Was recently diagnosed with Covid on 10/28.      Assessment:  O2 reading while on phone was at 97% pulse at 65.   Patient denies any SOB at rest, dizziness, weakness, chest tightness, bluish lips, or any other symptoms. He states he feels fine.   Wife states he is doing much better then he has been since Covid diagnosis. He is a lot stronger.   While speaking with patient and wife on the phone patient was speaking in full sentences without SOB.   Wife did state that when they put on pulse ox once they have reading they take it off. Advise to leave it on for a couple of minutes before taking the reading.     Recommendation:  Warm up hands before putting the O2 saturation on. Advised that sometimes this can make readings off also advised to try to stick to the middle finger. Advised to check O2 once in the AM, afternoon, and PM.   Advised to call 911 if he is having any sort of difficulty breathing, lips turn blue, or if he his symptoms change.     Patient has appointment with Dr. Riley on 11/8.     If any further recommendations, please advise otherwise will monitor for now and follow disposition until 11/08 appointment.       Reason for Disposition    Pulse oximetry, questions about    Answer Assessment - Initial Assessment Questions  1. MAIN CONCERN OR SYMPTOM: \"What's your main concern?\" (e.g., low oxygen level, breathing difficulty) \"What question do you have?\"      Low oxygen levels   2. ONSET: \"When did the  10/28  start?\"       11/1-93,72,86  11/2-87  3. OXYGEN THERAPY:     - \"Do you currently use home oxygen?\"    - If Yes, ask: \"What is your oxygen source?\" (e.g., O2 tank, O2 concentrator).     - If Yes, ask: \"How do you get the oxygen?\" (e.g., nasal prongs, face mask).     - If Yes, ask: \"How much oxygen are you supposed to use?\" (e.g., 1-2 L NC)      No not using any oxygen  4.  OXYGEN EQUIPMENT:  \"Are you having any trouble with " "your oxygen equipment?\"  (e.g., cannula, mask, tubing, tank, concentrator)      Not currently using any oxygen  5. OXYGEN SATURATION MONITOR:      - \"Do you use an oxygen saturation monitor (pulse oximeter) at home?\"     - If Yes, ask: \"Where do you place the probe?\" (e.g., fingertip, ear lobe)      Uses oxygen saturation monitor at home and puts it on his fingertip  6. OXYGEN LEVEL: \"What is your reading (oxygen level) today?\" \"What is your usual oxygen saturation reading?\" (e.g., 95%)  Right now while on phone it is at a 97%  7: VSS MONITORING: \"Do you monitor/measure your oxygen level or vital signs?\" (e.g., yes, no, measurements are automatically sent to provider/call center). Document CURRENT and NORMAL BASELINE values if available.      -  P: \"What is your pulse rate per minute?\"    -  RR: \"What is your respiratory rate per minute?\"      Pluse: 65 while on the phone  8. BREATHING DIFFICULTY: \"Are you having any difficulty breathing?\" If Yes, ask: \"How bad is it?\"  (e.g., none, mild, moderate, severe)     - MILD: No SOB at rest, mild SOB with walking, speaks normally in sentences, able to lie down, no retractions, pulse < 100.     - MODERATE: SOB at rest, SOB with minimal exertion and prefers to sit, cannot lie down flat, speaks in phrases, mild retractions, audible wheezing, pulse 100-120.     - SEVERE: Very SOB at rest, speaks in single words, struggling to breathe, sitting hunched forward, retractions, pulse > 120       Has no difficulty breathing  9. OTHER SYMPTOMS: \"Do you have any other symptoms?\" (e.g., fever, change in sputum)      Denies any fever or any other symptoms   10. SMOKING: \"Do you smoke currently?\" \"Is there anyone that smokes around you?\"  (Note: smoking around oxygen is dangerous!)        Not a smoker    Protocols used: OXYGEN MONITORING AND [id number]    Kaye Freeman RN on 11/2/2022 at 4:03 PM    "

## 2022-11-02 NOTE — TELEPHONE ENCOUNTER
O2 saturation readings with fluctuation likely due to incorrect readings.  Would follow instruction per nurse and recheck to confirm best reading in order to maintain greater than 90% ideally.  Notify if symptomatic worsening, shortness of breath etc.

## 2022-11-08 ENCOUNTER — VIRTUAL VISIT (OUTPATIENT)
Dept: FAMILY MEDICINE | Facility: CLINIC | Age: 87
End: 2022-11-08
Payer: COMMERCIAL

## 2022-11-08 DIAGNOSIS — R29.818 DIFFICULTY BALANCING: ICD-10-CM

## 2022-11-08 DIAGNOSIS — G31.84 MCI (MILD COGNITIVE IMPAIRMENT): Primary | ICD-10-CM

## 2022-11-08 DIAGNOSIS — Z86.16 HISTORY OF COVID-19: ICD-10-CM
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
## 2022-11-08 DIAGNOSIS — Z85.46 HISTORY OF PROSTATE CANCER: ICD-10-CM
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
## 2022-11-08 PROCEDURE — 99213 OFFICE O/P EST LOW 20 MIN: CPT | Mod: 95 | Performed by: FAMILY MEDICINE

## 2022-11-08 NOTE — PROGRESS NOTES
Patrick is a 87 year old who is being evaluated via a billable telephone visit.      What phone number would you like to be contacted at? [phone]  How would you like to obtain your AVS? Alonso Nguyen was seen today for covid concern and recheck.    Diagnoses and all orders for this visit:    MCI (mild cognitive impairment)  -     Home Care Referral    Difficulty balancing  -     Home Care Referral    Fall in home, sequela  -     Home Care Referral    History of prostate cancer    History of COVID-19           Subjective   Patrick is a 87 year old is a history of bipolar disorder, cognitive impairment under the care of neurologist, chronic anemia and recent history of shoulder arthroplasty.  He has prediabetes with last A1c of 5.3.  He has a history of prostate cancer with a PSA test that was detectable at 0.2 in October 2021.  Other than monitoring no specific action has been taken in regard to his prostate.    He was recently diagnosed with COVID, he had a virtual visit and was started on paxlovid on October 28 and his daughter called and expressed some concerns about his current situation.  Patient was contacted by phone and reported that things were going well we arranged for this visit to check-in and coordinate care.    He and his wife did not want to have a conference call that gave me admission to talk to their daughter Jessica after our initial conversation.    Reports his lungs recover from COVID.  His wife feels me and that his mobility has decreased in recent weeks to months.  He is now pretty much confined to a manual wheelchair to move around their home.  He had a fall at home within the past couple of weeks.  They do not report any significant injuries.  Patrick reports that he is fine and is his normal self.  His wife does not report any concern about injuries.  When I spoke with daughter later she expressed concern about potential injury and the reasons for his overall decline in mobility.  I reviewed his  recent neurologic consultation from August 2022 noting that there is no mention of significant neurologic decline from a physical status.  I do suspect that likely his cognitive state has declined further since my interaction with him back in April.  I did not make any specific quantifiable test today.  He definitely agrees that he is having mobility and balance issues.    We also discussed with his history of prostate cancer status posttreatment and increasing PSA although the PSA still very low at 0.2 a year ago this does warrant consideration.  He also has a history of spine problems which could affect mobility if there is any worsening concerns.  It would be ideal to evaluate him in person but we did discuss the benefits of home care for making an assessment as well as initiating therapy to maintain mobility is much as possible and/or improved.        Review of Systems   Complete review of systems is obtained.  Other than the specific considerations noted above complete review of systems is negative.        Objective           Vitals:  No vitals were obtained today due to virtual visit.    Physical Exam   healthy, alert and no distress  PSYCH: Alert and oriented times 3; coherent speech, normal   rate and volume, able to articulate logical thoughts, able   to abstract reason, no tangential thoughts, no hallucinations   or delusions  His affect is normal  RESP: No cough, no audible wheezing, able to talk in full sentences  Remainder of exam unable to be completed due to telephone visits                Phone call duration: 18 minutes

## 2022-11-09 ENCOUNTER — TELEPHONE (OUTPATIENT)
Dept: FAMILY MEDICINE | Facility: CLINIC | Age: 87
End: 2022-11-09

## 2022-11-09 NOTE — TELEPHONE ENCOUNTER
Daniella calling to advise that the pt needs to be seen in person or video prior to them taking referral.

## 2022-11-09 NOTE — TELEPHONE ENCOUNTER
General Call    Contacts       Type Contact Phone/Fax    11/09/2022 08:09 AM CST Phone (Incoming) Jeannine-[facility] [phone]     ext [phone]        Reason for Call:  Home care    What are your questions or concerns:  Jeannine from [facility] received orders for PT/OT but need orders for skilled nursing also   Please call and give verbal or place order in Epic for them.    Jeannine [phone] ext [phone]

## 2022-11-13 ENCOUNTER — MEDICAL CORRESPONDENCE (OUTPATIENT)
Dept: HEALTH INFORMATION MANAGEMENT | Facility: CLINIC | Age: 87
End: 2022-11-13

## 2022-11-14 ENCOUNTER — TELEPHONE (OUTPATIENT)
Dept: FAMILY MEDICINE | Facility: CLINIC | Age: 87
End: 2022-11-14

## 2022-11-14 NOTE — TELEPHONE ENCOUNTER
Mindy called for the following orders:    Skilled nursing/home care:    2x week for 1 week  1x week for 2 week  Every other week for 3 visits.    To work on skin assessments, infection prevention, disease mgmt.    PT  Evaluation  For strengthening, balance, and gait training.       For long term planning, and fall alert bracelet.    Verbal orders given.

## 2022-12-01 ENCOUNTER — TELEPHONE (OUTPATIENT)
Dept: NURSING | Facility: CLINIC | Age: 87
End: 2022-12-01

## 2022-12-01 NOTE — TELEPHONE ENCOUNTER
Will await fax orders. Will probably receive on Friday. Can wait for dr campos to review and sign off on when he returns.

## 2022-12-01 NOTE — TELEPHONE ENCOUNTER
Caller calling from [facility] states orders and plan of care (6pages) were faxed on 11/21 and have not received signed orders back.   Verified [facility] fax number with caller, different from what they had on file. Caller will check and refax orders.      Esteban Brandt RN, BSN  12/1/2022 at 4:32 PM  [facility]

## 2022-12-02 ASSESSMENT — ENCOUNTER SYMPTOMS
EYE PAIN: 0
WEAKNESS: 0
DIZZINESS: 0
FREQUENCY: 0
NAUSEA: 0
CONSTIPATION: 0
HEARTBURN: 0
HEMATOCHEZIA: 0
DYSURIA: 0
NERVOUS/ANXIOUS: 0
ARTHRALGIAS: 0
FEVER: 0
SHORTNESS OF BREATH: 0
COUGH: 0
SORE THROAT: 0
HEMATURIA: 0
PARESTHESIAS: 0
MYALGIAS: 0
PALPITATIONS: 0
ABDOMINAL PAIN: 0
JOINT SWELLING: 0
DIARRHEA: 0
CHILLS: 0
HEADACHES: 0

## 2022-12-02 ASSESSMENT — ACTIVITIES OF DAILY LIVING (ADL): CURRENT_FUNCTION: TRANSPORTATION REQUIRES ASSISTANCE

## 2022-12-09 ENCOUNTER — OFFICE VISIT (OUTPATIENT)
Dept: FAMILY MEDICINE | Facility: CLINIC | Age: 87
End: 2022-12-09
Payer: COMMERCIAL

## 2022-12-09 VITALS
DIASTOLIC BLOOD PRESSURE: 68 MMHG | BODY MASS INDEX: 21.18 KG/M2 | TEMPERATURE: 98 F | OXYGEN SATURATION: 98 % | HEART RATE: 75 BPM | HEIGHT: 69 IN | SYSTOLIC BLOOD PRESSURE: 118 MMHG | RESPIRATION RATE: 18 BRPM | WEIGHT: 143 LBS

## 2022-12-09 DIAGNOSIS — Z86.16 HISTORY OF COVID-19: ICD-10-CM

## 2022-12-09 DIAGNOSIS — R73.03 PRE-DIABETES: ICD-10-CM

## 2022-12-09 DIAGNOSIS — G31.84 MCI (MILD COGNITIVE IMPAIRMENT): ICD-10-CM

## 2022-12-09 DIAGNOSIS — D64.9 ANEMIA, UNSPECIFIED TYPE: ICD-10-CM

## 2022-12-09 DIAGNOSIS — R29.818 DIFFICULTY BALANCING: ICD-10-CM

## 2022-12-09 DIAGNOSIS — Z85.46 HISTORY OF PROSTATE CANCER: ICD-10-CM

## 2022-12-09 DIAGNOSIS — F30.9 BIPOLAR I DISORDER, SINGLE MANIC EPISODE (H): ICD-10-CM

## 2022-12-09 DIAGNOSIS — Z12.5 SCREENING FOR PROSTATE CANCER: ICD-10-CM

## 2022-12-09 DIAGNOSIS — Z00.00 MEDICARE ANNUAL WELLNESS VISIT, SUBSEQUENT: Primary | ICD-10-CM

## 2022-12-09 LAB
ALBUMIN SERPL BCG-MCNC: 4.2 G/DL (ref 3.5–5.2)
ALP SERPL-CCNC: 69 U/L (ref 40–129)
ALT SERPL W P-5'-P-CCNC: 10 U/L (ref 10–50)
ANION GAP SERPL CALCULATED.3IONS-SCNC: 13 MMOL/L (ref 7–15)
AST SERPL W P-5'-P-CCNC: 30 U/L (ref 10–50)
BILIRUB SERPL-MCNC: 0.3 MG/DL
BUN SERPL-MCNC: 3.7 MG/DL (ref 8–23)
CALCIUM SERPL-MCNC: 9.1 MG/DL (ref 8.8–10.2)
CHLORIDE SERPL-SCNC: 100 MMOL/L (ref 98–107)
CREAT SERPL-MCNC: 0.73 MG/DL (ref 0.67–1.17)
DEPRECATED HCO3 PLAS-SCNC: 25 MMOL/L (ref 22–29)
ERYTHROCYTE [DISTWIDTH] IN BLOOD BY AUTOMATED COUNT: 13.3 % (ref 10–15)
GFR SERPL CREATININE-BSD FRML MDRD: 89 ML/MIN/1.73M2
GLUCOSE SERPL-MCNC: 91 MG/DL (ref 70–99)
HBA1C MFR BLD: 5.8 % (ref 0–5.6)
HCT VFR BLD AUTO: 38.3 % (ref 40–53)
HGB BLD-MCNC: 13.2 G/DL (ref 13.3–17.7)
MCH RBC QN AUTO: 32.9 PG (ref 26.5–33)
MCHC RBC AUTO-ENTMCNC: 34.5 G/DL (ref 31.5–36.5)
MCV RBC AUTO: 96 FL (ref 78–100)
PLATELET # BLD AUTO: 226 10E3/UL (ref 150–450)
POTASSIUM SERPL-SCNC: 4.6 MMOL/L (ref 3.4–5.3)
PROT SERPL-MCNC: 6.4 G/DL (ref 6.4–8.3)
PSA SERPL-MCNC: 0.23 NG/ML
RBC # BLD AUTO: 4.01 10E6/UL (ref 4.4–5.9)
SODIUM SERPL-SCNC: 138 MMOL/L (ref 136–145)
WBC # BLD AUTO: 5.7 10E3/UL (ref 4–11)

## 2022-12-09 PROCEDURE — 83036 HEMOGLOBIN GLYCOSYLATED A1C: CPT | Performed by: FAMILY MEDICINE

## 2022-12-09 PROCEDURE — G0439 PPPS, SUBSEQ VISIT: HCPCS | Performed by: FAMILY MEDICINE

## 2022-12-09 PROCEDURE — 80053 COMPREHEN METABOLIC PANEL: CPT | Performed by: FAMILY MEDICINE

## 2022-12-09 PROCEDURE — 85027 COMPLETE CBC AUTOMATED: CPT | Performed by: FAMILY MEDICINE

## 2022-12-09 PROCEDURE — G0103 PSA SCREENING: HCPCS | Performed by: FAMILY MEDICINE

## 2022-12-09 PROCEDURE — 36415 COLL VENOUS BLD VENIPUNCTURE: CPT | Performed by: FAMILY MEDICINE

## 2022-12-09 ASSESSMENT — PAIN SCALES - GENERAL: PAINLEVEL: NO PAIN (0)

## 2022-12-09 ASSESSMENT — ACTIVITIES OF DAILY LIVING (ADL)
CONCENTRATING,_REMEMBERING_OR_MAKING_DECISIONS_DIFFICULTY: NO
DIFFICULTY_COMMUNICATING: NO
HEARING_DIFFICULTY_OR_DEAF: NO
TOILETING_ISSUES: NO
CHANGE_IN_FUNCTIONAL_STATUS_SINCE_ONSET_OF_CURRENT_ILLNESS/INJURY: NO
FALL_HISTORY_WITHIN_LAST_SIX_MONTHS: NO
DIFFICULTY_EATING/SWALLOWING: NO
EQUIPMENT_CURRENTLY_USED_AT_HOME: WALKER, ROLLING
WEAR_GLASSES_OR_BLIND: YES
WALKING_OR_CLIMBING_STAIRS_DIFFICULTY: NO
DOING_ERRANDS_INDEPENDENTLY_DIFFICULTY: NO
DRESSING/BATHING_DIFFICULTY: NO

## 2022-12-09 NOTE — PROGRESS NOTES
SUBJECTIVE:   Patrick is a 87 year old who presents for Preventive Visit.  Patient has been advised of split billing requirements and indicates understanding: Yes  Are you in the first 12 months of your Medicare coverage?  No    Patrikc is an 87-year-old man who is here today for a annual wellness visit.  He has bipolar disorder, cognitive impairment under the care of neurology, chronic anemia and recent history of shoulder arthroplasty.  He has prediabetes his last A1c was 5.3 but he has has been as high as 6.0 in the past.  He has a history of prostate cancer that was treated in the distant past.  He has had stable PSA readings detected at 0.1 up to 0.2 over the past 6 years.  This has been stable without any further increase.  We have been monitoring but have not taken other specific action.    In late October 2022 he was diagnosed with COVID.  He was started on Paxil again on on 28 October.  He had some difficulties at home including difficulty balancing and caring for himself, his wife was ill at the same time.  We had a visit where we discussed this in some depth on November 8.  We arranged for home care.  He had had several falls at home prior to our conversation but has had none since.  He is using a walker for ambulation and undergoing home physical therapy.  He reports this is going well.  His wife additionally notes that his mental health has improved significantly with having more social interaction from the home care and physical therapy team.  We discussed the importance of trying to find ways to keep his social activity up after home care ends.  He has some ideas of how to do this already.  He wants his children to be involved in communicating with this office when needed for his health care.  I previously spoken with one of his daughters.    Today we also reviewed his healthcare directive.    This past spring he had shoulder replacement surgery it went well and he is doing well in his recovery and reports  "no concerns.    Have you ever done Advance Care Planning? (For example, a Health Directive, POLST, or a discussion with a medical provider or your loved ones about your wishes):     Cognitive Screening   1) Repeat 3 items 3/3   2) Clock draw: 100%Answers for HPI/ROS submitted by the patient on 12/2/2022  In general, how would you rate your overall physical health?: good  Frequency of exercise:: 1 day/week  Do you usually eat at least 4 servings of fruit and vegetables a day, include whole grains & fiber, and avoid regularly eating high fat or \"junk\" foods? : No  Taking medications regularly:: Yes  Medication side effects:: None  Activities of Daily Living: transportation requires assistance  Home safety: no safety concerns identified  Hearing Impairment:: no hearing concerns  In the past 6 months, have you been bothered by leaking of urine?: No  In general, how would you rate your overall mental or emotional health?: good  Additional concerns today:: No      3) 3 item recall: Recalls 3 objects  Results: 3 items recalled: COGNITIVE IMPAIRMENT LESS LIKELY    Mini-CogTM Copyright SILVIA Cabello. Licensed by the author for use in [facility] [employer]; reprinted with permission (sodevin@[facility]). All rights reserved.      Do you have sleep apnea, excessive snoring or daytime drowsiness?: no    Reviewed and updated as needed this visit by clinical staff    Reviewed and updated as needed this visit by Provider    Social History     Tobacco Use     Smoking status: Never     Smokeless tobacco: Never   Substance Use Topics     Alcohol use: No     If you drink alcohol do you typically have >3 drinks per day or >7 drinks per week? No    No flowsheet data found.    Current providers sharing in care for this patient include:  Patient Care Team:  Selvin Riley MD as PCP - General (Family Practice)  Ramon Rene MD as Assigned Neuroscience Provider  Selvin Riley MD as Assigned PCP    The following health maintenance " "items are reviewed in Epic and correct as of today:  Health Maintenance   Topic Date Due     ANNUAL REVIEW OF HM ORDERS  Never done     COVID-19 Vaccine (4 - Booster for Moderna series) 01/18/2022     MEDICARE ANNUAL WELLNESS VISIT  10/05/2022     FALL RISK ASSESSMENT  12/09/2023     ADVANCE CARE PLANNING  10/10/2026     DTAP/TDAP/TD IMMUNIZATION (2 - Td or Tdap) 11/24/2027     PHQ-2 (once per calendar year)  Completed     INFLUENZA VACCINE  Completed     Pneumococcal Vaccine: 65+ Years  Completed     ZOSTER IMMUNIZATION  Completed     IPV IMMUNIZATION  Aged Out     MENINGITIS IMMUNIZATION  Aged Out               Review of Systems  Complete review of systems is obtained.  Other than the specific considerations noted above complete review of systems is negative.      OBJECTIVE:   /68   Pulse 75   Temp 98  F (36.7  C)   Resp 18   Ht 1.753 m (5' 9\")   Wt 64.9 kg (143 lb)   SpO2 98%   BMI 21.12 kg/m   Estimated body mass index is 21.12 kg/m  as calculated from the following:    Height as of this encounter: 1.753 m (5' 9\").    Weight as of this encounter: 64.9 kg (143 lb).  Physical Exam          General Appearance:    Alert, cooperative, no distress   Eyes:   No scleral icterus or conjunctival irritation       Ears:   Bilateral ceruminosis   Throat:   Lips, mucosa, and tongue normal; teeth and gums normal   Neck:   Supple, symmetrical, trachea midline, no adenopathy;        thyroid:  No enlargement/tenderness/nodules   Lungs:     Clear to auscultation bilaterally, respirations unlabored, no wheezes or crackles   Heart:    Regular rate and rhythm,  No murmur   Abdomen:    Soft, no distention, no tenderness on palpation, no masses, no organomegaly     Extremities:  No edema, no joint swelling or redness, no evidence of any injuries   Skin:  No concerning skin findings, no suspicious moles, no rashes   Neurologic:  On gross examination there is no motor or sensory deficit.                   ASSESSMENT / PLAN: "   Patrick was seen today for recheck medication and wellness visit.    Diagnoses and all orders for this visit:    Medicare annual wellness visit, subsequent    MCI (mild cognitive impairment)    Bipolar I disorder, single manic episode (H)  -     Comprehensive metabolic panel (BMP + Alb, Alk Phos, ALT, AST, Total. Bili, TP)    Pre-diabetes  -     Hemoglobin A1c    Anemia, unspecified type  -     CBC with platelets    Screening for prostate cancer  -     PSA, screen    Difficulty balancing    History of prostate cancer    History of COVID-19           Patient has been advised of split billing requirements and indicates understanding: Yes      COUNSELING:  Reviewed preventive health counseling, as reflected in patient instructions       Regular exercise       Healthy diet/nutrition       Vision screening       Hearing screening       Prostate cancer screening        He reports that he has never smoked. He has never used smokeless tobacco.      Appropriate preventive services were discussed with this patient, including applicable screening as appropriate for cardiovascular disease, diabetes, osteopenia/osteoporosis, and glaucoma.  As appropriate for age/gender, discussed screening for colorectal cancer, prostate cancer, breast cancer, and cervical cancer. Checklist reviewing preventive services available has been given to the patient.    Reviewed patients plan of care and provided an AVS. The Basic Care Plan (routine screening as documented in Health Maintenance) for Patrick meets the Care Plan requirement. This Care Plan has been established and reviewed with the Patient.      Selvin Riley MD, MD  [facility]    Identified Health Risks:

## 2022-12-14 ENCOUNTER — TELEPHONE (OUTPATIENT)
Dept: FAMILY MEDICINE | Facility: CLINIC | Age: 87
End: 2022-12-14

## 2022-12-14 NOTE — TELEPHONE ENCOUNTER
Reason for Call:  Order    Detailed comments: Patient's daughter Jessica called. Consent to Communicate on file. She is inquiring about getting [employer] life alert type bracelet or necklace.     Please place order if appropriate.    Phone Number can be reached at: [phone]    Can we leave a detailed message on this number? YES    Call taken on 12/14/2022 at 10:05 AM by Keri Ram

## 2022-12-15 ENCOUNTER — TELEPHONE (OUTPATIENT)
Dept: FAMILY MEDICINE | Facility: CLINIC | Age: 87
End: 2022-12-15

## 2022-12-15 NOTE — TELEPHONE ENCOUNTER
Skilled nursing is requesting social work orders eval on fall alert companies and local support groups for pt. Please call with orders and fax to [phone] Attn: Mindy

## 2023-01-04 ENCOUNTER — TELEPHONE (OUTPATIENT)
Dept: FAMILY MEDICINE | Facility: CLINIC | Age: 88
End: 2023-01-04

## 2023-01-04 NOTE — TELEPHONE ENCOUNTER
Reason for Call:  Home Health Care    Daniella with [facility] called regarding (reason for call): verbal orders    Orders are needed for this patient. PT    PT: Pt is declining/ refusing discharge visit due to many other appointments. Rescheduled to next week for discharge visit.     Pt Provider: Dr. Riley     Phone Number Homecare Nurse can be reached at: [phone]    Can we leave a detailed message on this number? YES    FYI: Verbal orders given per . Please notify us if any adjustments need to be made.

## 2023-01-12 ENCOUNTER — TRANSFERRED RECORDS (OUTPATIENT)
Dept: HEALTH INFORMATION MANAGEMENT | Facility: CLINIC | Age: 88
End: 2023-01-12

## 2023-01-26 ENCOUNTER — TELEPHONE (OUTPATIENT)
Dept: FAMILY MEDICINE | Facility: CLINIC | Age: 88
End: 2023-01-26
Payer: COMMERCIAL

## 2023-01-26 NOTE — TELEPHONE ENCOUNTER
[facility] called to let us know that pt's plan of care dated 11/13/22 had incorrect information. They corrected it and need a new copy signed. Placed in provider's folder.

## 2023-02-17 ENCOUNTER — TELEPHONE (OUTPATIENT)
Dept: FAMILY MEDICINE | Facility: CLINIC | Age: 88
End: 2023-02-17
Payer: COMMERCIAL

## 2023-02-17 NOTE — TELEPHONE ENCOUNTER
Patient don't think diarrhea is a problem. Just took too much Ex-Lax and that he had learned his lesson and will cut back on the Ex-Lax as he is taking colace too. He is cutting out on the Metamucil fiber and will only be taking colace and Ex-Lax but will not take it as much anymore. Pt stated that he is only eatting one meal a day on purpose d/t high hgb A1C. He was trying to lose some weight and prevent himself from getting diabetes.     At first pt denied falling but then he said he was able to get right up without hurting anything. Denied hitting head or any body parts. Patient stated he is using a walker.    Patient stated he feels fine and declined making any appts or to be seen. He stated that he will make an appt in a few months but he is not having any issues now and the diarrhea was not a problem.    Informed daughter Vaishali that writer reached out to father and he declined scheduling appt but stated he feels fine and learned his lesson from taking too much Ex-Lax. Daughter advised to call back if he continues to have ongoing diarrhea or worsening as he may continue to take Ex-Lax even though he said he will cut back.    Anil Freeman, RISHABHN, RN  [facility]

## 2023-02-17 NOTE — TELEPHONE ENCOUNTER
Nurse Triage SBAR    Is this a 2nd Level Triage? YES, LICENSED PRACTITIONER REVIEW IS REQUIRED    Situation:   Daughter Vaishali (CTC on file) is concerned about pt taking Ex-Lax 2-3 tablets daily for a couple months now. Patient has been having diarrhea ongoing for a few months as he is also taking one packet of Metamucil fiber daily too.    Wife had tried to talk pt out of taking Ex-lax and Metamucil fiber and will hid them but pt will just go buy more.    Background:   Ongoing diarrhea per daughter for a couple of months.    Assessment:   Patient is not currently with daughter Vaishali.     Daughter is concerned about pt taking Ex-Lax 2-3 tablets daily for a couple months now. Patient has been having diarrhea ongoing for a few months as he is also taking one packet of Metamucil fiber daily too.     Per daughter, pt was having vomiting and diarrhea yesterday and also fell. She stated that pt stated he feels fine after the fall and does not think patient needs to be assessed at this point.    She is just really concerned about his ongoing diarrhea with taking Ex-lax and Metamucil fiber daily. He is not even eating much and still taking them since he thinks he is constipated. Per daughter, pt has dementia, so it is hard to talk to him and make him understand.     Not eating much- ate a stuffed mushroom and some ice cream yesterday only for all day and only popcorns Sunday.    Hard to get patient to agree to coming in to be seen. Daughter was wondering what PCP would advise and probably if PCP or staff at clinic call pt with advices that he may listen to it and follow it verses coming from daughter or his wife.    Protocol Recommended Disposition:   No disposition on file.    Recommendation:   Advised daughter to have pt come in and be seen. Daughter declined scheduling at this time stating that it is hard to get pt to agree to coming in and be seen.      She was wondering if provider has any advices for pt and if pcp or  staff at clinic could call pt with recommendations/advices to see if he will follow them.    Please call both pt and daughter to let them know the plan of care.    Routed to provider    Does the patient meet one of the following criteria for ADS visit consideration? 16+ years old, with an [facility] PCP     TIP  Providers, please consider if this condition is appropriate for management at one of our Acute and Diagnostic Services sites.     If patient is a good candidate, please use dotphrase <dot>triageresponse and select Refer to ADS to document.    Anil Freeman, RISHABHN, RN  [facility]

## 2023-03-08 NOTE — PROGRESS NOTES
In person evaluation    HPI  January 1, 2020, in person consultation transfer of care from Dr. Vikas Mcginnis  4/27/2020, telephone visit  8/3/2020, telephone visit  11/6/2020, video visit  8/13/2021, in person visit  2/16/2022, in person visit  8/19/2022, in person visit  3/13/2023, in person visit        87-year-old followed neurologically for:  Cognitive decline (memory loss)  Onset as far back as 2015  Difficulty remembering people's names  Previously cared for by Dr. Vikas Mcginnis   history of bipolar disorder  Abnormal EEG    Since last seen  No hospitalizations  No surgeries  No major illnesses except COVID  No falls    Only eats 1 meal a day but that is his choice    No hallucinations no vivid dreams    Walks okay with the 4 wheeled walker with hand break  Family thought that maybe he fatigues with the 25-minute walk  Back last October 2022 had some COVID and was wheelchair-bound for a while  He is actually improved quite a bit       Mirtazapine 30 mg at nighttime to help with sleep prescribed by other physicians    Forgetfulness is about the same  Does use a four-wheel walker with hand break discussed gait safety    Patient does better when he can read lips but with everybody with the mask I make things difficult          A.  Mild cognitive impairment       Onset as far back as 2015       Trouble remembering people's names       Past abnormal EEG       Previously cared for by Dr. Vikas Mcginnis       Mother had some dementia the last year of life when she lived to be 90       Scored a 23 out of 30, (8/13/2021)       Scored a 25 out of 30, (January 2020)       Subtle change over a year and a half         We discussed cognitive decline       Exelon 3 mg 1 tab p.o. twice daily      Depakote 250 mg 1 p.o. nightly        No GERD no diarrhea no lightheadedness no black tarry stools no blood in the stool      Does have severe hearing loss at baseline      B.  Has history of bipolar disorder        Neurologic review from  original visit August 2021  Patient feels that he has had a falloff in his memory has more trouble tracking and following conversations  Used to go to a group with his retired friends from [employer] but they cannot get together due to Covid  Has severe hearing loss in both ears  Hearing doctor thought that they could maybe make a hearing aid that would work on the left ear but the not sure  With the mask on and talking to him he misses a lot of the conversation though  I am sure this is causing some difficulty with his memory recall and tracking conversations    Patient is still coherent can should chat about multiple topics  Can jump from topic to topic fairly easy  Does better when we talk about more things from the past  Scored a 23 out of 30, (8/13/2021)  Scored a 25 out of 30, (January 2020)  Subtle change over a year and a half        Neurologic summary:  Previously followed by Dr. Vikas Mcginnis  Mild cognitive impairment as far back as 2015  Patient works for [employer] as a   Retired in 1998  [facility] testing January 2020, 25 out of 30  Seems to have difficulty remembering people's names  Has significant hearing loss right greater than left left is not enough to be helped by hearing aid  Past history of bipolar disorder  Mother with dementia in the last year of her life at age 90  Uncle last year of life age 88 developed dementia  In the distant past of been on Depakote for 2 to 3 years for bipolar disorder  Has an abnormal EEG  Patient restarted on Depakote 2020  Talked about the pros and cons of using this to maybe stabilize the temporal lobe see if his symptoms are little less severe may be slow down the progression  His daughter has depression and is on Depakote    Patient tends to be quite active rows or exercises on the exercise bike regularly  Watches his diet hemoglobin A1c's have been good  Tries to stay active volunteers and participates in activities      Past medical history  Mild cognitive impairment  Bipolar  disorder  Prostate cancer  Benign prostatic hypertrophy  Seborrheic dermatitis  Osteoarthritis with shoulder surgery  Left leg edema    Habits   Non-smoker  Does not drink alcohol  Worked as a [employer]  retired in   Lives in seniors apartment with elevator  Does exercise regularly stays active      Family history  Mother had glaucoma congestive heart failure dementia  about age 90  Father  at 34 with testicular cancer  He has 1 sister with some diabetes  Daughter with depression treated with Depakote  Uncle with difficulty with memory at the age of 88 last year of his life      Work-up review  MRI scan brain 2016 mild to moderate atrophy progressed mildly from   Laboratory data 2019  Sodium 140 potassium 4.9 BUN 8 creatinine 0.8 glucose 94  White blood count 6.3 hemoglobin 13.2 platelets 313,000  Hemoglobin A1c 5.8%  Outside records from Dr. Mcginnis 2018, 2018 reviewed  EEG 2020 rare F7/T3 sharp waves with RAINE activity with hyperventilation mild to moderately abnormal  B12 689 ,TSH 1.57, (2020)    [facility]  2020,    25 out of 30   2021,   23 out of 30   2022,   22 out of 30  3/13/2023,    24 out of 30      Laboratory data review                      2022  NA/K             138/4.6    BUN/Cr          3.7/0.73    GLU               91    AST               30    WBC/HGB     5.7/13.2    PLTs              226,000    HGBA1C        5.8     review of systems    Chronic hearing loss seems to be worse than last time  Patient wearing the mask so it is difficult to communicate as he cannot read her lips    No headache no chest pain or shortness of breath no nausea vomiting no diarrhea no fever chills no abdominal pain no cough no sore throat    No diplopia dysarthria dysphagia  No focal weakness  No tremor  Gait is good    Has terrible hearing     Has impotence due to prostate hypertrophy and history of prostate cancer    Otherwise review systems  negative    General exam  Alert oriented x3   blood pressure 104/67, pulse 67  HEENT significant for hearing loss in left ear and right ear poor hearing and has a hearing aid  Lungs clear  Abdomen soft  No edema the feet  Patient fan only eats 1 meal per day    Neurologic exam  Alert orient x3  Normal prosody speech  Normal naming  Normal comprehension  Normal repetition  No aphasia  No neglect  3/13/2023,    24 out of 30      Cranial 2 through 12 are significant for  Deaf in left ear significant hearing loss of the other  No ophthalmoplegia  No nystagmus  Face symmetrical  Tongue twisters okay  Visual fields intact    Upper extremities  No drift no tremor normal finger-nose    Lower extremities  Distal proximal strength good    Gait  Gets up pushing off with his hands uses a four-wheel walker  Discussed gait safety  Has somewhat of a flexed posture          Assessment/Plan     1.  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)        Mild cognitive impairment      Onset as far back as 2015       Family history of dementia in the later years mom and uncle       History of bipolar disorder       Abnormal EEG F7/T3 sharp wave         Depakote 250 mg 1 tablet nightly       Exelon 3 mg capsule twice daily (added 8/13/2021)    Tolerating medication  Follow-up October 2023    Discussed the pros and cons of trying a medication to help prevent further progression of difficulties   discussed with patient and wife    Discussed gait safety      We talked about different approaches and whether he should start some medication  Eats 1 meal a day discussed important to have good nutrition  Does exercise though regularly and stays active       Continue with the Depakote low-dose      Symptoms and signs seem relatively stable  No side effect of medication  Discussed the above also with patient and wife    Patient accompanied by his wife and daughter and there was another daughter that was on the phone    Total care time  today 32 minutes    As part of visit today  Reviewed primary MD note 12/9/2022  Reviewed laboratory data 12/20/2022

## 2023-03-13 ENCOUNTER — OFFICE VISIT (OUTPATIENT)
Dept: NEUROLOGY | Facility: CLINIC | Age: 88
End: 2023-03-13
Payer: COMMERCIAL

## 2023-03-13 VITALS
HEIGHT: 69 IN | HEART RATE: 67 BPM | WEIGHT: 143 LBS | DIASTOLIC BLOOD PRESSURE: 67 MMHG | SYSTOLIC BLOOD PRESSURE: 104 MMHG | BODY MASS INDEX: 21.18 KG/M2

## 2023-03-13 DIAGNOSIS — G31.84 MCI (MILD COGNITIVE IMPAIRMENT): Primary | ICD-10-CM

## 2023-03-13 PROCEDURE — 99214 OFFICE O/P EST MOD 30 MIN: CPT | Performed by: PSYCHIATRY & NEUROLOGY

## 2023-03-13 RX ORDER — DIVALPROEX SODIUM 250 MG/1
250 TABLET, DELAYED RELEASE ORAL DAILY
Qty: 90 TABLET | Refills: 3 | Status: SHIPPED | OUTPATIENT
Start: 2023-03-13 | End: 2023-07-11

## 2023-03-13 RX ORDER — RIVASTIGMINE TARTRATE 3 MG/1
3 CAPSULE ORAL 2 TIMES DAILY
Qty: 180 CAPSULE | Refills: 3 | Status: SHIPPED | OUTPATIENT
Start: 2023-03-13 | End: 2023-07-11

## 2023-03-13 ASSESSMENT — MONTREAL COGNITIVE ASSESSMENT (MOCA)
4. NAME EACH OF THE THREE ANIMALS SHOWN: 3
9. REPEAT EACH SENTENCE: 2
WHAT LEVEL OF EDUCATION WAS ATTAINED: 0
13. ORIENTATION SUBSCORE: 4
11. FOR EACH PAIR OF WORDS, WHAT CATEGORY DO THEY BELONG TO (OUT OF 2): 2
8. SERIAL SUBTRACTION OF 7S: 3
VISUOSPATIAL/EXECUTIVE SUBSCORE: 5
7. [VIGILENCE] TAP WHEN HEARING DESIGNATED LETTER: 1
10. [FLUENCY] NAME WORDS STARTING WITH DESIGNATED LETTER: 1
WHAT IS THE TOTAL SCORE (OUT OF 30): 24
12. MEMORY INDEX SCORE: 1
6. READ LIST OF DIGITS [FORWARD/BACKWARD]: 2

## 2023-03-13 NOTE — NURSING NOTE
JULIAN COGNITIVE ASSESSMENT ([facility])  Version 7.1 Original Version  VISUOSPATIAL/EXECUTIVE               COPY CUBE      [  1  ]                                [1    ] DRAW CLOCK (Ten past eleven)  (3 points)    [  1  ]                    [ 1   ]               [ 1   ]       Contour            Numbers     Hands POINTS                  5 / 5   NAMING    [ 1  ]                                                                        [  1  ]                                             [ 1   ]  Lishabnam Hickey                                Camel                    3 / 3   MEMORY Read list of words, subject must repeat them. Do 2 trials, even if 1st trial is successful. Do a recall after 5 minutes  FACE VELVET Baptism FIDEL RED No Points    1st          2nd         ATTENTION Read list of digits (1 digit/sec) Subject has to repeat in the forward order       [1    ]   2  1  8  5  4                                [  1  ] 7 4 2                         2 /2   Read list of letters. The subject must tap with his hand at each letter A. No points if > 2 errors.  [ 1   ] F B A C M N A A J K L B A F A K D E A A A J A M O F A A B             1 /1   Serial 7 subtraction starting at 100          [  1  ] 93         [  1  ] 86          [1    ] 79          [ 1   ] 72         [  0  ] 65   4 or 5 correct subtractions: 3 points,  2 or 3 correct: 2 points,  1correct: 1 point,   0 correct: 0 points           3 /3   LANGUAGE Repeat: I only know that Patrick is the one to help today. [  1   ]                                      The cat always hid under the couch when dogs were in the room. [ 1  ]              2 /2   Fluency: Name maximum number of words in one minute that begin with the letter F                                                                                                                    [ 1   ] _13__ (N > 11 words)              1 /1   ABSTRACTION Similarity  between e.g. banana-orange=fruit                                                                   [  1  ] train-bicycle                      [   1 ] watch-ruler             2 /2   DELAYED  RECALL Has to recall words  WITH NO CUE FACE  [ 0   ] VELVET  [ 0   ] Jewish  [ 1   ]  FIDEL  [  0  ] RED  [ 0   ] Points for UNCUED recall only            1/5           OPTIONAL Category cue           Multiple choice cue          ORIENTATION  [  0  ] Date     [   1 ] Month       [  1  ] Year      [   0 ] Day      [  1  ] Place        [  1  ] City          4/6   TOTAL  Normal > 26/30 Add 1 point if < 12 years education       24 /30

## 2023-03-13 NOTE — NURSING NOTE
Chief Complaint   Patient presents with     Memory Loss     7 month follow up. Pt states he has noticed memory decline.     Rolanda Lomeli LPN on 3/13/2023 at 1:46 PM

## 2023-03-13 NOTE — LETTER
3/13/2023         RE: Patrick Wharton  [address]  [place] [address]        Dear Colleague,    Thank you for referring your patient, Patrick Wharton, to the [facility] [place]. Please see a copy of my visit note below.    In person evaluation    HPI  January 1, 2020, in person consultation transfer of care from Dr. Vikas Mcginnis  4/27/2020, telephone visit  8/3/2020, telephone visit  11/6/2020, video visit  8/13/2021, in person visit  2/16/2022, in person visit  8/19/2022, in person visit  3/13/2023, in person visit        87-year-old followed neurologically for:  Cognitive decline (memory loss)  Onset as far back as 2015  Difficulty remembering people's names  Previously cared for by Dr. Vikas Mcginnis   history of bipolar disorder  Abnormal EEG    Since last seen  No hospitalizations  No surgeries  No major illnesses except COVID  No falls    Only eats 1 meal a day but that is his choice    No hallucinations no vivid dreams    Walks okay with the 4 wheeled walker with hand break  Family thought that maybe he fatigues with the 25-minute walk  Back last October 2022 had some COVID and was wheelchair-bound for a while  He is actually improved quite a bit       Mirtazapine 30 mg at nighttime to help with sleep prescribed by other physicians    Forgetfulness is about the same  Does use a four-wheel walker with hand break discussed gait safety    Patient does better when he can read lips but with everybody with the mask I make things difficult          A.  Mild cognitive impairment       Onset as far back as 2015       Trouble remembering people's names       Past abnormal EEG       Previously cared for by Dr. Vikas Mcginnis       Mother had some dementia the last year of life when she lived to be 90       Scored a 23 out of 30, (8/13/2021)       Scored a 25 out of 30, (January 2020)       Subtle change over a year and a half         We discussed cognitive decline       Exelon 3 mg 1 tab  p.o. twice daily      Depakote 250 mg 1 p.o. nightly        No GERD no diarrhea no lightheadedness no black tarry stools no blood in the stool      Does have severe hearing loss at baseline      B.  Has history of bipolar disorder        Neurologic review from original visit August 2021  Patient feels that he has had a falloff in his memory has more trouble tracking and following conversations  Used to go to a group with his retired friends from [employer] but they cannot get together due to Covid  Has severe hearing loss in both ears  Hearing doctor thought that they could maybe make a hearing aid that would work on the left ear but the not sure  With the mask on and talking to him he misses a lot of the conversation though  I am sure this is causing some difficulty with his memory recall and tracking conversations    Patient is still coherent can should chat about multiple topics  Can jump from topic to topic fairly easy  Does better when we talk about more things from the past  Scored a 23 out of 30, (8/13/2021)  Scored a 25 out of 30, (January 2020)  Subtle change over a year and a half        Neurologic summary:  Previously followed by Dr. Vikas Mcginnis  Mild cognitive impairment as far back as 2015  Patient works for [employer] as a   Retired in 1998  [facility] testing January 2020, 25 out of 30  Seems to have difficulty remembering people's names  Has significant hearing loss right greater than left left is not enough to be helped by hearing aid  Past history of bipolar disorder  Mother with dementia in the last year of her life at age 90  Uncle last year of life age 88 developed dementia  In the distant past of been on Depakote for 2 to 3 years for bipolar disorder  Has an abnormal EEG  Patient restarted on Depakote 2020  Talked about the pros and cons of using this to maybe stabilize the temporal lobe see if his symptoms are little less severe may be slow down the progression  His daughter has depression and is on  Depakote    Patient tends to be quite active rows or exercises on the exercise bike regularly  Watches his diet hemoglobin A1c's have been good  Tries to stay active volunteers and participates in activities      Past medical history  Mild cognitive impairment  Bipolar disorder  Prostate cancer  Benign prostatic hypertrophy  Seborrheic dermatitis  Osteoarthritis with shoulder surgery  Left leg edema    Habits   Non-smoker  Does not drink alcohol  Worked as a [employer]  retired in   Lives in seniors apartment with elevator  Does exercise regularly stays active      Family history  Mother had glaucoma congestive heart failure dementia  about age 90  Father  at 34 with testicular cancer  He has 1 sister with some diabetes  Daughter with depression treated with Depakote  Uncle with difficulty with memory at the age of 88 last year of his life      Work-up review  MRI scan brain 2016 mild to moderate atrophy progressed mildly from   Laboratory data 2019  Sodium 140 potassium 4.9 BUN 8 creatinine 0.8 glucose 94  White blood count 6.3 hemoglobin 13.2 platelets 313,000  Hemoglobin A1c 5.8%  Outside records from Dr. Mcginnis 2018, 2018 reviewed  EEG 2020 rare F7/T3 sharp waves with RAINE activity with hyperventilation mild to moderately abnormal  B12 689 ,TSH 1.57, (2020)    [facility]  2020,    25 out of 30   2021,   23 out of 30   2022,   22 out of 30  3/13/2023,    24 out of 30      Laboratory data review                      2022  NA/K             138/4.6    BUN/Cr          3.7/0.73    GLU               91    AST               30    WBC/HGB     5.7/13.2    PLTs              226,000    HGBA1C        5.8     review of systems    Chronic hearing loss seems to be worse than last time  Patient wearing the mask so it is difficult to communicate as he cannot read her lips    No headache no chest pain or shortness of breath no nausea vomiting no diarrhea no  fever chills no abdominal pain no cough no sore throat    No diplopia dysarthria dysphagia  No focal weakness  No tremor  Gait is good    Has terrible hearing     Has impotence due to prostate hypertrophy and history of prostate cancer    Otherwise review systems negative    General exam  Alert oriented x3   blood pressure 104/67, pulse 67  HEENT significant for hearing loss in left ear and right ear poor hearing and has a hearing aid  Lungs clear  Abdomen soft  No edema the feet  Patient fan only eats 1 meal per day    Neurologic exam  Alert orient x3  Normal prosody speech  Normal naming  Normal comprehension  Normal repetition  No aphasia  No neglect  3/13/2023,    24 out of 30      Cranial 2 through 12 are significant for  Deaf in left ear significant hearing loss of the other  No ophthalmoplegia  No nystagmus  Face symmetrical  Tongue twisters okay  Visual fields intact    Upper extremities  No drift no tremor normal finger-nose    Lower extremities  Distal proximal strength good    Gait  Gets up pushing off with his hands uses a four-wheel walker  Discussed gait safety  Has somewhat of a flexed posture          Assessment/Plan     1.  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)        Mild cognitive impairment      Onset as far back as 2015       Family history of dementia in the later years mom and uncle       History of bipolar disorder       Abnormal EEG F7/T3 sharp wave         Depakote 250 mg 1 tablet nightly       Exelon 3 mg capsule twice daily (added 8/13/2021)    Tolerating medication  Follow-up October 2023    Discussed the pros and cons of trying a medication to help prevent further progression of difficulties   discussed with patient and wife    Discussed gait safety      We talked about different approaches and whether he should start some medication  Eats 1 meal a day discussed important to have good nutrition  Does exercise though regularly and stays active       Continue with the  Depakote low-dose      Symptoms and signs seem relatively stable  No side effect of medication  Discussed the above also with patient and wife    Patient accompanied by his wife and daughter and there was another daughter that was on the phone    Total care time today 32 minutes    As part of visit today  Reviewed primary MD note 12/9/2022  Reviewed laboratory data 12/20/2022          Again, thank you for allowing me to participate in the care of your patient.        Sincerely,        Ramon Rene MD

## 2023-04-25 ENCOUNTER — DOCUMENTATION ONLY (OUTPATIENT)
Dept: OTHER | Facility: CLINIC | Age: 88
End: 2023-04-25
Payer: COMMERCIAL

## 2023-07-11 DIAGNOSIS — G31.84 MCI (MILD COGNITIVE IMPAIRMENT): ICD-10-CM

## 2023-07-11 RX ORDER — RIVASTIGMINE TARTRATE 3 MG/1
3 CAPSULE ORAL 2 TIMES DAILY
Qty: 180 CAPSULE | Refills: 1 | Status: SHIPPED | OUTPATIENT
Start: 2023-07-11 | End: 2023-11-03

## 2023-07-11 RX ORDER — DIVALPROEX SODIUM 250 MG/1
250 TABLET, DELAYED RELEASE ORAL DAILY
Qty: 90 TABLET | Refills: 1 | Status: SHIPPED | OUTPATIENT
Start: 2023-07-11 | End: 2023-11-03

## 2023-07-11 NOTE — TELEPHONE ENCOUNTER
Refill request for:   rivastigmine (EXELON) 3 MG capsule- Take 1 capsule (3 mg) by mouth 2 times daily  divalproex sodium delayed-release (DEPAKOTE) 250 MG DR tablet- Take 1 tablet (250 mg) by mouth daily     LOV: 3/13/23  NOV: 11/3/23    90 day supply with 1 refills Medication T'd for review and signature  Francine Martinez CMA on 7/11/2023 at 4:13 PM

## 2023-07-11 NOTE — TELEPHONE ENCOUNTER
[facility] Call Center    Phone Message    May a detailed message be left on voicemail: no     Reason for Call: Medication Refill Request    Has the patient contacted the pharmacy for the refill? Yes     Name of medication being requested:  divalproex sodium delayed-release (DEPAKOTE) 250 MG DR tablet     rivastigmine (EXELON)    Provider who prescribed the medication:  Ramon Rene    Pharmacy: Mail order pharmacy  [facility] pharmacy:  # [phone]    Fax # [phone]      Date medication is needed: Next available  Per patient no rush but will need refill soon.       Action Taken: Other: [facility] neurology    Travel Screening: Not Applicable

## 2023-09-29 ENCOUNTER — OFFICE VISIT (OUTPATIENT)
Dept: FAMILY MEDICINE | Facility: CLINIC | Age: 88
End: 2023-09-29
Payer: COMMERCIAL

## 2023-09-29 VITALS
RESPIRATION RATE: 16 BRPM | TEMPERATURE: 98.5 F | DIASTOLIC BLOOD PRESSURE: 60 MMHG | BODY MASS INDEX: 21.55 KG/M2 | SYSTOLIC BLOOD PRESSURE: 92 MMHG | OXYGEN SATURATION: 96 % | HEART RATE: 74 BPM | WEIGHT: 145.9 LBS

## 2023-09-29 DIAGNOSIS — R60.0 PEDAL EDEMA: Primary | ICD-10-CM

## 2023-09-29 DIAGNOSIS — Z85.46 HISTORY OF PROSTATE CANCER: ICD-10-CM

## 2023-09-29 DIAGNOSIS — R73.03 PRE-DIABETES: ICD-10-CM

## 2023-09-29 DIAGNOSIS — F03.B0 MODERATE DEMENTIA WITHOUT BEHAVIORAL DISTURBANCE, PSYCHOTIC DISTURBANCE, MOOD DISTURBANCE, OR ANXIETY, UNSPECIFIED DEMENTIA TYPE (H): ICD-10-CM

## 2023-09-29 PROCEDURE — 99214 OFFICE O/P EST MOD 30 MIN: CPT | Mod: 25 | Performed by: FAMILY MEDICINE

## 2023-09-29 PROCEDURE — 90662 IIV NO PRSV INCREASED AG IM: CPT | Performed by: FAMILY MEDICINE

## 2023-09-29 PROCEDURE — G0008 ADMIN INFLUENZA VIRUS VAC: HCPCS | Performed by: FAMILY MEDICINE

## 2023-09-29 RX ORDER — MODERNA COVID-19 VACCINE, BIVALENT 25; 25 UG/.5ML; UG/.5ML
INJECTION, SUSPENSION INTRAMUSCULAR
COMMUNITY
Start: 2023-01-14 | End: 2023-09-29

## 2023-09-29 ASSESSMENT — PAIN SCALES - GENERAL: PAINLEVEL: NO PAIN (0)

## 2023-09-29 NOTE — PROGRESS NOTES
Patrick Wharton  BP 92/60 (BP Location: Left arm, Patient Position: Sitting, Cuff Size: Adult Regular)   Pulse 74   Temp 98.5  F (36.9  C) (Temporal)   Resp 16   Wt 66.2 kg (145 lb 14.4 oz)   SpO2 96%   BMI 21.55 kg/m       Assessment/Plan:                Patrick was seen today for swelling.    Diagnoses and all orders for this visit:    Pedal edema    Moderate dementia without behavioral disturbance, psychotic disturbance, mood disturbance, or anxiety, unspecified dementia type (H)    Pre-diabetes    History of prostate cancer    Other orders  -     INFLUENZA VACCINE 65+ (FLUZONE HD)         DISCUSSION  no sign that there would be a serious cause for his edema. Suspect that this is likely dependent edema that would improve with foot elevation and reduction in salt intake. Discussed close monitoring if this remains a persistent concern would warrant further consideration. Recommend obtaining additional immunizations be on the flu shot given today. See additional discussion below. Recommend repeat laboratory testing this winter when it has been six months since his previous.  Subjective:     HPI:    Patrick Wharton is a 88 year old male who suffers from dementia. He lives with his wife Alma Delia landing any independent senior apartment currently. They feel he is getting along just fine. He tries to maintain activity level by going for walks. He is here today with his son and his wife. His appointment is scheduled to address concerns regarding edema. He does have a small amount of swelling that has been present in his ankles recently. He does not elevate his feet. He does not particularly watch his salt intake. He does not have a history of any heart disease kidney disease or liver disease. He does not report any significant pain associated with his edema. Edema is variable. It is actually improved recently.    His history is significant for bipolar disorder, chronic anemia, shoulder arthroplasty, prediabetes, prostate  cancer treated number of years ago but has had PSA readings that have ranged between 0.1 to 0.2 over the past six years.    He had a visit with the care provider closer to his new residence back in May 2023 we discussed and reviewed that.    Patient feels that he is not had any real noticeable decline in his cognition but his son and his wife were present states that they noticed he has had a progressive and steady decline.    We discussed appropriate immunizations today.    We weighed the pros and cons of obtaining additional lab test but felt that he had had stable to normal labs recently enough for it did not warrant a recheck at this point.    ROS:  Complete review of systems is obtained.  Other than the specific considerations noted above complete review of systems is negative.          Objective:   Medications:  Current Outpatient Medications   Medication    divalproex sodium delayed-release (DEPAKOTE) 250 MG DR tablet    mirtazapine (REMERON) 30 MG tablet    multivitamin w/minerals (THERA-VIT-M) tablet    Psyllium (METAMUCIL FIBER) 51.7 % PACK    rivastigmine (EXELON) 3 MG capsule     No current facility-administered medications for this visit.        Allergies:   No Known Allergies     Social History     Socioeconomic History    Marital status:      Spouse name: Not on file    Number of children: Not on file    Years of education: Not on file    Highest education level: Not on file   Occupational History    Not on file   Tobacco Use    Smoking status: Never    Smokeless tobacco: Never   Vaping Use    Vaping Use: Never used   Substance and Sexual Activity    Alcohol use: No    Drug use: No    Sexual activity: Not on file   Other Topics Concern    Parent/sibling w/ CABG, MI or angioplasty before 65F 55M? No   Social History Narrative    Not on file     Social Determinants of Health     Financial Resource Strain: Low Risk  (9/29/2023)    Financial Resource Strain     Within the past 12 months, have you  or your family members you live with been unable to get utilities (heat, electricity) when it was really needed?: No   Food Insecurity: Low Risk  (9/29/2023)    Food Insecurity     Within the past 12 months, did you worry that your food would run out before you got money to buy more?: No     Within the past 12 months, did the food you bought just not last and you didn t have money to get more?: No   Transportation Needs: Low Risk  (9/29/2023)    Transportation Needs     Within the past 12 months, has lack of transportation kept you from medical appointments, getting your medicines, non-medical meetings or appointments, work, or from getting things that you need?: No   Physical Activity: Not on file   Stress: Not on file   Social Connections: Not on file   Interpersonal Safety: Not on file   Housing Stability: Low Risk  (9/29/2023)    Housing Stability     Do you have housing? : Yes     Are you worried about losing your housing?: No       Family History   Problem Relation Age of Onset    Heart Disease Mother     Alzheimer Disease Mother     Heart Failure Mother     Cancer Father         Most Recent Immunizations   Administered Date(s) Administered    COVID-19 Monovalent 18+ (Moderna) 01/14/2023    DT (PEDS <7y) 08/18/2005    FLU 6-35 months 10/07/2010    Flu 65+ Years 08/26/2019    Flu, Unspecified 08/25/2020    Influenza (H1N1) 12/23/2009    Influenza (High Dose) 3 valent vaccine 09/17/2018    Influenza (IIV3) PF 10/14/2013    Influenza Vaccine 65+ (FLUAD) 10/03/2022    Influenza Vaccine 65+ (Fluzone HD) 09/29/2023    Influenza Vaccine >6 months (Alfuria,Fluzone) 11/17/2014, 11/17/2014    Influenza Vaccine, 6+MO IM (QUADRIVALENT W/PRESERVATIVES) 10/14/2013    Pneumo Conj 13-V (2010&after) 11/22/2016    Pneumococcal 23 valent 11/17/2014    TDAP (Adacel,Boostrix) 11/24/2017    Td (Adult), Adsorbed 08/18/2005    Td,adult,historic,unspecified 08/18/2005    Zoster recombinant adjuvanted (SHINGRIX) 01/03/2020        Wt  Readings from Last 3 Encounters:   09/29/23 66.2 kg (145 lb 14.4 oz)   03/13/23 64.9 kg (143 lb)   12/09/22 64.9 kg (143 lb)        BP Readings from Last 6 Encounters:   09/29/23 92/60   03/13/23 104/67   12/09/22 118/68   08/19/22 120/72   07/11/22 100/64   04/05/22 130/74        Hemoglobin A1C   Date Value Ref Range Status   12/09/2022 5.8 (H) 0.0 - 5.6 % Final     Comment:     Normal <5.7%   Prediabetes 5.7-6.4%    Diabetes 6.5% or higher     Note: Adopted from ADA consensus guidelines.   10/05/2021 5.3 0.0 - 5.6 % Final     Comment:     Normal <5.7%   Prediabetes 5.7-6.4%    Diabetes 6.5% or higher     Note: Adopted from ADA consensus guidelines.   12/18/2020 5.5 <=5.6 % Final              PHYSICAL EXAM:    BP 92/60 (BP Location: Left arm, Patient Position: Sitting, Cuff Size: Adult Regular)   Pulse 74   Temp 98.5  F (36.9  C) (Temporal)   Resp 16   Wt 66.2 kg (145 lb 14.4 oz)   SpO2 96%   BMI 21.55 kg/m           General Appearance:    Alert, cooperative, no distress   Eyes:   No scleral icterus or conjunctival irritation       Lungs:     Clear to auscultation bilaterally, respirations unlabored, no wheezes or crackles   Heart:    Regular rate and rhythm,  No murmur   Abdomen:    Soft, no distention, no tenderness on palpation, no masses, no organomegaly     Extremities: bilateral pedal edema non-pitting no redness or pain on palpation. Strongly palpable dorsalis pedis pulses on both feet.   Skin:  No concerning skin findings, no suspicious moles, no rashes   Neurologic:  On gross examination there is no motor or sensory deficit.                                           Answers submitted by the patient for this visit:  General Questionnaire (Submitted on 9/29/2023)  Chief Complaint: Chronic problems general questions HPI Form  What is the reason for your visit today? : general check up  How many servings of fruits and vegetables do you eat daily?: 2-3  On average, how many sweetened beverages do you  drink each day (Examples: soda, juice, sweet tea, etc.  Do NOT count diet or artificially sweetened beverages)?: 0  How many minutes a day do you exercise enough to make your heart beat faster?: 9 or less  How many days a week do you exercise enough to make your heart beat faster?: 7  How many days per week do you miss taking your medication?: 0

## 2023-09-29 NOTE — PATIENT INSTRUCTIONS
Today we administered your flu shot.    Please obtain the following vaccines sometime before the start of winter:    RSV, COVID booster vaccine      Follow-up with me in approximately May 2024 to recheck labs.    I do not think your swelling is caused by any significant problem such as a heart problem or blood clot.    Try to reduce the amount of salt in your diet and elevate your feet more than you are doing.

## 2023-10-19 ENCOUNTER — OFFICE VISIT (OUTPATIENT)
Dept: PEDIATRICS | Facility: CLINIC | Age: 88
End: 2023-10-19
Payer: COMMERCIAL

## 2023-10-19 ENCOUNTER — NURSE TRIAGE (OUTPATIENT)
Dept: FAMILY MEDICINE | Facility: CLINIC | Age: 88
End: 2023-10-19
Payer: COMMERCIAL

## 2023-10-19 ENCOUNTER — OFFICE VISIT (OUTPATIENT)
Dept: FAMILY MEDICINE | Facility: CLINIC | Age: 88
End: 2023-10-19
Payer: COMMERCIAL

## 2023-10-19 VITALS
RESPIRATION RATE: 12 BRPM | SYSTOLIC BLOOD PRESSURE: 119 MMHG | OXYGEN SATURATION: 99 % | TEMPERATURE: 97.4 F | HEART RATE: 70 BPM | DIASTOLIC BLOOD PRESSURE: 72 MMHG

## 2023-10-19 VITALS
TEMPERATURE: 97.4 F | RESPIRATION RATE: 14 BRPM | DIASTOLIC BLOOD PRESSURE: 72 MMHG | HEART RATE: 70 BPM | SYSTOLIC BLOOD PRESSURE: 119 MMHG | OXYGEN SATURATION: 99 %

## 2023-10-19 DIAGNOSIS — R42 DIZZINESS: Primary | ICD-10-CM

## 2023-10-19 DIAGNOSIS — H61.23 BILATERAL IMPACTED CERUMEN: ICD-10-CM

## 2023-10-19 PROCEDURE — 99214 OFFICE O/P EST MOD 30 MIN: CPT | Performed by: FAMILY MEDICINE

## 2023-10-19 PROCEDURE — 99207 PR FIRST ORDER ACUTE REFERRAL: CPT | Performed by: NURSE PRACTITIONER

## 2023-10-19 RX ORDER — MECLIZINE HYDROCHLORIDE 25 MG/1
25 TABLET ORAL 3 TIMES DAILY PRN
Qty: 90 TABLET | Refills: 0 | Status: SHIPPED | OUTPATIENT
Start: 2023-10-19 | End: 2024-07-29

## 2023-10-19 ASSESSMENT — PAIN SCALES - GENERAL: PAINLEVEL: NO PAIN (0)

## 2023-10-19 NOTE — TELEPHONE ENCOUNTER
Nurse Triage SBAR    Is this a 2nd Level Triage? NO.     Situation:   Patient stated he would like an appt today d/t dizziness.     Background:   Denied being DM.  Age 88.  [place].  Anxiety  Depression.    Assessment:   Patient and Bel stated that pt is trying to get an appt for today to have his ears checked to see if it needs to be flushed out as pt is currently having some dizzy spells. It's been off and on for a couple of months per Bel, but then she also stated this is the 4th times in a short period of time.    Patient has been sitting and dizziness spells came on suddenly. It was not due to changes in position, BP or blood sugar per pt.     Patient vomited once last week.    Patient denied SOB/difficulty breathing. Patient denied chest pain and palpitations.    Patient denied being dehydrated.    Patient denied weakness of one side of face, arm, body or leg.    Patient stated that the room spinning and everything is moving.      Protocol Recommended Disposition:   Go To Office Now    Recommendation: No available clinic appt for today. Patient advised to be seen at closest [facility]. Patient and Bel verbalized understanding. Provided them with address to [facility] as Bel stated that will be the closest one to them.    Routed to provider    Does the patient meet one of the following criteria for ADS visit consideration? 16+ years old, with an [facility] PCP     TIP  Providers, please consider if this condition is appropriate for management at one of our Acute and Diagnostic Services sites.     If patient is a good candidate, please use dotphrase <dot>triageresponse and select Refer to ADS to document.    Reason for Disposition   Spinning or tilting sensation (vertigo) present now    Additional Information   Negative: SEVERE difficulty breathing (e.g., struggling for each breath, speaks in single words)   Negative: Shock suspected (e.g., cold/pale/clammy skin, too weak to stand, low BP, rapid pulse)    Negative: Difficult to awaken or acting confused (e.g., disoriented, slurred speech)   Negative: Fainted, and still feels dizzy afterwards   Negative: Overdose (accidental or intentional) of medications   Negative: New neurologic deficit that is present now: * Weakness of the face, arm, or leg on one side of the body * Numbness of the face, arm, or leg on one side of the body * Loss of speech or garbled speech   Negative: Heart beating < 50 beats per minute OR > 140 beats per minute   Negative: Sounds like a life-threatening emergency to the triager   Negative: Chest pain   Negative: Rectal bleeding, bloody stool, or tarry-black stool   Negative: Vomiting is main symptom   Negative: Diarrhea is main symptom   Negative: Headache is main symptom   Negative: Heat exhaustion suspected (i.e., dehydration from heat exposure)   Negative: Patient states that they are having an anxiety or panic attack   Negative: Dizziness from low blood sugar (i.e., < 60 mg/dl or 3.5 mmol/l)   Negative: SEVERE dizziness (e.g., unable to stand, requires support to walk, feels like passing out now)   Negative: SEVERE headache or neck pain   Negative: Spinning or tilting sensation (vertigo) present now and one or more stroke risk factors (i.e., hypertension, diabetes mellitus, prior stroke/TIA, heart attack, age over 60) (Exception: Prior physician evaluation for this AND no different/worse than usual.)   Negative: Neurologic deficit that was brief (now gone), ANY of the following:* Weakness of the face, arm, or leg on one side of the body* Numbness of the face, arm, or leg on one side of the body* Loss of speech or garbled speech   Negative: Loss of vision or double vision  (Exception: Similar to previous migraines.)   Negative: Extra heartbeats, irregular heart beating, or heart is beating very fast (i.e., 'palpitations')   Negative: Difficulty breathing   Negative: Drinking very little and dehydration suspected (e.g., no urine > 12 hours,  very dry mouth, very lightheaded)   Negative: Follows bleeding (e.g., stomach, rectum, vagina)  (Exception: Became dizzy from sight of small amount blood.)   Negative: Patient sounds very sick or weak to the triager   Negative: Lightheadedness (dizziness) present now, after 2 hours of rest and fluids    Protocols used: Dizziness-A-OH    MAIRA Redding, RN  [facility]

## 2023-10-19 NOTE — PROGRESS NOTES
Assessment & Plan     Dizziness       Patient with episodes of room spinning dizziness x 2 in the last 2 weeks that have not resolved after lying still in bed.  Has had additional more mild episodes of dizziness that seem to be increasing that do go away with rest.      Currently asymptomatic.  With his short-term memory loss, difficult to rule out central cause of the dizziness.      Wife today says during his episode of dizziness that she noted nystagmus and that he certainly did not get better with sitting still for around 30 minutes.  Not associated with weakness, numbness or tingling, chest pain, falls, head trauma.    I believe patient requires imaging to rule out structural issue leading to episodes of dizziness such as significant carotid artery stenosis.  No MRIs are available today.  Care will be transferred to the [facility] here at [place].    Patient and family agreeable to plan.  Discussed case with Dr. Cantu with [facility].    Referral to Acute and Diagnostic Services    [phone] ([address], [place]  [address]    Transition to Acute & Diagnostic Services Clinic has been discussed with patient, and he agrees with next level of care.   Patient understands that evaluation/treatment at [facility] typically takes significantly longer than in clinic/urgent care (>2 hours).  [facility] has been contacted by provider/staff to confirm patient acceptance.         Special issues:      None                                     22 minutes spent by me on the date of the encounter doing chart review, review of test results, patient visit, documentation, discussion with other provider(s), and discussion with family         No follow-ups on file.    Marisabel Reaves, CNP  [facility]    Aylin Nguyen is a 88 year old male who presents to clinic today for the following health issues:  Chief Complaint   Patient  presents with    Vertigo     X 3 week. On and off dizziness yesterday and today morning.     Ear Problem     Bilateral ear check.     HPI    Patient reports diagnosed history of Ménière's disease 20 years ago.  About 2 weeks ago he had an episode described as vertigo with room spinning dizziness where he laid back down in bed.  Called son to come and check on him and by the time son got there, he was feeling back to normal.  Thanks total amount of time was between 45 minutes to an hour of room spinning dizziness.    Today, had episode of dizziness that started after standing up.  [place] room spinning dizziness that did not resolve with sitting back down.  Wife saw what sounds like nystagmus with this.  Wife said he was still complaining after sitting down.  Had vomiting associated with this while using the restroom.  Patient is unsure if he was able to hold perfectly still to see if the spinning dizziness went away.  Feeling back to normal now.    Denies cough, congestion, fever, headache, chest pain, shortness of breath, visual changes.    Has bilateral wax impactions in his ear and would like these cleaned out.  Has not felt confused.  Does see neurology for ongoing memory loss.    Family members called each other after initial evaluation and determined that yes he has had episodes of persistent room spinning dizziness that does not go away with rest with additional more mild episodes of dizziness that do go away with resting.             Review of Systems    See HPI        Objective    /72   Pulse 70   Temp 97.4  F (36.3  C) (Axillary)   Resp 12   SpO2 99%   Physical Exam  Constitutional:       Appearance: Normal appearance.   HENT:      Right Ear: There is impacted cerumen.      Left Ear: There is impacted cerumen.   Eyes:      Conjunctiva/sclera: Conjunctivae normal.      Pupils: Pupils are equal, round, and reactive to light.   Cardiovascular:      Rate and Rhythm: Normal rate and regular rhythm.       Pulses: Normal pulses.      Heart sounds: Normal heart sounds.   Pulmonary:      Effort: Pulmonary effort is normal.   Musculoskeletal:         General: Normal range of motion.   Skin:     General: Skin is warm and dry.   Neurological:      Mental Status: He is alert.   Psychiatric:         Mood and Affect: Mood normal.

## 2023-10-19 NOTE — PROCEDURES
Cerumen removal:     Ear flush was used to remove cerumen from bilateral ear(s) by assistant.    No immediate complications noted.      Marisabel Reaves, CNP

## 2023-10-19 NOTE — PROGRESS NOTES
Assessment & Plan     Dizziness  Bilateral Impaction Of Cerumen  History of Meniere's disease with previous bilateral tube placement for fluid drainage - no longer present. Beginning two weeks ago, he has experienced intermittent periods of dizziness lasting between a few minutes to an hour, gradually improving in severity. Most recent episode of dizziness this morning described as room shifting around him, accompanied by nausea/vomiting. No recent head trauma, facial drooping, headache, vision changes. Ears cleaned today in clinic, improvement to hearing, possible improvement to dizziness. CN II-XII all intact. Bilateral upper and lower extremity strength is 5/5. Low suspicion for stroke given stability over time and reassuring vitals and neuro exam. Acoustic neuroma on differential with dizzy episodes starting 2 weeks ago, challenging to assess hearing loss, as baseline hearing is low. Encouraged patient to follow up with PCP at end of the month to consider MRI to rule out insidious process.  - meclizine (ANTIVERT) 25 MG tablet  Dispense: 90 tablet; Refill: 0  - Follow up with Dr. Riley at the end of the month    Discussed with family and felt like they wanted to wait on further imaging - discussed carotids vs. MRI.  If persistent and worsening symptoms, may want to look at MRI but with ear irrigation and improvement today in dizziness, no dizziness at the time of exam, comfortable with outpatient further workup.               Follow up scheduled for 10/30    Daniella Cantu MD  [facility]    Aylin Nguyen is a 88 year old, presenting for the following health issues:  Dizziness        9/29/2023     3:12 PM   Additional Questions   Roomed by Collette   Accompanied by Wife, son       HPI     Dizziness  Onset/Duration: 2/3 weeks ago (9/30/2023)  Description:   Do you feel faint: YES  Does it feel like the surroundings (bed, room) are moving: YES  Unsteady/off balance: YES  Have you passed  out or fallen: YES  Intensity: 8/10  Progression of Symptoms: worsening and intermittent  Accompanying Signs & Symptoms:  Heart palpitations or chest pain: No  Nausea, vomiting: Yes - to both, today included  Weakness or lack of coordination in arms or legs: Patient unsure  Vision or speech changes: No  Numbness or tingling: No  Ringing in ears (Tinnitus): No  Hearing Loss: No  History:   Head trauma/concussion history: No  Previous similar symptoms: YES- 20 years ago  Recent bleeding history: No  Any new medications (BP?): No  Precipitating factors:   Worse with activity: No activity, but doesn't get worse when he walks  Worse with head movement: No  Alleviating factors:   Does staying in a fixed position give relief: YES- patient not entirely sure, but states if he waits a bit it will stop  Therapies tried and outcome: None        Review of Systems   No vision changes, headache, facial asymmetry    Positive for nausea, vomiting, dizziness      Objective    /72   Pulse 70   Temp 97.4  F (36.3  C) (Oral)   Resp 14   SpO2 99%   There is no height or weight on file to calculate BMI.  Physical Exam   GENERAL: healthy, alert and no distress. Sitting comfortably in wheelchair at today's visit  EYES: Eyes grossly normal to inspection, PERRL and conjunctivae and sclerae normal  HENT: ear canals and TM's normal, minimal wax. No ear tubes present on exam.  RESP: lungs clear to auscultation - no rales, rhonchi or wheezes  CV: regular rate and rhythm, normal S1 S2, no S3 or S4, no murmur, click or rub, no peripheral edema  MS: no gross musculoskeletal defects noted, no edema  SKIN: Possible seborrheic keratosis on forehead  NEURO: Alert and oriented to place and self. Eyes PERRL. Extra-ocular movements and eye-tracking intact. No nystagmus appreciated. Facial sensation and muscles intact bilaterally with no abnormalities. Able to swallow and articulate without complication. CN XI intact with trapezius strength test.  CNXII intact, tongue protruded without deviation. Upper and lower extremity strength 5/5 bilaterally. Normal tone.  PSYCH: mentation appears normal, affect appropriate for exam    No results found for any visits on 10/19/23.    ----- Services Performed by a MEDICAL STUDENT in Presence of ATTENDING Physician-------

## 2023-10-24 ENCOUNTER — TRANSFERRED RECORDS (OUTPATIENT)
Dept: HEALTH INFORMATION MANAGEMENT | Facility: CLINIC | Age: 88
End: 2023-10-24

## 2023-10-24 PROBLEM — Z85.46 HISTORY OF PROSTATE CANCER: Status: ACTIVE | Noted: 2023-05-16

## 2023-10-24 PROBLEM — R63.6 MILDLY UNDERWEIGHT ADULT: Status: ACTIVE | Noted: 2023-05-16

## 2023-11-01 NOTE — PROGRESS NOTES
In person evaluation    HPI  January 1, 2020, in person consultation transfer of care from Dr. Vikas Mcginnis  4/27/2020, telephone visit  8/3/2020, telephone visit  11/6/2020, video visit  8/13/2021, in person visit  2/16/2022, in person visit  8/19/2022, in person visit  3/13/2023, in person visit  11/3/2023, in person visit      88-year-old followed neurologically for:  Cognitive decline (memory loss)  Onset as far back as 2015  Difficulty remembering people's names  Previously cared for by Dr. Vikas Mcginnis   history of bipolar disorder  Abnormal EEG    Since last seen  No hospitalizations  No surgeries  No falls or injuries  Does have dizziness/vertigo  Has significant hearing loss left ear is very bad, right ear has a hearing aid and it kind of works  Patient does better when he can read lips     No hallucinations  Gets up once at nighttime to go to the bathroom  Uses walker  Sometimes if he moves too quick he will have a little bit of dizziness    No faintness  Pulse is 69  No diarrhea    Only eats 1 meal a day but that is his choice  No hallucinations no vivid dreams    Walks okay with the 4 wheeled walker with hand break  Family thought that maybe he fatigues with the 25-minute walk  Back when he had COVID a year before it kind of took a lot out of him but he rallied a bit       Mirtazapine 30 mg at nighttime to help with sleep prescribed by other physicians    Forgetfulness is about the same although 8 years of gone by it has fallen off of that  Does use a four-wheel walker with hand break discussed gait safety          A.  Mild cognitive impairment       Onset as far back as 2015       Trouble remembering people's names       Past abnormal EEG       Previously cared for by Dr. Vikas Mcginnis       Mother had some dementia the last year of life when she lived to be 90       Scored a 23 out of 30, (8/13/2021)       Scored a 25 out of 30, (January 2020)       Subtle change over a year and a half         We discussed  cognitive decline       Exelon 3 mg 1 tab p.o. twice daily      Depakote 250 mg 1 p.o. nightly        No GERD no diarrhea no lightheadedness no black tarry stools no blood in the stool      Does have severe hearing loss at baseline      B.  Has history of bipolar disorder        Neurologic review from original visit August 2021  Patient feels that he has had a falloff in his memory has more trouble tracking and following conversations  Used to go to a group with his retired friends from [employer] but they cannot get together due to Covid  Has severe hearing loss in both ears  Hearing doctor thought that they could maybe make a hearing aid that would work on the left ear but the not sure  With the mask on and talking to him he misses a lot of the conversation though  I am sure this is causing some difficulty with his memory recall and tracking conversations    Patient is still coherent can should chat about multiple topics  Can jump from topic to topic fairly easy  Does better when we talk about more things from the past  Scored a 23 out of 30, (8/13/2021)  Scored a 25 out of 30, (January 2020)  Subtle change over a year and a half        Neurologic summary:  Previously followed by Dr. Vikas Mcginnis  Mild cognitive impairment as far back as 2015  Patient works for [employer] as a   Retired in 1998  [facility] testing January 2020, 25 out of 30  Seems to have difficulty remembering people's names  Has significant hearing loss right greater than left left is not enough to be helped by hearing aid  Past history of bipolar disorder  Mother with dementia in the last year of her life at age 90  Uncle last year of life age 88 developed dementia  In the distant past of been on Depakote for 2 to 3 years for bipolar disorder  Has an abnormal EEG  Patient restarted on Depakote 2020  Talked about the pros and cons of using this to maybe stabilize the temporal lobe see if his symptoms are little less severe may be slow down the progression  His  daughter has depression and is on Depakote    Patient tends to be quite active rows or exercises on the exercise bike regularly  Watches his diet hemoglobin A1c's have been good  Tries to stay active volunteers and participates in activities      Past medical history  Mild cognitive impairment  Bipolar disorder  Prostate cancer  Benign prostatic hypertrophy  Seborrheic dermatitis  Osteoarthritis with shoulder surgery  Left leg edema    Habits   Non-smoker  Does not drink alcohol  Worked as a [employer]  retired in   Lives in seniors apartment with elevator  Does exercise regularly stays active      Family history  Mother had glaucoma congestive heart failure dementia  about age 90  Father  at 34 with testicular cancer  He has 1 sister with some diabetes  Daughter with depression treated with Depakote  Uncle with difficulty with memory at the age of 88 last year of his life      Work-up review  MRI scan brain 2016 mild to moderate atrophy progressed mildly from   Laboratory data 2019  Sodium 140 potassium 4.9 BUN 8 creatinine 0.8 glucose 94  White blood count 6.3 hemoglobin 13.2 platelets 313,000  Hemoglobin A1c 5.8%  Outside records from Dr. Mcginnis 2018, 2018 reviewed  EEG 2020 rare F7/T3 sharp waves with RAINE activity with hyperventilation mild to moderately abnormal  B12 689 ,TSH 1.57, (2020)    [facility]  2020,    25 out of 30   2021,   23 out of 30   2022,   22 out of 30  3/13/2023,    24 out of 30      Laboratory data review                      2022  NA/K             138/4.6          136/4.2  BUN/Cr          3.7/0.73        6/0.82  GLU               91                  87  AST               30  WBC/HGB     5.7/13.2         5.3/13.3  PLTs              226,000         227,000  HGBA1C        5.8                5.9      Exam    Review of system   Pertinent positives and negatives  Chronic hearing loss left much worse than  right has a hearing aid on the right  Reads lips    No hallucinations  No vivid dreams    Dizziness periodically  No diplopia  No dysarthria  No dysphagia  No diarrhea  No tremor  No chest pain no shortness of breath  No headache      Ataxia uses the walker and cane  Discussed gait safety    Otherwise review systems negative    General exam  Blood pressure 91/59, pulse 69  Alert oriented x3  HEENT significant for hearing loss in left ear and right ear poor hearing and has a hearing aid  Lungs clear  Abdomen soft  No edema the feet  Patient fan only eats 1 meal per day    Neurologic exam  Alert orient x3  Normal prosody speech  Normal naming  Normal comprehension  Normal repetition  No aphasia  No neglect  [facility]  3/13/2023,    24 out of 30      Cranial 2 through 12 are significant for  Deaf in left ear significant hearing loss of the other  No ophthalmoplegia  No nystagmus  Face symmetrical  Tongue twisters okay  Visual fields intact    Upper extremities  No drift no tremor normal finger-nose    Lower extremities  Distal proximal strength good    Gait  Gets up pushing off with his hands uses a four-wheel walker  Discussed gait safety  Has somewhat of a flexed posture          Assessment/Plan     1.  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  Discussed gait safety     Refilled medication  Exelon/Depakote    family prefers to follow-up in about a  year    Follow-up August 2024    Total care time today 30 minutes    As part of visit today  Reviewed EMR notes  Reviewed laboratory data

## 2023-11-03 ENCOUNTER — OFFICE VISIT (OUTPATIENT)
Dept: NEUROLOGY | Facility: CLINIC | Age: 88
End: 2023-11-03
Payer: COMMERCIAL

## 2023-11-03 VITALS
HEIGHT: 69 IN | SYSTOLIC BLOOD PRESSURE: 91 MMHG | WEIGHT: 145 LBS | DIASTOLIC BLOOD PRESSURE: 59 MMHG | HEART RATE: 69 BPM | BODY MASS INDEX: 21.48 KG/M2

## 2023-11-03 DIAGNOSIS — G31.84 MCI (MILD COGNITIVE IMPAIRMENT): Primary | ICD-10-CM

## 2023-11-03 PROCEDURE — 99214 OFFICE O/P EST MOD 30 MIN: CPT | Performed by: PSYCHIATRY & NEUROLOGY

## 2023-11-03 RX ORDER — RIVASTIGMINE TARTRATE 3 MG/1
3 CAPSULE ORAL 2 TIMES DAILY
Qty: 180 CAPSULE | Refills: 3 | Status: SHIPPED | OUTPATIENT
Start: 2023-11-03 | End: 2023-11-27

## 2023-11-03 RX ORDER — DIVALPROEX SODIUM 250 MG/1
250 TABLET, DELAYED RELEASE ORAL DAILY
Qty: 90 TABLET | Refills: 3 | Status: SHIPPED | OUTPATIENT
Start: 2023-11-03 | End: 2023-11-27

## 2023-11-03 NOTE — NURSING NOTE
Chief Complaint   Patient presents with    TIA     Follow up for repetitive TIA's. Denies any concerns. He has been having bouts of dizziness, was started on meclizine and had his ears flushed and it has helped slightly.       Rolanda Lomeli LPN on 11/3/2023 at 2:21 PM

## 2023-11-03 NOTE — LETTER
11/3/2023         RE: Patrick Wharotn  [address]  [facility] [address]        Dear Colleague,    Thank you for referring your patient, Patrick Wharton, to the [facility] [place]. Please see a copy of my visit note below.    In person evaluation    HPI  January 1, 2020, in person consultation transfer of care from Dr. Vikas cMginnis  4/27/2020, telephone visit  8/3/2020, telephone visit  11/6/2020, video visit  8/13/2021, in person visit  2/16/2022, in person visit  8/19/2022, in person visit  3/13/2023, in person visit  11/3/2023, in person visit      88-year-old followed neurologically for:  Cognitive decline (memory loss)  Onset as far back as 2015  Difficulty remembering people's names  Previously cared for by Dr. Vikas Mcginnis   history of bipolar disorder  Abnormal EEG    Since last seen  No hospitalizations  No surgeries  No falls or injuries  Does have dizziness/vertigo  Has significant hearing loss left ear is very bad, right ear has a hearing aid and it kind of works  Patient does better when he can read lips     No hallucinations  Gets up once at nighttime to go to the bathroom  Uses walker  Sometimes if he moves too quick he will have a little bit of dizziness    No faintness  Pulse is 69  No diarrhea    Only eats 1 meal a day but that is his choice  No hallucinations no vivid dreams    Walks okay with the 4 wheeled walker with hand break  Family thought that maybe he fatigues with the 25-minute walk  Back when he had COVID a year before it kind of took a lot out of him but he rallied a bit       Mirtazapine 30 mg at nighttime to help with sleep prescribed by other physicians    Forgetfulness is about the same although 8 years of gone by it has fallen off of that  Does use a four-wheel walker with hand break discussed gait safety          A.  Mild cognitive impairment       Onset as far back as 2015       Trouble remembering people's names       Past abnormal EEG        Previously cared for by Dr. Vikas Mcginnis       Mother had some dementia the last year of life when she lived to be 90       Scored a 23 out of 30, (8/13/2021)       Scored a 25 out of 30, (January 2020)       Subtle change over a year and a half         We discussed cognitive decline       Exelon 3 mg 1 tab p.o. twice daily      Depakote 250 mg 1 p.o. nightly        No GERD no diarrhea no lightheadedness no black tarry stools no blood in the stool      Does have severe hearing loss at baseline      B.  Has history of bipolar disorder        Neurologic review from original visit August 2021  Patient feels that he has had a falloff in his memory has more trouble tracking and following conversations  Used to go to a group with his retired friends from [employer] but they cannot get together due to Covid  Has severe hearing loss in both ears  Hearing doctor thought that they could maybe make a hearing aid that would work on the left ear but the not sure  With the mask on and talking to him he misses a lot of the conversation though  I am sure this is causing some difficulty with his memory recall and tracking conversations    Patient is still coherent can should chat about multiple topics  Can jump from topic to topic fairly easy  Does better when we talk about more things from the past  Scored a 23 out of 30, (8/13/2021)  Scored a 25 out of 30, (January 2020)  Subtle change over a year and a half        Neurologic summary:  Previously followed by Dr. Vikas Mcginnis  Mild cognitive impairment as far back as 2015  Patient works for [employer] as a   Retired in 1998  MoCA testing January 2020, 25 out of 30  Seems to have difficulty remembering people's names  Has significant hearing loss right greater than left left is not enough to be helped by hearing aid  Past history of bipolar disorder  Mother with dementia in the last year of her life at age 90  Uncle last year of life age 88 developed dementia  In the distant past of been on  Depakote for 2 to 3 years for bipolar disorder  Has an abnormal EEG  Patient restarted on Depakote   Talked about the pros and cons of using this to maybe stabilize the temporal lobe see if his symptoms are little less severe may be slow down the progression  His daughter has depression and is on Depakote    Patient tends to be quite active rows or exercises on the exercise bike regularly  Watches his diet hemoglobin A1c's have been good  Tries to stay active volunteers and participates in activities      Past medical history  Mild cognitive impairment  Bipolar disorder  Prostate cancer  Benign prostatic hypertrophy  Seborrheic dermatitis  Osteoarthritis with shoulder surgery  Left leg edema    Habits   Non-smoker  Does not drink alcohol  Worked as a 3M  retired in   Lives in seniors apartment with elevator  Does exercise regularly stays active      Family history  Mother had glaucoma congestive heart failure dementia  about age 90  Father  at 34 with testicular cancer  He has 1 sister with some diabetes  Daughter with depression treated with Depakote  Uncle with difficulty with memory at the age of 88 last year of his life      Work-up review  MRI scan brain 2016 mild to moderate atrophy progressed mildly from   Laboratory data 2019  Sodium 140 potassium 4.9 BUN 8 creatinine 0.8 glucose 94  White blood count 6.3 hemoglobin 13.2 platelets 313,000  Hemoglobin A1c 5.8%  Outside records from Dr. Mcginnis 2018, 2018 reviewed  EEG 2020 rare F7/T3 sharp waves with RAINE activity with hyperventilation mild to moderately abnormal  B12 689 ,TSH 1.57, (2020)    MoCA  2020,    25 out of 30   2021,   23 out of 30   2022,   22 out of 30  3/13/2023,    24 out of 30      Laboratory data review                      2022  NA/K             138/4.6          136/4.2  BUN/Cr          3.7/0.73        6/0.82  GLU               91                   87  AST               30  WBC/HGB     5.7/13.2         5.3/13.3  PLTs              226,000         227,000  HGBA1C        5.8                5.9      Exam    Review of system   Pertinent positives and negatives  Chronic hearing loss left much worse than right has a hearing aid on the right  Reads lips    No hallucinations  No vivid dreams    Dizziness periodically  No diplopia  No dysarthria  No dysphagia  No diarrhea  No tremor  No chest pain no shortness of breath  No headache      Ataxia uses the walker and cane  Discussed gait safety    Otherwise review systems negative    General exam  Blood pressure 91/59, pulse 69  Alert oriented x3  HEENT significant for hearing loss in left ear and right ear poor hearing and has a hearing aid  Lungs clear  Abdomen soft  No edema the feet  Patient fan only eats 1 meal per day    Neurologic exam  Alert orient x3  Normal prosody speech  Normal naming  Normal comprehension  Normal repetition  No aphasia  No neglect  MoCA  3/13/2023,    24 out of 30      Cranial 2 through 12 are significant for  Deaf in left ear significant hearing loss of the other  No ophthalmoplegia  No nystagmus  Face symmetrical  Tongue twisters okay  Visual fields intact    Upper extremities  No drift no tremor normal finger-nose    Lower extremities  Distal proximal strength good    Gait  Gets up pushing off with his hands uses a four-wheel walker  Discussed gait safety  Has somewhat of a flexed posture          Assessment/Plan     1.  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)        Mild cognitive impairment      Onset as far back as 2015       Family history of dementia in the later years mom and uncle       History of bipolar disorder       Abnormal EEG F7/T3 sharp wave         Depakote 250 mg 1 tablet nightly       Exelon 3 mg capsule twice daily (added 8/13/2021)    Tolerating medication    Discussed neurodegenerative disease and progression  Discussed as time goes on there  is less neurologic reserve  With significant hearing loss this can affect his cognition also  Discussed that more meds may not necessarily make things better.  Discussed gait safety     Refilled medication  Exelon/Depakote    family prefers to follow-up in about a year    Follow-up August 2024    Total care time today 30 minutes    As part of visit today  Reviewed EMR notes  Reviewed laboratory data            Again, thank you for allowing me to participate in the care of your patient.        Sincerely,        chandler Rene MD

## 2023-11-26 DIAGNOSIS — G31.84 MCI (MILD COGNITIVE IMPAIRMENT): ICD-10-CM

## 2023-11-27 RX ORDER — RIVASTIGMINE TARTRATE 3 MG/1
3 CAPSULE ORAL 2 TIMES DAILY
Qty: 180 CAPSULE | Refills: 3 | Status: SHIPPED | OUTPATIENT
Start: 2023-11-27 | End: 2024-08-12

## 2023-11-27 RX ORDER — DIVALPROEX SODIUM 250 MG/1
250 TABLET, DELAYED RELEASE ORAL DAILY
Qty: 90 TABLET | Refills: 3 | Status: SHIPPED | OUTPATIENT
Start: 2023-11-27 | End: 2024-08-12

## 2023-11-27 NOTE — TELEPHONE ENCOUNTER
Refill request for: rivastigmine 3mg   Directions: Take 1 capsule (3 mg) by mouth 2 times daily     Refill request for: divalproex sodium delayed-release (DEPAKOTE) 250 MG DR tablet    Directions: Take 1 tablet (250 mg) by mouth daily     Pharmacy change request.    LOV: 11/03/23  NOV: 08/12/24    90 day supply with 3 refills Medication T'd for review and signature    Rolanda Lomeli LPN on 11/27/2023 at 11:05 AM

## 2023-12-11 ENCOUNTER — OFFICE VISIT (OUTPATIENT)
Dept: FAMILY MEDICINE | Facility: CLINIC | Age: 88
End: 2023-12-11
Payer: COMMERCIAL

## 2023-12-11 VITALS
DIASTOLIC BLOOD PRESSURE: 62 MMHG | SYSTOLIC BLOOD PRESSURE: 120 MMHG | WEIGHT: 145.1 LBS | BODY MASS INDEX: 21.49 KG/M2 | OXYGEN SATURATION: 99 % | HEART RATE: 70 BPM | TEMPERATURE: 97.6 F | RESPIRATION RATE: 18 BRPM | HEIGHT: 69 IN

## 2023-12-11 DIAGNOSIS — R73.03 PRE-DIABETES: ICD-10-CM

## 2023-12-11 DIAGNOSIS — R42 DIZZINESS: ICD-10-CM

## 2023-12-11 DIAGNOSIS — G31.84 MCI (MILD COGNITIVE IMPAIRMENT): ICD-10-CM

## 2023-12-11 DIAGNOSIS — F30.9 BIPOLAR I DISORDER, SINGLE MANIC EPISODE (H): ICD-10-CM

## 2023-12-11 DIAGNOSIS — E78.5 DYSLIPIDEMIA: ICD-10-CM

## 2023-12-11 DIAGNOSIS — Z00.00 MEDICARE ANNUAL WELLNESS VISIT, SUBSEQUENT: Primary | ICD-10-CM

## 2023-12-11 DIAGNOSIS — Z00.00 ENCOUNTER FOR MEDICARE ANNUAL WELLNESS EXAM: ICD-10-CM

## 2023-12-11 DIAGNOSIS — R42 VERTIGO: ICD-10-CM

## 2023-12-11 DIAGNOSIS — Z85.46 HISTORY OF PROSTATE CANCER: ICD-10-CM

## 2023-12-11 DIAGNOSIS — Z12.5 SCREENING FOR PROSTATE CANCER: ICD-10-CM

## 2023-12-11 DIAGNOSIS — R60.0 PEDAL EDEMA: ICD-10-CM

## 2023-12-11 LAB
BASOPHILS # BLD AUTO: 0 10E3/UL (ref 0–0.2)
BASOPHILS NFR BLD AUTO: 0 %
EOSINOPHIL # BLD AUTO: 0 10E3/UL (ref 0–0.7)
EOSINOPHIL NFR BLD AUTO: 1 %
ERYTHROCYTE [DISTWIDTH] IN BLOOD BY AUTOMATED COUNT: 13.4 % (ref 10–15)
HBA1C MFR BLD: 5.7 % (ref 0–5.6)
HCT VFR BLD AUTO: 39 % (ref 40–53)
HGB BLD-MCNC: 13.1 G/DL (ref 13.3–17.7)
IMM GRANULOCYTES # BLD: 0 10E3/UL
IMM GRANULOCYTES NFR BLD: 1 %
LYMPHOCYTES # BLD AUTO: 1.3 10E3/UL (ref 0.8–5.3)
LYMPHOCYTES NFR BLD AUTO: 19 %
MCH RBC QN AUTO: 33.2 PG (ref 26.5–33)
MCHC RBC AUTO-ENTMCNC: 33.6 G/DL (ref 31.5–36.5)
MCV RBC AUTO: 99 FL (ref 78–100)
MONOCYTES # BLD AUTO: 0.4 10E3/UL (ref 0–1.3)
MONOCYTES NFR BLD AUTO: 6 %
NEUTROPHILS # BLD AUTO: 5 10E3/UL (ref 1.6–8.3)
NEUTROPHILS NFR BLD AUTO: 73 %
PLATELET # BLD AUTO: 228 10E3/UL (ref 150–450)
RBC # BLD AUTO: 3.95 10E6/UL (ref 4.4–5.9)
WBC # BLD AUTO: 6.8 10E3/UL (ref 4–11)

## 2023-12-11 PROCEDURE — 83036 HEMOGLOBIN GLYCOSYLATED A1C: CPT | Performed by: FAMILY MEDICINE

## 2023-12-11 PROCEDURE — 85025 COMPLETE CBC W/AUTO DIFF WBC: CPT | Performed by: FAMILY MEDICINE

## 2023-12-11 PROCEDURE — 80061 LIPID PANEL: CPT | Performed by: FAMILY MEDICINE

## 2023-12-11 PROCEDURE — 90480 ADMN SARSCOV2 VAC 1/ONLY CMP: CPT | Performed by: FAMILY MEDICINE

## 2023-12-11 PROCEDURE — 91320 SARSCV2 VAC 30MCG TRS-SUC IM: CPT | Performed by: FAMILY MEDICINE

## 2023-12-11 PROCEDURE — G0439 PPPS, SUBSEQ VISIT: HCPCS | Performed by: FAMILY MEDICINE

## 2023-12-11 PROCEDURE — 80053 COMPREHEN METABOLIC PANEL: CPT | Performed by: FAMILY MEDICINE

## 2023-12-11 PROCEDURE — 84153 ASSAY OF PSA TOTAL: CPT | Performed by: FAMILY MEDICINE

## 2023-12-11 PROCEDURE — 36415 COLL VENOUS BLD VENIPUNCTURE: CPT | Performed by: FAMILY MEDICINE

## 2023-12-11 ASSESSMENT — ACTIVITIES OF DAILY LIVING (ADL)
WEAR_GLASSES_OR_BLIND: YES
HEARING_DIFFICULTY_OR_DEAF: YES
DIFFICULTY_COMMUNICATING: YES
CURRENT_FUNCTION: BATHING REQUIRES ASSISTANCE
DOING_ERRANDS_INDEPENDENTLY_DIFFICULTY: YES
CURRENT_FUNCTION: LAUNDRY REQUIRES ASSISTANCE
DRESSING/BATHING_DIFFICULTY: NO
WALKING_OR_CLIMBING_STAIRS_DIFFICULTY: YES
FALL_HISTORY_WITHIN_LAST_SIX_MONTHS: NO
CONCENTRATING,_REMEMBERING_OR_MAKING_DECISIONS_DIFFICULTY: YES
DIFFICULTY_EATING/SWALLOWING: NO
CHANGE_IN_FUNCTIONAL_STATUS_SINCE_ONSET_OF_CURRENT_ILLNESS/INJURY: NO

## 2023-12-11 ASSESSMENT — ENCOUNTER SYMPTOMS
FREQUENCY: 0
PARESTHESIAS: 0
MYALGIAS: 0
NERVOUS/ANXIOUS: 0
ARTHRALGIAS: 0
DIARRHEA: 0
DYSURIA: 0
SORE THROAT: 0
JOINT SWELLING: 0
NAUSEA: 0
SHORTNESS OF BREATH: 0
HEARTBURN: 0
HEMATURIA: 0
HEADACHES: 0
CONSTIPATION: 0
FEVER: 0
ABDOMINAL PAIN: 0
HEMATOCHEZIA: 0
CHILLS: 0
WEAKNESS: 0
COUGH: 0
PALPITATIONS: 0
EYE PAIN: 0
DIZZINESS: 1

## 2023-12-11 ASSESSMENT — PAIN SCALES - GENERAL: PAINLEVEL: NO PAIN (0)

## 2023-12-11 NOTE — PROGRESS NOTES
SUBJECTIVE:   Patrick is a 88 year old, presenting for the following:  Recheck Medication, Wellness Visit, and Dizziness    He has mild cognitive impairment follows with neurology.  Per neurology MoCA test remains stable at 24 out of 30 on most recent check in March 2023.  He was moving into assisted living.  Assisted living is an appropriate situation for him based on his current concern he does not need memory care at this time.    He has a history of vertigo and diagnosis of Ménière's disease.  In October developed vertigo and was seen, removal of earwax helped.  Subsequently was seen by his neurologist for his mild cognitive impairment and then by ENT.  He plans on having a hearing test tomorrow and follow-up with ENT.  He has been taking meclizine fairly regularly for recurrent bouts of dizziness.  Today we discussed the distinction between dizziness and vertigo.  When he first had vertigo in October it was a clear-cut vertiginous sensation with a room spinning type sensation.  More recently he gets what he describes as dizziness but seems to be a manifestation of more mild vertigo.  It lasts only a short time.  We discussed foregoing use of the meclizine as this may cause more undesired side effects for send short-term symptoms.  He has not had symptoms now for over 1 week.  It is recommended he continue to follow-up with specialty providers for this but overall seems to be improving.    He has a history of bipolar disorder.  No concerns currently.    He has a history of prostate cancer which was treated.  9 years ago his PSA test was undetectable.  8 years ago his PSA was 0.1.  For a number of years to remain at 0.1 but has more recently increased to 0.2.  For this reason we will continue to monitor.    He has a history of prediabetes.  His A1c from 1 year ago was 5.8.  His highest A1c recorded was 6 years ago at 6.1.    His weight is stable at approximately 145 pounds.  He is using a nutritional shake which is  "recommended to continue.  He otherwise eats a regular diet.  He eats only once a day typically.  We discussed a low-salt diet due to concerns with ongoing pedal edema.    I recommend obtaining the RSV vaccine from his pharmacy.  Today we will administer a COVID-vaccine.      Are you in the first 12 months of your Medicare coverage?  No    Have you ever done Advance Care Planning? (For example, a Health Directive, POLST, or a discussion with a medical provider or your loved ones about your wishes): Yes, advance care planning is on file.    Fallen 2 or more times in the past year?: No  Any fall with injury in the past year?: No    Fall risk: unable to do. High risk of falling.    Cognitive Screening : Performed  1) Repeat 3 items 3/3   2) Clock draw: NORMAL  3) 3 item recall: Recalls 2 objects   Results: ABNORMAL clock, 1-2 items recalled: PROBABLE COGNITIVE IMPAIRMENT, **INFORM PROVIDER**    Known cognitive impairment/memory issues.    Do you have sleep apnea, excessive snoring or daytime drowsiness? : no    Reviewed and updated as needed this visit by clinical staff  Answers submitted by the patient for this visit:  Annual Preventive Visit (Submitted on 12/11/2023)  Chief Complaint: Annual Exam:  In general, how would you rate your overall physical health?: good  Frequency of exercise:: 1 day/week  Do you usually eat at least 4 servings of fruit and vegetables a day, include whole grains & fiber, and avoid regularly eating high fat or \"junk\" foods? : No  Taking medications regularly:: Yes  Medication side effects:: Not applicable  Activities of Daily Living: bathing requires assistance, laundry requires assistance  Home safety: no safety concerns identified  Hearing Impairment:: need to ask people to speak up or repeat themselves  In the past 6 months, have you been bothered by leaking of urine?: No  In general, how would you rate your overall mental or emotional health?: fair  Additional concerns today:: " "No  Exercise outside of work (Submitted on 12/11/2023)  Chief Complaint: Annual Exam:  Duration of exercise:: N/A      Reviewed and updated as needed this visit by Provider    Social History     Tobacco Use    Smoking status: Never    Smokeless tobacco: Never   Substance Use Topics    Alcohol use: No             12/11/2023     9:51 AM   Alcohol Use   Prescreen: >3 drinks/day or >7 drinks/week? Not Applicable          No data to display              Do you have a current opioid prescription? No  Do you use any other controlled substances or medications that are not prescribed by a provider? None        Current providers sharing in care for this patient include:   Patient Care Team:  Selvin Riley MD as PCP - General (Family Practice)  Ramon Rene MD as Assigned Neuroscience Provider  Selvin Riley MD as Assigned PCP    The following health maintenance items are reviewed in Epic and correct as of today:  Health Maintenance   Topic Date Due    ANNUAL REVIEW OF  ORDERS  Never done    RSV VACCINE (Pregnancy & 60+) (1 - 1-dose 60+ series) Never done    COVID-19 Vaccine (5 - 2023-24 season) 09/01/2023    MEDICARE ANNUAL WELLNESS VISIT  12/09/2023    FALL RISK ASSESSMENT  12/11/2024    DTAP/TDAP/TD IMMUNIZATION (2 - Td or Tdap) 11/24/2027    ADVANCE CARE PLANNING  04/25/2028    PHQ-2 (once per calendar year)  Completed    INFLUENZA VACCINE  Completed    Pneumococcal Vaccine: 65+ Years  Completed    ZOSTER IMMUNIZATION  Completed    IPV IMMUNIZATION  Aged Out    HPV IMMUNIZATION  Aged Out    MENINGITIS IMMUNIZATION  Aged Out    RSV MONOCLONAL ANTIBODY  Aged Out               Review of Systems  Complete review of systems is obtained.  Other than the specific considerations noted above complete review of systems is negative.      OBJECTIVE:   /62   Pulse 70   Temp 97.6  F (36.4  C)   Resp 18   Ht 1.753 m (5' 9\")   Wt 65.8 kg (145 lb 1.6 oz)   SpO2 99%   BMI 21.43 kg/m   Estimated body mass " "index is 21.43 kg/m  as calculated from the following:    Height as of this encounter: 1.753 m (5' 9\").    Weight as of this encounter: 65.8 kg (145 lb 1.6 oz).  Physical Exam          General Appearance:    Alert, cooperative, no distress   Eyes:   No scleral icterus or conjunctival irritation, redundant eyelid tissue noted prominently around the left eye, no indication of any drooping of the muscular tissue surrounding the eye      Ears:    Normal TM's and external ear canals, both ears   Throat:   Lips, mucosa, and tongue normal; teeth and gums normal   Neck:   Supple, symmetrical, trachea midline, no adenopathy;        thyroid:  No enlargement/tenderness/nodules   Lungs:     Clear to auscultation bilaterally, respirations unlabored, no wheezes or crackles   Heart:    Regular rate and rhythm,  No murmur   Abdomen:    Soft, no distention, no tenderness on palpation, no masses, no organomegaly     Extremities: Bilateral mild edema with indented sock line, left is slightly more than the right.  No redness no pain.  Palpable dorsalis pedis pulses with good capillary refill in the feet and toes.   Skin:  No concerning skin findings, no suspicious moles, no rashes, he has a multitude of seborrheic keratoses including 1 with a fair amount of keratotic skin on the top which is located on the abdomen.  Resembles a seborrheic keratosis and not anything more concerning in my opinion.   Neurologic:  On gross examination there is no motor or sensory deficit.  Patient walks with an unsteady overall gait but utilizes his cane effectively.               Wt Readings from Last 3 Encounters:   12/11/23 65.8 kg (145 lb 1.6 oz)   11/03/23 65.8 kg (145 lb)   09/29/23 66.2 kg (145 lb 14.4 oz)        BP Readings from Last 6 Encounters:   12/11/23 120/62   11/03/23 91/59   10/19/23 119/72   10/19/23 119/72   09/29/23 92/60   03/13/23 104/67        Hemoglobin A1C   Date Value Ref Range Status   12/09/2022 5.8 (H) 0.0 - 5.6 % Final     " Comment:     Normal <5.7%   Prediabetes 5.7-6.4%    Diabetes 6.5% or higher     Note: Adopted from ADA consensus guidelines.   10/05/2021 5.3 0.0 - 5.6 % Final     Comment:     Normal <5.7%   Prediabetes 5.7-6.4%    Diabetes 6.5% or higher     Note: Adopted from ADA consensus guidelines.   12/18/2020 5.5 <=5.6 % Final             ASSESSMENT / PLAN:   Patrick was seen today for recheck medication, wellness visit and dizziness.    Diagnoses and all orders for this visit:    Dizziness  -     Comprehensive metabolic panel (BMP + Alb, Alk Phos, ALT, AST, Total. Bili, TP)  -     CBC with Platelets & Differential    Pedal edema  -     Comprehensive metabolic panel (BMP + Alb, Alk Phos, ALT, AST, Total. Bili, TP)    Pre-diabetes  -     Hemoglobin A1c  -     Comprehensive metabolic panel (BMP + Alb, Alk Phos, ALT, AST, Total. Bili, TP)    History of prostate cancer  -     PSA, tumor marker; Future    MCI (mild cognitive impairment)    Bipolar I disorder, single manic episode (H)    Screening for prostate cancer    Dyslipidemia  -     Lipid panel reflex to direct LDL Fasting    Vertigo    Other orders  -     COVID-19 12+ (2023-24) (PFIZER)           Patient has been advised of split billing requirements and indicates understanding: Yes      COUNSELING:  Reviewed preventive health counseling, as reflected in patient instructions       Regular exercise       Healthy diet/nutrition       Vision screening       Hearing screening       Dental care       Prostate cancer screening        He reports that he has never smoked. He has never used smokeless tobacco.      Appropriate preventive services were discussed with this patient, including applicable screening as appropriate for fall prevention, nutrition, physical activity, Tobacco-use cessation, weight loss and cognition.  Checklist reviewing preventive services available has been given to the patient.    Reviewed patients plan of care and provided an AVS. The Basic Care Plan  (routine screening as documented in Health Maintenance) for Patrick meets the Care Plan requirement. This Care Plan has been established and reviewed with the Patient and spouse and daughter.        Selvin Riley MD, MD  [facility]    Identified Health Risks:

## 2023-12-11 NOTE — PATIENT INSTRUCTIONS
Patient Education   Personalized Prevention Plan  You are due for the preventive services outlined below.  Your care team is available to assist you in scheduling these services.  If you have already completed any of these items, please share that information with your care team to update in your medical record.  Health Maintenance Due   Topic Date Due     ANNUAL REVIEW OF HM ORDERS  Never done     RSV VACCINE (Pregnancy & 60+) (1 - 1-dose 60+ series) Never done     COVID-19 Vaccine (5 - 2023-24 season) 09/01/2023

## 2023-12-12 LAB
ALBUMIN SERPL BCG-MCNC: 4.1 G/DL (ref 3.5–5.2)
ALP SERPL-CCNC: 67 U/L (ref 40–150)
ALT SERPL W P-5'-P-CCNC: 8 U/L (ref 0–70)
ANION GAP SERPL CALCULATED.3IONS-SCNC: 9 MMOL/L (ref 7–15)
AST SERPL W P-5'-P-CCNC: 22 U/L (ref 0–45)
BILIRUB SERPL-MCNC: 0.5 MG/DL
BUN SERPL-MCNC: 15.8 MG/DL (ref 8–23)
CALCIUM SERPL-MCNC: 9 MG/DL (ref 8.8–10.2)
CHLORIDE SERPL-SCNC: 101 MMOL/L (ref 98–107)
CHOLEST SERPL-MCNC: 156 MG/DL
CREAT SERPL-MCNC: 0.74 MG/DL (ref 0.67–1.17)
DEPRECATED HCO3 PLAS-SCNC: 27 MMOL/L (ref 22–29)
EGFRCR SERPLBLD CKD-EPI 2021: 88 ML/MIN/1.73M2
FASTING STATUS PATIENT QL REPORTED: YES
GLUCOSE SERPL-MCNC: 100 MG/DL (ref 70–99)
HDLC SERPL-MCNC: 78 MG/DL
LDLC SERPL CALC-MCNC: 65 MG/DL
NONHDLC SERPL-MCNC: 78 MG/DL
POTASSIUM SERPL-SCNC: 4.2 MMOL/L (ref 3.4–5.3)
PROT SERPL-MCNC: 6.4 G/DL (ref 6.4–8.3)
PSA SERPL DL<=0.01 NG/ML-MCNC: 0.22 NG/ML
SODIUM SERPL-SCNC: 137 MMOL/L (ref 135–145)
TRIGL SERPL-MCNC: 64 MG/DL

## 2024-01-28 ENCOUNTER — MEDICAL CORRESPONDENCE (OUTPATIENT)
Dept: HEALTH INFORMATION MANAGEMENT | Facility: CLINIC | Age: 89
End: 2024-01-28
Payer: COMMERCIAL

## 2024-01-29 ENCOUNTER — TELEPHONE (OUTPATIENT)
Dept: FAMILY MEDICINE | Facility: CLINIC | Age: 89
End: 2024-01-29

## 2024-01-29 NOTE — TELEPHONE ENCOUNTER
General Call      Reason for Call:  forms    What are your questions or concerns:  forms for assisted living faxed on 1/24/24 and need to be signed so patient can move into assisted living on 1/30/24. Please fax ASAP    [place] fax: [phone]    Date of last appointment with provider:     Could we send this information to you in [facility] or would you prefer to receive a phone call?:   call Elizabeth [phone]

## 2024-02-02 ENCOUNTER — TELEPHONE (OUTPATIENT)
Dept: FAMILY MEDICINE | Facility: CLINIC | Age: 89
End: 2024-02-02
Payer: COMMERCIAL

## 2024-02-02 NOTE — TELEPHONE ENCOUNTER
Please find out more information about what is going on with the ulcer such as who is caring for it and if there is concern that this needs to be addressed in a more immediate fashion.  It sounds from the description provided that there are nurses involved in providing care for the wound at this time but I am not certain.  If there is any more immediate concern I would recommend a more immediate evaluation.

## 2024-02-02 NOTE — TELEPHONE ENCOUNTER
Reason for call:  Other     Patient called regarding (reason for call): call back    Additional comments: Nehal is calling from [facility] and stating that the patient has a pressure sore on left hip surrounding redness has had it for a couple weeks from what patient is telling nurses and it is from sleep on left hip.     Patient was encouraged to sleep on back or right side and nurses are  replacing  the Mepilex dressing. Nehal states patient is going to call clinic and schedule himself for an appointment. Please advise and call Nehal back if needed     Phone number to reach patient:  Other phone number:  [phone]    Best Time:  any

## 2024-02-02 NOTE — TELEPHONE ENCOUNTER
Pt wife states nurse said they saw huge sore upper thigh and this could go to bone and needs to be seen asap please advise.may also call wife cell at [phone]

## 2024-02-05 ENCOUNTER — OFFICE VISIT (OUTPATIENT)
Dept: INTERNAL MEDICINE | Facility: CLINIC | Age: 89
End: 2024-02-05
Payer: COMMERCIAL

## 2024-02-05 VITALS
SYSTOLIC BLOOD PRESSURE: 98 MMHG | RESPIRATION RATE: 18 BRPM | DIASTOLIC BLOOD PRESSURE: 60 MMHG | BODY MASS INDEX: 21.71 KG/M2 | OXYGEN SATURATION: 97 % | WEIGHT: 147 LBS | HEART RATE: 72 BPM

## 2024-02-05 DIAGNOSIS — R73.03 PREDIABETES: ICD-10-CM

## 2024-02-05 DIAGNOSIS — R41.3 MEMORY LOSS: ICD-10-CM

## 2024-02-05 DIAGNOSIS — L89.222 PRESSURE INJURY OF LEFT HIP, STAGE 2 (H): Primary | ICD-10-CM

## 2024-02-05 PROCEDURE — 99214 OFFICE O/P EST MOD 30 MIN: CPT | Performed by: INTERNAL MEDICINE

## 2024-02-05 NOTE — PROGRESS NOTES
Patrick Wharton   88 year old male    Date of Visit: 2/5/2024    Chief Complaint   Patient presents with    Wound Check     Wound on Lt hip x 1 mo.     Subjective  88-year-old male with memory loss/dementia.  At an assisted living care center.  It is noted that he had a pressure ulcer on his left hip.  The wife reports that the ulcer actually was bigger in the past and has now gotten smaller.  They been using a Mepilex dressing.  No fever.  Patient does not complain of pain.  Patient cannot give further history on his hip issue with his memory but family reports that he has been sleeping on the left side in the past but is now trying to sleep on his right side more and it is getting better since doing that.    No history of other pressure sores reported.  No history of osteomyelitis.    History of bipolar depression on chart.  History of prediabetes on chart with hemoglobin A1c level of 5.7% December 2023 with a normal creatinine.    Has been no fevers or chills.  No other pressure sores reported.    PMHx:    Past Medical History:   Diagnosis Date    Anemia     Anxiety     Arthritis     Bipolar 1 disorder (H)     BPH (benign prostatic hyperplasia)     Depression     History of anesthesia complications     difficult intubation  cant open mouth wide .told should have medical alert bracelet    [facility] (hard of hearing)     MCI (mild cognitive impairment)     Prostate CA (H)      PSHx:    Past Surgical History:   Procedure Laterality Date    BACK SURGERY      BACK SURGERY      HC REPAIR ING HERNIA,5+Y/O,REDUCIBL Left 12/23/2020    Procedure: HERNIORRHAPHY, INGUINAL, OPEN;  Surgeon: Kevin Banks MD;  Location: [facility] OR;  Service: General    PROSTATE SURGERY      PROSTATE SURGERY      [facility] RECONSTR TOTAL SHOULDER IMPLANT Left 3/15/2019    Procedure: LEFT TOTAL SHOULDER ARTHROPLASTY;  Surgeon: Emiliano Monet MD;  Location: [facility];  Service: Orthopedics     Immunizations:   Immunization History    Administered Date(s) Administered    COVID-19 12+ (2023-24) (Pfizer) 12/11/2023    COVID-19 Monovalent 18+ (Moderna) 02/19/2021, 03/19/2021, 11/23/2021, 01/14/2023    DT (PEDS <7y) 05/18/1995, 08/18/2005    Flu 65+ Years 08/26/2019    Flu, Unspecified 12/04/2007, 09/26/2016, 10/01/2018, 08/25/2020    Influenza (H1N1) 12/23/2009    Influenza (High Dose) 3 valent vaccine 10/06/2015, 09/26/2016, 09/19/2017, 09/17/2018    Influenza (IIV3) PF 11/16/2005, 12/04/2007, 12/15/2008, 10/05/2009, 11/01/2011, 11/09/2012, 10/14/2013    Influenza Vaccine 65+ (FLUAD) 09/18/2021, 10/03/2022    Influenza Vaccine 65+ (Fluzone HD) 08/25/2020, 09/21/2021, 09/29/2023    Influenza Vaccine >6 months,quad, PF 11/17/2014, 11/17/2014    Influenza Vaccine, 6+MO IM (QUADRIVALENT W/PRESERVATIVES) 12/15/2008, 10/05/2009, 10/07/2010, 11/01/2011, 11/09/2012, 10/14/2013    Influenza, seasonal, injectable, PF 10/07/2010    Pneumo Conj 13-V (2010&after) 11/22/2016    Pneumococcal 23 valent 01/01/2001, 11/17/2014    TDAP (Adacel,Boostrix) 11/24/2017    Td (Adult), Adsorbed 08/18/2005    Td,adult,historic,unspecified 08/18/2005    Zoster recombinant adjuvanted (SHINGRIX) 10/31/2019, 01/03/2020       ROS A comprehensive review of systems was performed and was otherwise negative    Medications, allergies, and problem list were reviewed and updated    Exam  BP 98/60   Pulse 72   Resp 18   Wt 66.7 kg (147 lb)   SpO2 97%   BMI 21.71 kg/m    Heart is regular without murmur.  Patient is frail elderly, but can stand under his own power.  Diffuse muscle atrophy.  Abdomen is soft nontender but thin.  He does have a stage II pressure ulcer on the greater trochanteric area of his lateral left hip with some evidence of getting scar tissue skin suggesting that this has been a chronic issue, some hyperpigmentation and scarring around that area.  But just a shallow ulcer without drainage and there is no fistula that I can appreciate and there is no real  surrounding erythema of the tissues.  Just a small amount of serous drainage from the center of the 1 cm pressure ulcer that appears shallow.  No ankle edema or knee pain.    Assessment/Plan  1. Pressure injury of left hip, stage 2 (H)  Pressure ulcer on the left hip with evidence of scar tissue, chronic skin changes.  It does appear significantly better and is not infected, likely as patient is no longer sleeping on that side.  I encourage patient to avoid sleeping on that side or putting pressure on that area.    A Mepilex dressing was reapplied continue to use the padded Mepilex dressing, change every 2 to 3 days with monitoring for evidence of infection.  I did educate the family on what to look for for evidence of infection.    2. Memory loss  Chronic issue.  At assisted living.  Follow-up with primary care clinic.    3. Prediabetes  Appears controlled, not on medication.  Wound not infected.      Return in about 2 weeks (around 2/19/2024) for Follow-up on pressure wound.   Patient Instructions   Placed a Mepilex pad and dressing over left hip wound every 3 to 4 days.  Monitor for signs of infection such as increased redness, pain, purulent drainage, increased ulcer size or fever.  If signs of infection, get evaluated right away.    Avoid pressure on the left hip area.  Do not sleep on your left side.  Ulcer should heal over the next 2 to 4 weeks.    You can have home care monitor wound and change dressing every 2 to 3 days.    Follow-up with your primary care provider in 2 weeks    Sanjay Staples MD, MD        Current Outpatient Medications   Medication Sig Dispense Refill    divalproex sodium delayed-release (DEPAKOTE) 250 MG DR tablet Take 1 tablet (250 mg) by mouth daily 90 tablet 3    meclizine (ANTIVERT) 25 MG tablet Take 1 tablet (25 mg) by mouth 3 times daily as needed for dizziness 90 tablet 0    mirtazapine (REMERON) 30 MG tablet Take 1 tablet (30 mg) by mouth At Bedtime 90 tablet 3    multivitamin  w/minerals (THERA-VIT-M) tablet Take 1 tablet by mouth      Psyllium (METAMUCIL FIBER) 51.7 % PACK Take 1 packet by mouth      rivastigmine (EXELON) 3 MG capsule TAKE 1 CAPSULE BY MOUTH TWICE DAILY 180 capsule 3     No Known Allergies  Social History     Tobacco Use    Smoking status: Never     Passive exposure: Past    Smokeless tobacco: Never   Vaping Use    Vaping Use: Never used   Substance Use Topics    Alcohol use: No    Drug use: No             Subjective   Patrick is a 88 year old, presenting for the following health issues:  Wound Check (Wound on Lt hip x 1 mo.)      2/5/2024     2:21 PM   Additional Questions   Roomed by Mariangel RODRÍGUEZ   Accompanied by daughter and wife     Wound Check    History of Present Illness       Reason for visit:  Pressure ulcer  Symptom onset:  More than a month  Symptoms include:  Wound  Symptom intensity:  Moderate  Symptom progression:  Worsening  Had these symptoms before:  No  What makes it worse:  Na  What makes it better:  Na    He eats 0-1 servings of fruits and vegetables daily.He consumes 0 sweetened beverage(s) daily.He exercises with enough effort to increase his heart rate 9 or less minutes per day.  He exercises with enough effort to increase his heart rate 3 or less days per week.   He is taking medications regularly.                     Objective    There were no vitals taken for this visit.  There is no height or weight on file to calculate BMI.  Physical Exam               Signed Electronically by: Sanjay Staples MD

## 2024-02-05 NOTE — TELEPHONE ENCOUNTER
Patient has an appointment today with Dr. Staples at [facility] at 2:40 PM for evaluation of this concern.    MAIRA Soria, RN  [facility]

## 2024-02-05 NOTE — PATIENT INSTRUCTIONS
Placed a Mepilex pad and dressing over left hip wound every 3 to 4 days.  Monitor for signs of infection such as increased redness, pain, purulent drainage, increased ulcer size or fever.  If signs of infection, get evaluated right away.    Avoid pressure on the left hip area.  Do not sleep on your left side.  Ulcer should heal over the next 2 to 4 weeks.    You can have home care monitor wound and change dressing every 2 to 3 days.    Follow-up with your primary care provider in 2 weeks

## 2024-02-05 NOTE — TELEPHONE ENCOUNTER
Patient has an appointment today with Dr. Staples at [facility] at 2:40 PM for evaluation of this concern.    MAIRA Soria, RN  [facility]

## 2024-02-09 ENCOUNTER — TELEPHONE (OUTPATIENT)
Dept: FAMILY MEDICINE | Facility: CLINIC | Age: 89
End: 2024-02-09

## 2024-02-09 NOTE — TELEPHONE ENCOUNTER
Forms/Letter Request    Type of form/letter: Nursing Home/Assisted Living Orders      Do we have the form/letter: Yes: faxed to clinic    Who is the form from? Mark Cash (if other please explain)    Where did/will the form come from? form was faxed in    When is form/letter needed by: ASAP, has been faxed multiple times    How would you like the form/letter returned: Fax : [phone]    Patient Notified form requests are processed in 5-7 business days:Yes    Darryl Cash-[phone]    Annual MD orders

## 2024-02-19 ENCOUNTER — NURSE TRIAGE (OUTPATIENT)
Dept: FAMILY MEDICINE | Facility: CLINIC | Age: 89
End: 2024-02-19
Payer: COMMERCIAL

## 2024-02-19 ENCOUNTER — MYC MEDICAL ADVICE (OUTPATIENT)
Dept: FAMILY MEDICINE | Facility: CLINIC | Age: 89
End: 2024-02-19
Payer: COMMERCIAL

## 2024-02-19 NOTE — TELEPHONE ENCOUNTER
General Call    Contacts         Type Contact Phone/Fax    02/19/2024 09:27 AM CST Phone (Incoming) Patrick Wharton (Self) [phone] (H)          Reason for Call:  laxative reccomendation    What are your questions or concerns:  patient is currently taking Colase gel, but is needing a laxative in order to help him produce a bowel movement. Patient is not taking Ex-lax as he has been instructed not to. Please advise and send patient a message on [place] [facility] regarding what laxative he should take.    Date of last appointment with provider: 12/11/23    Could we send this information to you in [employer] or would you prefer to receive a phone call?:   Patient would like to be contacted via [employer]

## 2024-02-19 NOTE — TELEPHONE ENCOUNTER
Thank you for obtaining additional information.    I recommend MiraLAX (polyethylene glycol) 1 dose daily as needed for constipation.  This is an over-the-counter medication.

## 2024-02-19 NOTE — TELEPHONE ENCOUNTER
Provider Recommendation Follow Up:   Reached patient/caregiver. Informed of provider's recommendations. Patient verbalized understanding and agrees with the plan.         MAIRA Soria, RN  [facility]

## 2024-02-19 NOTE — TELEPHONE ENCOUNTER
Please determine more about his constipation in order to decide how we might best help with the situation.

## 2024-02-19 NOTE — TELEPHONE ENCOUNTER
Please see nurse triage from 2/19/24.    .RISHABH SoriaN, RN  [facility]

## 2024-02-19 NOTE — TELEPHONE ENCOUNTER
"Nurse Triage SBAR    Is this a 2nd Level Triage? NO    Situation:   \"patient is currently taking Colase gel, but is needing a laxative in order to help him produce a bowel movement. Patient is not taking Ex-lax as he has been instructed not to. Please advise and send patient a message on [employer] regarding what laxative he should take.\"    Background:   Age 88      Assessment:   Patient stated that he has ongoing routine issues of having a hard time passing a BM. He used to use Ex-lax and and was told to use Colace instead. He has been taking colace once daily about 3 PM and then 2 tablets after super on a daily basis but sometimes he is still having a hard time passing stools. The stools are not hard, but he just can't pass it. Then sometimes he will need to come back later to try to pass stools again.     He has been having a BM every da because he was desperate and has been using Ex-Lax. He kept stating he is just looking for a recommendation. He kept saying, \"Just give me a recommendation.\"    Denied blood in stools.  Denied abdominal pain.  Denied fever.  When asked if he is leaking stool, he is unable to answer question. He was asked 4 times.    Protocol Recommended Disposition:   No disposition on file.    Recommendation:   Ended triage early by stating he is just looking for a recommendation and not having is issues. He has reverted back to Ex-Lax when he is desperate for not having a BM in a few days in a row. Ex-Lax has helped, but because he was previously told not to take Ex-Lax, he wants to see what Dr Riley would recommend.    OK to leave detailed messages at mobile number.      Routed to provider    Does the patient meet one of the following criteria for ADS visit consideration? 16+ years old, with an [facility] PCP     TIP  Providers, please consider if this condition is appropriate for management at one of our Acute and Diagnostic Services sites.     If patient is a good candidate, please use dotphrase " <dot>triageresponse and select Refer to ADS to document.    Additional Information   Negative: Abdomen pain is main symptom and male   Negative: Abdomen pain is main symptom and female   Negative: Rectal bleeding or blood in stool is main symptom   Negative: Vomiting bile (green color)   Negative: Patient sounds very sick or weak to the triager   Negative: Constant abdominal pain lasting > 2 hours   Negative: Vomiting and abdomen looks much more swollen than usual   Negative: Intermittent mild abdominal pain and fever   Negative: Last bowel movement (BM) > 4 days ago    Protocols used: Constipation-A-[place]    RISHABH ReddingN, RN  [facility]

## 2024-02-23 ENCOUNTER — DOCUMENTATION ONLY (OUTPATIENT)
Dept: OTHER | Facility: CLINIC | Age: 89
End: 2024-02-23

## 2024-02-23 ENCOUNTER — OFFICE VISIT (OUTPATIENT)
Dept: FAMILY MEDICINE | Facility: CLINIC | Age: 89
End: 2024-02-23
Payer: COMMERCIAL

## 2024-02-23 VITALS
HEART RATE: 63 BPM | BODY MASS INDEX: 21.64 KG/M2 | SYSTOLIC BLOOD PRESSURE: 104 MMHG | RESPIRATION RATE: 12 BRPM | DIASTOLIC BLOOD PRESSURE: 67 MMHG | HEIGHT: 69 IN | WEIGHT: 146.1 LBS | OXYGEN SATURATION: 96 % | TEMPERATURE: 97.6 F

## 2024-02-23 DIAGNOSIS — R53.81 PHYSICAL DECONDITIONING: ICD-10-CM

## 2024-02-23 DIAGNOSIS — F09 MILD COGNITIVE DISORDER: ICD-10-CM

## 2024-02-23 DIAGNOSIS — L89.226 PRESSURE INJURY OF DEEP TISSUE OF LEFT HIP: Primary | ICD-10-CM

## 2024-02-23 PROCEDURE — 99214 OFFICE O/P EST MOD 30 MIN: CPT | Performed by: FAMILY MEDICINE

## 2024-02-23 NOTE — ASSESSMENT & PLAN NOTE
Increased concern with transferring from bed to floor/ambulation and floor  in to the bed with weakening of the quadricep and lower leg/pelvic girdle region.  Due to the patient's lack of safe routine transportation physical therapy for home ordered.  Myself or his PCP, Dr. Riley, will continue to monitor and follow patient's care

## 2024-02-23 NOTE — PROGRESS NOTES
Assessment & Plan   Problem List Items Addressed This Visit       Mild cognitive disorder    Physical deconditioning     Increased concern with transferring from bed to floor/ambulation and floor  in to the bed with weakening of the quadricep and lower leg/pelvic girdle region.  Due to the patient's lack of safe routine transportation physical therapy for home ordered.  Myself or his PCP, Dr. Riley, will continue to monitor and follow patient's care          Other Visit Diagnoses       Pressure injury of deep tissue of left hip    -  Primary    Healing very well and skin fully intact with no ulceration noted.  Continue manage care for 1 more week and then stop              Regular exercise  See Patient Instructions      Aylin Nguyen is a 88 year old, presenting for the following health issues:  Follow Up (Pressure Injury of Left Hip, Stage 2; 2/5/24; [facility] [place])        2/23/2024     8:54 AM   Additional Questions   Roomed by MEJIA Johnson   Accompanied by Spouse/Son         2/23/2024     8:54 AM   Patient Reported Additional Medications   Patient reports taking the following new medications N/A     Patient presents for follow-up on pressure ulcer of left hip.  No overlying the hip.  Has been keeping covered and changing bandage every 2 to 3 days.  Family is helping to remind him about changing the bandage.  Also notes he is having trouble with transferring in and out of the bed.  The assisted living that he lives has physical therapy available to help with the transfers and strengthening program.  Of note, patient cannot drive nor is he safe for continued services transportation on a routine basis, as such we will need to order therapy for home.    History of Present Illness       Reason for visit:  Hip wound  Symptom onset:  1-2 weeks ago  Symptom intensity:  Moderate    He eats 0-1 servings of fruits and vegetables daily.He consumes 0 sweetened beverage(s) daily.He exercises with enough effort to increase his  "heart rate 9 or less minutes per day.  He exercises with enough effort to increase his heart rate 3 or less days per week.   He is taking medications regularly.           Objective    /67 (BP Location: Left arm, Patient Position: Sitting, Cuff Size: Adult Large)   Pulse 63   Temp 97.6  F (36.4  C) (Oral)   Resp 12   Ht 1.753 m (5' 9\")   Wt 66.3 kg (146 lb 1.6 oz)   SpO2 96%   BMI 21.58 kg/m    Body mass index is 21.58 kg/m .  Physical Exam  Vitals and nursing note reviewed.   Constitutional:       General: He is not in acute distress.     Appearance: Normal appearance. He is not ill-appearing.   HENT:      Head: Normocephalic and atraumatic.   Eyes:      Extraocular Movements: Extraocular movements intact.      Conjunctiva/sclera: Conjunctivae normal.   Pulmonary:      Effort: Pulmonary effort is normal.   Skin:     Capillary Refill: Capillary refill takes less than 2 seconds.      Comments: Left hip along with ulceration.  Slight area of pain consistent with routine healing.  No induration or signs of infection   Neurological:      Mental Status: He is alert and oriented to person, place, and time.   Psychiatric:         Attention and Perception: Attention normal.         Mood and Affect: Mood normal.         Speech: Speech normal.         Thought Content: Thought content normal.              Signed Electronically by: Gurjit Esposito,     "

## 2024-02-27 ENCOUNTER — MEDICAL CORRESPONDENCE (OUTPATIENT)
Dept: HEALTH INFORMATION MANAGEMENT | Facility: CLINIC | Age: 89
End: 2024-02-27

## 2024-03-05 ENCOUNTER — OFFICE VISIT (OUTPATIENT)
Dept: FAMILY MEDICINE | Facility: CLINIC | Age: 89
End: 2024-03-05
Payer: COMMERCIAL

## 2024-03-05 VITALS
DIASTOLIC BLOOD PRESSURE: 68 MMHG | OXYGEN SATURATION: 98 % | HEART RATE: 78 BPM | SYSTOLIC BLOOD PRESSURE: 118 MMHG | RESPIRATION RATE: 18 BRPM | HEIGHT: 69 IN | WEIGHT: 140 LBS | BODY MASS INDEX: 20.73 KG/M2

## 2024-03-05 DIAGNOSIS — F09 MILD COGNITIVE DISORDER: ICD-10-CM

## 2024-03-05 DIAGNOSIS — R73.03 PRE-DIABETES: ICD-10-CM

## 2024-03-05 DIAGNOSIS — L89.226 PRESSURE INJURY OF DEEP TISSUE OF LEFT HIP: Primary | ICD-10-CM

## 2024-03-05 DIAGNOSIS — R53.81 PHYSICAL DECONDITIONING: ICD-10-CM

## 2024-03-05 DIAGNOSIS — K59.00 CONSTIPATION, UNSPECIFIED CONSTIPATION TYPE: ICD-10-CM

## 2024-03-05 DIAGNOSIS — R41.0 ACUTE CONFUSION: ICD-10-CM

## 2024-03-05 DIAGNOSIS — G31.84 MCI (MILD COGNITIVE IMPAIRMENT): ICD-10-CM

## 2024-03-05 LAB
BASOPHILS # BLD AUTO: 0 10E3/UL (ref 0–0.2)
BASOPHILS NFR BLD AUTO: 0 %
EOSINOPHIL # BLD AUTO: 0.1 10E3/UL (ref 0–0.7)
EOSINOPHIL NFR BLD AUTO: 1 %
ERYTHROCYTE [DISTWIDTH] IN BLOOD BY AUTOMATED COUNT: 13.1 % (ref 10–15)
HCT VFR BLD AUTO: 39.8 % (ref 40–53)
HGB BLD-MCNC: 13.3 G/DL (ref 13.3–17.7)
IMM GRANULOCYTES # BLD: 0 10E3/UL
IMM GRANULOCYTES NFR BLD: 0 %
LYMPHOCYTES # BLD AUTO: 1.4 10E3/UL (ref 0.8–5.3)
LYMPHOCYTES NFR BLD AUTO: 12 %
MCH RBC QN AUTO: 33 PG (ref 26.5–33)
MCHC RBC AUTO-ENTMCNC: 33.4 G/DL (ref 31.5–36.5)
MCV RBC AUTO: 99 FL (ref 78–100)
MONOCYTES # BLD AUTO: 0.9 10E3/UL (ref 0–1.3)
MONOCYTES NFR BLD AUTO: 8 %
NEUTROPHILS # BLD AUTO: 8.9 10E3/UL (ref 1.6–8.3)
NEUTROPHILS NFR BLD AUTO: 79 %
PLATELET # BLD AUTO: 205 10E3/UL (ref 150–450)
RBC # BLD AUTO: 4.03 10E6/UL (ref 4.4–5.9)
WBC # BLD AUTO: 11.3 10E3/UL (ref 4–11)

## 2024-03-05 PROCEDURE — 82607 VITAMIN B-12: CPT | Performed by: FAMILY MEDICINE

## 2024-03-05 PROCEDURE — 85025 COMPLETE CBC W/AUTO DIFF WBC: CPT | Performed by: FAMILY MEDICINE

## 2024-03-05 PROCEDURE — 36415 COLL VENOUS BLD VENIPUNCTURE: CPT | Performed by: FAMILY MEDICINE

## 2024-03-05 PROCEDURE — 84443 ASSAY THYROID STIM HORMONE: CPT | Performed by: FAMILY MEDICINE

## 2024-03-05 PROCEDURE — 99214 OFFICE O/P EST MOD 30 MIN: CPT | Performed by: FAMILY MEDICINE

## 2024-03-05 PROCEDURE — 80053 COMPREHEN METABOLIC PANEL: CPT | Performed by: FAMILY MEDICINE

## 2024-03-05 ASSESSMENT — PAIN SCALES - GENERAL: PAINLEVEL: NO PAIN (0)

## 2024-03-05 NOTE — PROGRESS NOTES
"Patrick Whatron  /68   Pulse 78   Resp 18   Ht 1.753 m (5' 9\")   Wt 63.5 kg (140 lb)   SpO2 98%   BMI 20.67 kg/m       Assessment/Plan:                Patrick was seen today for leg pain.    Diagnoses and all orders for this visit:    Pressure injury of deep tissue of left hip    Mild cognitive disorder    Physical deconditioning    Pre-diabetes  -     Comprehensive metabolic panel; Future  -     Comprehensive metabolic panel    MCI (mild cognitive impairment)    Acute confusion  -     Comprehensive metabolic panel; Future  -     CBC with Platelets & Differential; Future  -     TSH with free T4 reflex; Future  -     Vitamin B12; Future  -     Comprehensive metabolic panel  -     CBC with Platelets & Differential  -     TSH with free T4 reflex  -     Vitamin B12    Constipation, unspecified constipation type         DISCUSSION  Check labs as noted above.  No other overall change in management.  Will need further evaluation if specific medical opinion regarding competence to sign documents is required.  Subjective:     HPI:    Patrick Wharton is a 88 year old male has a diagnosis of mild cognitive impairment follows with neurology last neurology visit was in November 2023 MoCA test score was 24 out of 30 when last assessed.  He resides in assisted living with his wife who by report also is having some issues with memory.  They do have meals provided.  They have care staff that is available to help manage medical concerns.    Family had recently contacted me via messaging expressing concern about them signing documents for recio of .  There seem to be some concerns that some family members thought they should have been listed first as power of  and had been historically the power of  but new documents were completed and signed.  At this point I do not feel that I could make a determination about his capacity to understand these documents but certainly do not feel that he is in a state of " incapacity where he could not comprehend.  If this issue will require further consideration he will require further evaluation to more definitively make this determination.  This issue was not brought up by family in attendance today.    Family in attendance today is worried that he may have bouts where he is a little bit more confused and would like to have lab testing to confirm that there is no metabolic concern.    He deals with chronic constipation.  He is taking MiraLAX.  2 doses daily are causing looser stool we discussed cutting back to 1 dose daily.  By report in the past he was utilizing a stimulant laxative but he really states he was only using Colace in the past.  We will stick with the MiraLAX and decrease the dose and monitor the situation.    He has a pressure ulcer on the left hip it is in a state of near complete resolution at this point.  He has been avoiding placing weight on the area when he sleeps in particular.  He has been placing a padded dressing on the area.  I recommend continued diligence of placing the dressing and observing until it is gone completely.      Due to physical deconditioning he was sent to physical therapy he has had 3 sessions.  He reports no falls or similar concerns.  He is using a walker for ambulation today.  He is encouraged to continue this.      He has a history of prediabetes with A1c's recently that have been all less than 6.  He was told by our previous care provider that he should lose weight to help prevent development of diabetes and he still has this fixation in his mind.  His weight continues to go down his weight is not an issue or risk factor for diabetes.  Decreasing weight would be more risk for poor nutrition and this is discussed today.  He pays for 2 meals a day only eats 1 recommend he start eating 2 meals per day as well as continuing his nutritional shake supplement.  Discussed the importance of protein and hydration today.  His weight does  continue to go down.                ROS:  Complete review of systems is obtained.  Other than the specific considerations noted above complete review of systems is negative.          Objective:   Medications:  Current Outpatient Medications   Medication    divalproex sodium delayed-release (DEPAKOTE) 250 MG DR tablet    meclizine (ANTIVERT) 25 MG tablet    mirtazapine (REMERON) 30 MG tablet    multivitamin w/minerals (THERA-VIT-M) tablet    Polyethylene Glycol 3350 (MIRALAX PO)    Psyllium (METAMUCIL FIBER) 51.7 % PACK    rivastigmine (EXELON) 3 MG capsule     No current facility-administered medications for this visit.        Allergies:   No Known Allergies     Social History     Socioeconomic History    Marital status:      Spouse name: Not on file    Number of children: Not on file    Years of education: Not on file    Highest education level: Not on file   Occupational History    Not on file   Tobacco Use    Smoking status: Never     Passive exposure: Past    Smokeless tobacco: Never   Vaping Use    Vaping Use: Never used   Substance and Sexual Activity    Alcohol use: No    Drug use: No    Sexual activity: Not on file   Other Topics Concern    Parent/sibling w/ CABG, MI or angioplasty before 65F 55M? No   Social History Narrative    Not on file     Social Determinants of Health     Financial Resource Strain: Unknown (2/5/2024)    Financial Resource Strain     Within the past 12 months, have you or your family members you live with been unable to get utilities (heat, electricity) when it was really needed?: Patient declined   Food Insecurity: Unknown (2/5/2024)    Food Insecurity     Within the past 12 months, did you worry that your food would run out before you got money to buy more?: Patient declined     Within the past 12 months, did the food you bought just not last and you didn t have money to get more?: Patient declined   Transportation Needs: Unknown (2/5/2024)    Transportation Needs     Within  the past 12 months, has lack of transportation kept you from medical appointments, getting your medicines, non-medical meetings or appointments, work, or from getting things that you need?: Patient declined   Physical Activity: Not on file   Stress: Not on file   Social Connections: Not on file   Interpersonal Safety: Low Risk  (3/5/2024)    Interpersonal Safety     Do you feel physically and emotionally safe where you currently live?: Yes     Within the past 12 months, have you been hit, slapped, kicked or otherwise physically hurt by someone?: No     Within the past 12 months, have you been humiliated or emotionally abused in other ways by your partner or ex-partner?: No   Housing Stability: Unknown (2/5/2024)    Housing Stability     Do you have housing? : Patient declined     Are you worried about losing your housing?: Patient declined       Family History   Problem Relation Age of Onset    Heart Disease Mother     Alzheimer Disease Mother     Heart Failure Mother     Cancer Father         Most Recent Immunizations   Administered Date(s) Administered    COVID-19 12+ (2023-24) (Pfizer) 12/11/2023    COVID-19 Monovalent 18+ (Moderna) 01/14/2023    DT (PEDS <7y) 08/18/2005    Flu 65+ Years 08/26/2019    Flu, Unspecified 08/25/2020    Influenza (H1N1) 12/23/2009    Influenza (High Dose) 3 valent vaccine 09/17/2018    Influenza (IIV3) PF 10/14/2013    Influenza Vaccine 65+ (FLUAD) 10/03/2022    Influenza Vaccine 65+ (Fluzone HD) 09/29/2023    Influenza Vaccine >6 months,quad, PF 11/17/2014, 11/17/2014    Influenza Vaccine, 6+MO IM (QUADRIVALENT W/PRESERVATIVES) 10/14/2013    Influenza, seasonal, injectable, PF 10/07/2010    Pneumo Conj 13-V (2010&after) 11/22/2016    Pneumococcal 23 valent 11/17/2014    TDAP (Adacel,Boostrix) 11/24/2017    Td (Adult), Adsorbed 08/18/2005    Td,adult,historic,unspecified 08/18/2005    Zoster recombinant adjuvanted (SHINGRIX) 01/03/2020        Wt Readings from Last 3 Encounters:  "  03/05/24 63.5 kg (140 lb)   02/23/24 66.3 kg (146 lb 1.6 oz)   02/05/24 66.7 kg (147 lb)        BP Readings from Last 6 Encounters:   03/05/24 118/68   02/23/24 104/67   02/05/24 98/60   12/11/23 120/62   11/03/23 91/59   10/19/23 119/72        Hemoglobin A1C   Date Value Ref Range Status   12/11/2023 5.7 (H) 0.0 - 5.6 % Final     Comment:     Normal <5.7%   Prediabetes 5.7-6.4%    Diabetes 6.5% or higher     Note: Adopted from ADA consensus guidelines.   12/09/2022 5.8 (H) 0.0 - 5.6 % Final     Comment:     Normal <5.7%   Prediabetes 5.7-6.4%    Diabetes 6.5% or higher     Note: Adopted from ADA consensus guidelines.   10/05/2021 5.3 0.0 - 5.6 % Final     Comment:     Normal <5.7%   Prediabetes 5.7-6.4%    Diabetes 6.5% or higher     Note: Adopted from ADA consensus guidelines.              PHYSICAL EXAM:    /68   Pulse 78   Resp 18   Ht 1.753 m (5' 9\")   Wt 63.5 kg (140 lb)   SpO2 98%   BMI 20.67 kg/m           General Appearance:    Alert, cooperative, no distress   Eyes:   No scleral icterus or conjunctival irritation       Lungs:     Clear to auscultation bilaterally, respirations unlabored, no wheezes or crackles   Heart:    Regular rate and rhythm,  No murmur   Extremities:  Examination of his left hip reveals that there is evidence of a previous pressure injury that is in a near complete state of resolution.  The skin is still somewhat purpleish in color but otherwise intact.  The area is very small measuring about 6 mm.  No other areas of surrounding infection edema or concern for skin irritation breakdown.                                             "

## 2024-03-06 LAB
ALBUMIN SERPL BCG-MCNC: 4.2 G/DL (ref 3.5–5.2)
ALP SERPL-CCNC: 80 U/L (ref 40–150)
ALT SERPL W P-5'-P-CCNC: 9 U/L (ref 0–70)
ANION GAP SERPL CALCULATED.3IONS-SCNC: 12 MMOL/L (ref 7–15)
AST SERPL W P-5'-P-CCNC: 28 U/L (ref 0–45)
BILIRUB SERPL-MCNC: 0.5 MG/DL
BUN SERPL-MCNC: 21.4 MG/DL (ref 8–23)
CALCIUM SERPL-MCNC: 9 MG/DL (ref 8.8–10.2)
CHLORIDE SERPL-SCNC: 101 MMOL/L (ref 98–107)
CREAT SERPL-MCNC: 0.74 MG/DL (ref 0.67–1.17)
DEPRECATED HCO3 PLAS-SCNC: 24 MMOL/L (ref 22–29)
EGFRCR SERPLBLD CKD-EPI 2021: 88 ML/MIN/1.73M2
GLUCOSE SERPL-MCNC: 100 MG/DL (ref 70–99)
POTASSIUM SERPL-SCNC: 4.8 MMOL/L (ref 3.4–5.3)
PROT SERPL-MCNC: 6.6 G/DL (ref 6.4–8.3)
SODIUM SERPL-SCNC: 137 MMOL/L (ref 135–145)
TSH SERPL DL<=0.005 MIU/L-ACNC: 1.52 UIU/ML (ref 0.3–4.2)
VIT B12 SERPL-MCNC: 718 PG/ML (ref 232–1245)

## 2024-03-07 ENCOUNTER — TELEPHONE (OUTPATIENT)
Dept: FAMILY MEDICINE | Facility: CLINIC | Age: 89
End: 2024-03-07
Payer: COMMERCIAL

## 2024-03-07 NOTE — TELEPHONE ENCOUNTER
Test Results    Contacts         Type Contact Phone/Fax    03/07/2024 12:43 PM CST Phone (Incoming) Patrick Wharton (Self) [phone] (H)            Who ordered the test:  beth    Type of test: Lab    Date of test:  3/5/24    [facility] the [facility]    What are your questions/concerns?:  patient is wondering if a PSA tumor marker test can be ran, as the last one was done on 12/11/23 and is wanting to know if/when another can/should be done. Please advise    Could we send this information to you in [employer] or would you prefer to receive a phone call?:   Patient would prefer a phone call   Okay to leave a detailed message?: Yes at Cell number on file:    Telephone Information:   Mobile [phone]

## 2024-03-07 NOTE — TELEPHONE ENCOUNTER
There is no reason to check his PSA test at this time.  I recommend he have a PSA test done in June or July with a lab visit.

## 2024-04-03 ENCOUNTER — OFFICE VISIT (OUTPATIENT)
Dept: FAMILY MEDICINE | Facility: CLINIC | Age: 89
End: 2024-04-03
Payer: COMMERCIAL

## 2024-04-03 VITALS
SYSTOLIC BLOOD PRESSURE: 106 MMHG | BODY MASS INDEX: 21.74 KG/M2 | HEIGHT: 69 IN | WEIGHT: 146.8 LBS | DIASTOLIC BLOOD PRESSURE: 71 MMHG | HEART RATE: 67 BPM | TEMPERATURE: 97.5 F | RESPIRATION RATE: 16 BRPM | OXYGEN SATURATION: 97 %

## 2024-04-03 DIAGNOSIS — R53.81 PHYSICAL DECONDITIONING: Primary | ICD-10-CM

## 2024-04-03 PROCEDURE — 99212 OFFICE O/P EST SF 10 MIN: CPT | Performed by: FAMILY MEDICINE

## 2024-04-03 NOTE — ASSESSMENT & PLAN NOTE
Overall doing well.  Using walker full-time along with fourposter cane and tight areas.  Of note even after physical therapy his strength is such that using a normal toilet or 1 without handrails or elevated seat he cannot get back up on his own.    Continue with assistive devices.

## 2024-04-19 ENCOUNTER — MYC MEDICAL ADVICE (OUTPATIENT)
Dept: FAMILY MEDICINE | Facility: CLINIC | Age: 89
End: 2024-04-19
Payer: COMMERCIAL

## 2024-04-19 DIAGNOSIS — M17.0 BILATERAL PRIMARY OSTEOARTHRITIS OF KNEE: Primary | ICD-10-CM

## 2024-04-19 DIAGNOSIS — R53.81 PHYSICAL DECONDITIONING: ICD-10-CM

## 2024-04-19 DIAGNOSIS — G31.84 MCI (MILD COGNITIVE IMPAIRMENT): ICD-10-CM

## 2024-05-08 ENCOUNTER — VIRTUAL VISIT (OUTPATIENT)
Dept: OCCUPATIONAL THERAPY | Facility: CLINIC | Age: 89
End: 2024-05-08
Attending: FAMILY MEDICINE
Payer: COMMERCIAL

## 2024-05-08 DIAGNOSIS — G31.84 MCI (MILD COGNITIVE IMPAIRMENT): ICD-10-CM

## 2024-05-08 DIAGNOSIS — Z78.9 IMPAIRED MOBILITY AND ADLS: Primary | ICD-10-CM

## 2024-05-08 DIAGNOSIS — Z74.09 IMPAIRED MOBILITY AND ADLS: Primary | ICD-10-CM

## 2024-05-08 DIAGNOSIS — M17.0 BILATERAL PRIMARY OSTEOARTHRITIS OF KNEE: ICD-10-CM

## 2024-05-08 DIAGNOSIS — R53.81 PHYSICAL DECONDITIONING: ICD-10-CM

## 2024-05-08 PROCEDURE — 97542 WHEELCHAIR MNGMENT TRAINING: CPT | Mod: GO,95 | Performed by: OCCUPATIONAL THERAPIST

## 2024-05-08 NOTE — PROGRESS NOTES
"                                                                             SEATING AND WHEELED MOBILITY ASSESSMENT    [facility]  Occupational Therapy   Date of service: May 8, 2024    Referring provider:   Selvin Riley MD     Order Date 4/23/24  Onset Date: 4/23/24    Order Details:  edward    Funding:BCBS medicare advantage    Others present at visit:  Spouse / significant other    Vendor: [employer] from [employer]    Height/Weight: 5'9\" / 146    Medical diagnosis:   Nervous and Auditory  Mild cognitive disorder     Respiratory  Pneumonia due to infectious organism, unspecified laterality, unspecified part of lung     Digestive  Slow transit constipation     Endocrine  Impaired fasting glucose  Pre-diabetes     Musculoskeletal and Integumentary  Foot swelling  Leg edema, left  Osteoarthritis of left shoulder, unspecified osteoarthritis type  Seborrheic keratoses     Urinary  Benign prostatic hyperplasia     Hematologic  Anemia     Behavioral  Bipolar I disorder, single manic episode (H)  Physical deconditioning     Other  Family history of malignant neoplasm of prostate  MCI (mild cognitive impairment)  Memory loss  Routine general medical examination at a health care facility  Status post total replacement of left shoulder  Non-recurrent unilateral inguinal hernia without obstruction or gangrene  Mildly underweight adult  History of prostate cancer     12/23/2020  Hc repair ing hernia,5+y/o,reducibl (Left) Procedure: HERNIORRHAPHY, INGUINAL, OPEN;  Surgeon: Kevin Banks MD;  Location: [facility];  Service: General  3/15/2019  [facility] reconstr total shoulder implant (Left) Procedure: LEFT TOTAL SHOULDER ARTHROPLASTY;  Surgeon: Emiliano Monet MD;  Location: [facility];  Service:    Patient concerns/goals: power wheelchair    Living environment:  Assisted living    Living environment barriers:  None      Current assistance/living environment:  Lives with spouse whom " is on hospice    Community Mobility:  Transportation: Adapted Vehicle, via [facility] or via [place]  [facility] Mobility Requirements: Medical Appointments    Current mobility equipment:  Manual wheelchair   Private pay k3    Patient Measurements- per atp    Fall Risk Screen:   Has the patient fallen 2 or more times in the last year? No      Has the patient fallen and had an injury in the past year? No      Is the patient a fall risk? Yes, department fall risk interventions implemented    ADL:   Feeding self:  ind  Grooming: ind  UE Dressing:  ind  LE Dressing:  ind  Showering/bathing: shower chair with assist  Toileting/transfer:  RTS  Functional Mobility: wheelchair or walker with assist    IADL:    Medications:ind  Meals: one meal in dining room,   Home management:  james  Driving:  family  Emergency Call system: call light    Sleep Surface/Equipment: standard bed    Services:   PCA: daily checks, 2x per week bathing assist    Evaluation     Pain assessment:  Pain present  B Knees with weightbearing that's limits walking    Cognition:  MCI     Vision: glasses    Hearing: [place]    Fatigue:  Reported Problems: limited endurance for any distance of walking    Balance:  Unsupported Sitting Balance: Uses UE for Balance  Sitting Balance in Chair: Uses UE for Balance  Standing Balance: Uses UE for Balance    Ambulation:  Level of [place]: Independent short distances only  Assistive Device(s): Cane (single end)    Transfers:   ind    Neuromuscular:  History of Pressure Sores: Yes L hip from sleeping  Current Pressure Sores: No  Able to Perform Effective Pressure Relief/Reperfusion at Seated Surface: Yes  Methods of Pressure Relief: Standing    Coordination:  Fast movements challenging    Tone:   Hypotonic    Sensation:  Sensory Deficits Reported: WNL per report      Head and Neck:  Head and Neck Position: Flexed  Head Control: Adequate    Upper Extremities  UE ROM: Shoulders limited to about 110 degrees flexion due to prior  replacement  UE Strength:  3+/5  Dominance: Right    Pelvis  Anterior/Posterior Pelvis Position: Partially Flexible, Posterior Tilt    Trunk:  Anterior/Posterior Trunk Position: Increased Thoracic Kyphosis, Partially Flexible  Left-Right Trunk Position:     Lower Extremities:  LE ROM: WFL  LE Strength:  4-/5  Foot Positioning: Plantarflexed     Impairments:  Fatigue  Muscle atrophy  Coordination  Cognition  Pain  Range of motion     Treatment diagnosis:  Impaired mobility  Impaired activities of daily living     Recommendations/Plan of care:  Patient would benefit from interventions to enhance safety and independence.  Occupational therapy intervention for  wheelchair management.    Goals:   Target date:   Patient, family and/or caregiver will verbalize/demonstrate understanding of compensatory methods /equipment to enhance functional independence and safety.    Educational assessment/barriers to learning:  Cognitive  Hearing    Treatment provided this date:   Wheelchair management, 38 minutes   Determined need for power mobility due to limitations with manual chair use especially in long hallways to dining room.  Functional ambulation not safe nor appropriate due to imbalance with falls history and knee pain.Trails of j6 with rehab seating    Response to treatment/recommendations: understanding, trusting    Goal attainment:  All goals met    Risks and benefits of evaluation/treatment have been explained.  Patient, family and/or caregiver are in agreement with Plan of Care.     Timed Code Treatment Minutes: 38  Total Treatment Time (sum of timed and untimed services): 38      Patrick Wharton is a 88 year old male who is being seen via a billable video visit.      Patient has given verbal consent for Video visit? Yes    Video Start Time: 1245    Telehealth Visit Details    Type of Service:  Telehealth    Video End Time (time video stopped): 123    Originating Location (pt. location): Home    Additional Participants in  Telehealth Visit: son amauri leatha osborn    Distant Location (provider location):   [facility]    Mode of Communication (Audio Visual or Audio Only):  audio visual    Leatha Reynoso OT  May 8, 2024       Electronically signed by:  Leatha Reynoso OTR/L, ATP      Occupational Therapist, Assistive   [phone]      fax: [phone]      [email or web address]  [place] [address]  May 8, 2024

## 2024-05-30 NOTE — PROGRESS NOTES
REQUISITION AND JUSTIFICATION FOR DURABLE MEDICAL EQUIPMENT    Patient Name:  Patrick Wharton  MR #:  [id number]  :  1935  Age/Gender:  89 year old male  Visit Date:  Patrick Wharton seen for seating and wheeled mobility evaluation by KHOA Umanzor/L, ATP and ATP from [facility]  on 2024 via video with ATP and home.    CLINICAL CRITERIA FOR MOBILITY ASSISTIVE EQUIPMENT  Coverage Criteria Per Local Coverage Determination  A) Patrick has mobility limitations due to mild cognitive disorder, prediabetes, leg edema, osteoarthritis, benign prostatic hyperplasia, left shoulder replacement, history of prostate cancer, hernia repair, with significant falls history that significantly impairs his ability to participate in all of his mobility-related activities of daily living (MRADL). Specifically affected are toileting (being able to get there in time to prevent accidents), dressing, and bathing (getting into the bathroom of designated place). Current equipment used is private pay K3 manual wheelchair that he is only able to propel with his feet backwards. This patient needs the new equipment requested to be able to allow for safe and independent participation in MRADLS in his apartment and hallways. Please see additional documentation in the seating and wheeled mobility report for details.   Patrick had a successful clinical trial here, and also a successful trial at home with the recommended equipment. Patrick is very willing and physically / cognitively able to use the recommended equipment to assist his with mobility-related activities of daily living and general mobility.   B) Patrick's mobility limitation cannot be sufficiently and safely resolved by the use of an appropriately fitted cane or walker because he is only able to ambulate short distances only with a cane. TUG results not tested due to safety concerns and significant falls history. Strength of legs is 4 -/5 for one maximal repetition. Fatigue also impacts this  patient's ability to ambulate, regardless of the gait aid.    C) Patrick does not have sufficient upper extremity function to self-propel an optimally-configured manual wheelchair in his home to perform MRADLs during a typical day due to limitations in strength, endurance, range of motion, and coordination. Distance and time to propel a light weight manual wheelchair not tested as reports and demos using feet to push backwards only.  Strength of arms is 3+/5 with limited shoulder movement due to prior replacement.  D)  Patrick is not able to use a POV/scooter because it will not fit in his home environment. Patrick is unable to safely transfer to and from a POV, unable to operate the Paystiker steering system, and unable to maintain postural stability and position while operating the POV. Patrick needs more appropriate seating and positioning than any scooter seat provides.  E) The need for this equipment is LIFETIME.     RECOMMENDATIONS FOR MOBILITY BASE, SEATING SYSTEM AND COMPONENTS  Leon CHAWLA6 - this mid wheel drive power wheelchair is needed for this patient to continue to have independent mobility and to be able to allow him to complete or assist in all of his mobility related activities of daily living (MRADLs). This wheelchair will also have the seating and positioning system and seat function he needs to be able to use and tolerate the wheelchair full time, and have functional and comfortable positioning for a full day's activities.  Patrick has mild cognitive disorder, prediabetes, leg edema, osteoarthritis, benign prostatic hyperplasia, left shoulder replacement, history of prostate cancer, hernia repair, with significant falls history which impairs his ability to move in his home without the use of the requested wheelchair.  He and his wife lives in a assisted living with level access and uses family for transportation.     2 Batteries and  - gel sealed, and two are necessary to power the wheelchair. They are  maintenance free and are safe for travel on the road or in the air. They are necessary to provide reliable use of the power wheelchair on a single charge.     Swing away joystick mount - needed to be able to move the joystick out of the way during transfers, or when at the desksing the computer, or at the table eating, so as not to inadvertently hit the joystick, thus moving the wheelchair or turning the power on or off without his knowledge.    Custom seating interface-solid surface needed to place rehab seat on to align body due to asymmetries    Julius comfort 2 SPP seat cushion - this pressure distribution and positioning seat cushion will optimally distribute seating pressures to prevent pressure ulcers, but also provide a stable base of support for him to use during MRADLs. He has a history of wounds with impaired sensation and asymmetries on their seating surface leaving them more subseptal for future skin breakdown with sitting.       Scriptickx Elite E2 Solid curved and padded back support - firm and contoured back support is needed to support Patrick's thoracolumbar area in an upright and midline position due to asymmetries that require proper alignment for safe and independent chair use. This will provide support whether he is in the upright or his position. This back support is essential to provide sufficient lateral contour to maximize his postural alignment and minimize his tendency to develop scoliosis and other secondary complications.    2 post, flip back, height adjustable, removable, full length armrests - needed for arm support at appropriate height, not available with standard armrests    Quick adjustable center mount elevating foot legrest- Allows for elevation and extension of lower extremities while elevating. This can improve circulation and prevent/reduce edema (with recline/tilt combination).  The lower legs of this wheelchair user act as a reservoir for fluid accumulation due to lack of movement.  Elevation of this patient's legs above the level of the heart (left atrium) is recommended as part of the management of edema in conjunction with other measures such as support garments  This patient has lower extremity dependent edema while sitting in his wheelchair, which resolves with elevation of his legs. This can also be helpful if he fatigues and requires rests throughout the day, without transferring him back to bed.      Calf supports- angle and height adjustable pads that attach to the legrests. These padded calf supports are necessary to support his lower legs when the leg is elevated, or to keep feet from falling behind the footplates when in the neutral seated position. Calf supports are especially important when using a tilt-in-space power seating system and/or power articulating elevating legrests. The padding provides an additional surface to distribute pressure, and has a mild contour to accommodate the lower leg.  Width extension for footplates-needed to safely support feet with chair use     This equipment is reasonable and necessary with reference to accepted standards of medical practice and treatment of this patient's condition and is not being recommended as a convenience item. Without this recommended equipment, he is highly likely to sustain injuries from falls, develop pressure sores or postural compensation, and/or be bed confined, which those costs far exceed the cost of the requested equipment.    Electronically signed by:  Loida COUCH/IRENE, ATP      Occupational Therapist, Assistive   [phone]      fax: [phone]      [email or web address], [place] [address]  May 30, 2024    I have read and concur with the above recommendations.    Physician Printed Name __________________________________________    Physician SIgnature   _____________________________________________    Date of SIgnature ______________________________    Physician Phone  ______________________________

## 2024-06-26 ENCOUNTER — OFFICE VISIT (OUTPATIENT)
Dept: FAMILY MEDICINE | Facility: CLINIC | Age: 89
End: 2024-06-26
Payer: COMMERCIAL

## 2024-06-26 ENCOUNTER — DOCUMENTATION ONLY (OUTPATIENT)
Dept: OTHER | Facility: CLINIC | Age: 89
End: 2024-06-26

## 2024-06-26 VITALS
SYSTOLIC BLOOD PRESSURE: 98 MMHG | RESPIRATION RATE: 16 BRPM | BODY MASS INDEX: 22.23 KG/M2 | WEIGHT: 150.1 LBS | TEMPERATURE: 98.3 F | HEART RATE: 78 BPM | OXYGEN SATURATION: 95 % | DIASTOLIC BLOOD PRESSURE: 64 MMHG | HEIGHT: 69 IN

## 2024-06-26 DIAGNOSIS — M25.561 CHRONIC PAIN OF RIGHT KNEE: ICD-10-CM

## 2024-06-26 DIAGNOSIS — R00.1 BRADYCARDIA: Primary | ICD-10-CM

## 2024-06-26 DIAGNOSIS — R26.2 DIFFICULTY IN WALKING: ICD-10-CM

## 2024-06-26 DIAGNOSIS — G89.29 CHRONIC PAIN OF LEFT KNEE: ICD-10-CM

## 2024-06-26 DIAGNOSIS — M25.562 CHRONIC PAIN OF LEFT KNEE: ICD-10-CM

## 2024-06-26 DIAGNOSIS — G89.29 CHRONIC PAIN OF RIGHT KNEE: ICD-10-CM

## 2024-06-26 PROCEDURE — 93005 ELECTROCARDIOGRAM TRACING: CPT | Performed by: NURSE PRACTITIONER

## 2024-06-26 PROCEDURE — 93010 ELECTROCARDIOGRAM REPORT: CPT | Performed by: INTERNAL MEDICINE

## 2024-06-26 PROCEDURE — 99213 OFFICE O/P EST LOW 20 MIN: CPT | Performed by: NURSE PRACTITIONER

## 2024-06-26 PROCEDURE — G2211 COMPLEX E/M VISIT ADD ON: HCPCS | Performed by: NURSE PRACTITIONER

## 2024-06-26 RX ORDER — RESPIRATORY SYNCYTIAL VIRUS VACCINE 120MCG/0.5
0.5 KIT INTRAMUSCULAR ONCE
Qty: 1 EACH | Refills: 0 | Status: CANCELLED | OUTPATIENT
Start: 2024-06-26 | End: 2024-06-26

## 2024-06-26 NOTE — PROGRESS NOTES
Assessment & Plan     Bradycardia  Initially patient was bradycardic with his baseline heart rate typically in the 60s to 70s.  His pulse was 41 with a low heart rate on physical exam as well.  EKG that showed a heart rate normalizing to 75 which she maintained for the remainder of the visit.  Discussed with patient to return to clinic if feeling lightheaded, chest pain, fatigue  - EKG 12-lead, tracing only    Chronic pain of left knee  Chronic pain of right knee  Chronic bilateral knee pain likely limiting his ambulation  Patient declines treatment for this at this time.  Offered a number of treatment options including analgesics and over-the-counter options versus Voltaren gel.    Difficulty in walking  Patient with a history of bipolar disorder has little motivation to maintain ambulatory status and prevent ambulatory limitations.  At this point in time patient is spending the majority of his waking hours in his wheelchair, only getting up to stand briefly for toileting and for transferring into a bed with a total of 3 brief intervals of getting out of his wheelchair to transfer today.    This provider talked extensively about risk of losing the ability to ambulate with limited repetitive rising from seated position to standing position, discussed risk of muscle atrophy, and discussed risk of decubitus ulcers.    Gave list of home exercises he can do while seated in his wheelchair to offload weight and to help prevent atrophy.    Despite this patient does not want to do physical therapy, and is very hesitant about doing home exercises.    Of note he does have a fear of falling, and does want to remain in assisted living over going to a nursing home.  Discussed with patient that my recommendation is to do physical therapy for safety and for maintaining ambulatory independence and prevent falls.    -Form completed today for his assisted living location outlining my recommendations for activity and physical  "therapy to be done at his place of living.  Discussed patient's right to decline.              Subjective   Patrick is a 89 year old, presenting for the following health issues:  Knee Pain (Patient has been in wheel chair for about a month and patient been having some knee pain)    History of Present Illness       Reason for visit:  Knee pain if walking bowl movements    He eats 0-1 servings of fruits and vegetables daily.He consumes 1 sweetened beverage(s) daily.He exercises with enough effort to increase his heart rate 9 or less minutes per day.  He exercises with enough effort to increase his heart rate 3 or less days per week.   He is taking medications regularly.     Sits in wheelchair for hours at a time gets around by scooting with feet and legs, uses arms to move himself.   Only gets out when going to bed and bathroom - total of three times in a day    Doesn't want to walk due to knee pain     5 weeks ago would use the walker to go down to dinner (once/day) - now using regular since then, now getting up for bathroom and going to bed.     At the age where not worried about getting up out of the chair.     Uses cane and walks into shower - has a bar he hangs on to and sits on shower chair - aid helps him dry off and out clothes on.    If the pain in his knees was gone he wouldn't walk more  Only has pain when he walks    No longer doing home exercises given by PT before - shoulder and getting up and sitting and down.     Bipolar -   Hx of psychiatric admission  On depakote 250mg  Taking mirtazapine 30mg at night  \"Already set a new family record for age\"   \" I'm on a one way street, why fight it\"  \"I could consider doctor assisted suicide\"    No longer doing the stretching exercises    Lives in assisted living - daughter visits him every other day.  Ok with switching into nursing home.                     Objective    BP 98/64   Pulse (!) 41   Temp 98.3  F (36.8  C) (Oral)   Resp 16   Ht 1.753 m (5' 9\")   Wt " 68.1 kg (150 lb 1.6 oz)   SpO2 95%   BMI 22.17 kg/m    Body mass index is 22.17 kg/m .  Physical Exam   GENERAL: alert and no distress, seated in his home wheelchair with a wheelchair pad, frail elderly  RESP: lungs clear to auscultation - no rales, rhonchi or wheezes  CV: Slow rate rate and regular rhythm, normal S1 S2, no S3 or S4, no murmur, click or rub  MS: Muscle atrophy throughout, patient able to go from sitting to standing although not able to fully extend bilateral knees, patient falls into chair to sit back down, pushes off the arms of the wheelchair to get up, able to stand for 30 seconds before needing to sit down            Signed Electronically by: LYNN Martell CNP

## 2024-06-28 LAB
ATRIAL RATE - MUSE: 75 BPM
DIASTOLIC BLOOD PRESSURE - MUSE: NORMAL MMHG
INTERPRETATION ECG - MUSE: NORMAL
P AXIS - MUSE: 56 DEGREES
PR INTERVAL - MUSE: 170 MS
QRS DURATION - MUSE: 84 MS
QT - MUSE: 366 MS
QTC - MUSE: 408 MS
R AXIS - MUSE: -16 DEGREES
SYSTOLIC BLOOD PRESSURE - MUSE: NORMAL MMHG
T AXIS - MUSE: 20 DEGREES
VENTRICULAR RATE- MUSE: 75 BPM

## 2024-07-12 ENCOUNTER — TELEPHONE (OUTPATIENT)
Dept: FAMILY MEDICINE | Facility: CLINIC | Age: 89
End: 2024-07-12
Payer: COMMERCIAL

## 2024-07-12 ENCOUNTER — HOSPITAL ENCOUNTER (OUTPATIENT)
Dept: ULTRASOUND IMAGING | Facility: HOSPITAL | Age: 89
Discharge: HOME | End: 2024-07-12
Attending: FAMILY MEDICINE | Admitting: FAMILY MEDICINE
Payer: COMMERCIAL

## 2024-07-12 DIAGNOSIS — M79.89 RIGHT LEG SWELLING: Primary | ICD-10-CM

## 2024-07-12 DIAGNOSIS — M79.89 LEFT LEG SWELLING: Primary | ICD-10-CM

## 2024-07-12 DIAGNOSIS — M79.89 LEFT LEG SWELLING: ICD-10-CM

## 2024-07-12 PROCEDURE — 93970 EXTREMITY STUDY: CPT

## 2024-07-12 NOTE — TELEPHONE ENCOUNTER
Vaishali, patient's daughter, CTC on file, called back into the clinic to request an additional ultrasound for the patient's right leg to rule out a DVT as she stated that the patient's right leg is also swollen, although not as swollen as the left leg.    Patient has an ultrasound appointment scheduled for today, Friday, 7/12/24, at 4:30 pm, at the [facility] and Vaishali is hoping that the provider would be able to order an additional ultrasound for the patient's right leg prior to the visit today at 4:30 pm.    Writer will pend the additional ultrasound order for the provider to review and advise next steps.    Kathy Duckworth RN, BSN  [facility]

## 2024-07-12 NOTE — TELEPHONE ENCOUNTER
Notify patient that ultrasound left leg rule out DVT was ordered.  Should be able to call [phone] in order to schedule.  Complete prior to upcoming follow-up visit with Dr. Riley as noted on 7/16/24.

## 2024-07-12 NOTE — TELEPHONE ENCOUNTER
See provider's response on 7/12/24 to patient's daughter, Vaishali's, CTC on file, request for ultrasound on 7/12/24.    Writer called Vaishali and relayed the above information to Vaishali, who verbalized understanding and agrees with the plan.    Writer also relayed the above contact phone number to Vaishali for [facility] at [phone].    Denies other questions or concerns at this time.    Kathy Duckworth, RN, BSN  [facility]

## 2024-07-12 NOTE — TELEPHONE ENCOUNTER
FYI - Status Update    Who is Calling: nurseJenny    Update: Increased edema in lower left extremity change in the past month. Pt denies any pain and shortness of breath. Weight has gone up about 3 pounds in the last 2 months. Daughter is aware of this.    Does caller want a call/response back: N/A     Can call they need too. Please call [phone]

## 2024-07-12 NOTE — TELEPHONE ENCOUNTER
Writer huddled with Dr. Rico regarding the patient's daughter, Morgan, CTC on file, request for an additional ultrasound on the patient's right leg on 7/12/24.    Dr. Rico ordered an ultrasound of the patient's right leg to rule out DVT.    Writer called Vaishali and relayed the above information to Vaishali, who verbalized understanding and agrees with the plan.    Denies other questions or concerns at this time.    Kathy Duckworth, RN, BSN  [facility]

## 2024-07-12 NOTE — TELEPHONE ENCOUNTER
Order/Referral Request    Who is requesting: Vaishali (Daughter) CTC on file     Orders being requested: Ultrasound    Reason service is needed/diagnosis: wanting to rule out DVT    When are orders needed by: Daughter would like to have the ultrasound completed before patient is seen on 7/16.     Has this been discussed with Provider: No    Does patient have a preference on a Group/Provider/Facility? none    Does patient have an appointment scheduled?: Yes: 7/16/24 with Dr. Riley     Where to send orders: Place orders within Epic    [facility] or would you prefer to receive a phone call?:   Patient would prefer a phone call   Okay to leave a detailed message?: Yes at Other phone number:  [phone]    Jesse Rodriguez RN  [facility]

## 2024-07-16 ENCOUNTER — OFFICE VISIT (OUTPATIENT)
Dept: FAMILY MEDICINE | Facility: CLINIC | Age: 89
End: 2024-07-16
Payer: COMMERCIAL

## 2024-07-16 VITALS
DIASTOLIC BLOOD PRESSURE: 66 MMHG | HEART RATE: 67 BPM | SYSTOLIC BLOOD PRESSURE: 134 MMHG | TEMPERATURE: 97.6 F | OXYGEN SATURATION: 97 %

## 2024-07-16 DIAGNOSIS — M79.89 RIGHT LEG SWELLING: ICD-10-CM

## 2024-07-16 DIAGNOSIS — M79.89 LEFT LEG SWELLING: ICD-10-CM

## 2024-07-16 DIAGNOSIS — Z99.3 USES WHEELCHAIR: ICD-10-CM

## 2024-07-16 DIAGNOSIS — K59.00 CONSTIPATION, UNSPECIFIED CONSTIPATION TYPE: ICD-10-CM

## 2024-07-16 DIAGNOSIS — Z87.2 HISTORY OF PRESSURE INJURY OF SKIN: ICD-10-CM

## 2024-07-16 DIAGNOSIS — R53.81 PHYSICAL DECONDITIONING: Primary | ICD-10-CM

## 2024-07-16 DIAGNOSIS — R00.1 BRADYCARDIA: ICD-10-CM

## 2024-07-16 DIAGNOSIS — G31.84 MCI (MILD COGNITIVE IMPAIRMENT): ICD-10-CM

## 2024-07-16 PROCEDURE — 99214 OFFICE O/P EST MOD 30 MIN: CPT | Performed by: FAMILY MEDICINE

## 2024-07-16 ASSESSMENT — PAIN SCALES - GENERAL: PAINLEVEL: MODERATE PAIN (4)

## 2024-07-16 NOTE — PROGRESS NOTES
Patrick Wharton  /66   Pulse 67   Temp 97.6  F (36.4  C)   SpO2 97%      Assessment/Plan:                Patrick was seen today for leg pain and ear problem.    Diagnoses and all orders for this visit:    Physical deconditioning  -     Physical Therapy  Referral; Future  -     Occupational Therapy  Referral; Future    Left leg swelling    Right leg swelling    Bradycardia    MCI (mild cognitive impairment)    Constipation, unspecified constipation type    History of pressure injury of skin         DISCUSSION  Reduce MiraLAX dose to 1 dose daily consistently and a second dose only if needed.    Arrange for physical and Occupational Therapy at his place of residence.    Continue to monitor swelling take steps is much as possible to elevate feet when able.  Increase mobility will likely improve.  If there are concerning symptoms we will consider further evaluation.      Provided reassurance regarding bradycardia.    No evidence of any pressure injury to the skin at this time.    Continue to monitor mood and cognition lives in an appropriate situation at this time.  I do not feel that there is any compelling reason to change or add to mental health medication management.      Subjective:     HPI:    Patrick Wharton is a 89 year old male who is here today with his son and his daughter is on the phone.  He resides in an assisted living facility.  His wife  just a couple of months ago.    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  He has a history of bipolar disorder.    More recently has had evolving mild cognitive impairment.  He follows with neurology.  He is on Exelon, Depakote and mirtazapine currently.  He seems to be functioning well in his current environment.  He is often expressed ideas that he will not live forever.  His family is concerned he may be depressed in terms of expressing these types of thoughts.  He does not really seem to have a lot of other outward thoughts of  depression and he I will forward denies any depression symptoms.  We talked about this today.  There is been no significant decline in his cognitive abilities that is brought forth as a concern at this time.    He has constipation.  He has been very fixated on bowel regiment in the past use stimulant laxative regularly now has changed over to using MiraLAX but takes 2 doses daily.  Complains of loose stool and explosive gas at times.  Discussed scaling back to 1 dose consistently daily and then using a second dose only if needed.    He has bilateral lower extremity edema.  The left leg is slightly more than right which is somewhat needed.  Last week due to the evolution of this concern he was sent for an ultrasound which showed no DVT.  He does not have a history of heart failure.  His leg edema has been chronic and relatively stable although perhaps slightly worse.  It has not noted any significant weight gain he does not complain of shortness of breath.  He has been noted to be somewhat bradycardic but his heart rate is variable and will come up to a normal rate at times he does not have symptoms of bradycardia.  Overall provided reassurance in this regard.  Discussed continued monitoring of swelling.  Do feel that he would benefit from increased mobility which would likely help reduce what is highly likely to be dependent edema.  Discussed continued efforts to elevate the feet.  If redness develops or pain develops would need reevaluation.  Discussed the possibility of compression stockings he declines.  After some coaxing he is agreeable to starting physical/occupational therapy to help with increasing movement.    He has been fitted for a wheelchair and now spends most of his time in the wheelchair.  Today we spent a significant amount of time discussing the benefits of maintaining mobility is much as possible.  Ultimately agreeable to therapy as mentioned above.    He does have a history of a pressure injury  in the hip area.  Certainly there is concern given he is seated most of the time currently we did an evaluation today.  No pressure injuries are noted.    Uses a hearing aid concerned regarding earwax buildup.    ROS:  Complete review of systems is obtained.  Other than the specific considerations noted above complete review of systems is negative.          Objective:   Medications:  Current Outpatient Medications   Medication Sig Dispense Refill    divalproex sodium delayed-release (DEPAKOTE) 250 MG DR tablet Take 1 tablet (250 mg) by mouth daily 90 tablet 3    meclizine (ANTIVERT) 25 MG tablet Take 1 tablet (25 mg) by mouth 3 times daily as needed for dizziness 90 tablet 0    mirtazapine (REMERON) 30 MG tablet Take 1 tablet (30 mg) by mouth At Bedtime 90 tablet 3    multivitamin w/minerals (THERA-VIT-M) tablet Take 1 tablet by mouth      Polyethylene Glycol 3350 (MIRALAX PO) Take 1 Dose by mouth 2 times daily      Psyllium (METAMUCIL FIBER) 51.7 % PACK Take 1 packet by mouth      rivastigmine (EXELON) 3 MG capsule TAKE 1 CAPSULE BY MOUTH TWICE DAILY 180 capsule 3     No current facility-administered medications for this visit.        Allergies:   No Known Allergies     Social History     Socioeconomic History    Marital status:      Spouse name: Not on file    Number of children: Not on file    Years of education: Not on file    Highest education level: Not on file   Occupational History    Not on file   Tobacco Use    Smoking status: Never     Passive exposure: Past    Smokeless tobacco: Never   Vaping Use    Vaping status: Never Used   Substance and Sexual Activity    Alcohol use: No    Drug use: No    Sexual activity: Not on file   Other Topics Concern    Parent/sibling w/ CABG, MI or angioplasty before 65F 55M? No   Social History Narrative    Not on file     Social Determinants of Health     Financial Resource Strain: Unknown (2/5/2024)    Financial Resource Strain     Within the past 12 months, have  you or your family members you live with been unable to get utilities (heat, electricity) when it was really needed?: Patient declined   Food Insecurity: Unknown (2/5/2024)    Food Insecurity     Within the past 12 months, did you worry that your food would run out before you got money to buy more?: Patient declined     Within the past 12 months, did the food you bought just not last and you didn t have money to get more?: Patient declined   Transportation Needs: Unknown (2/5/2024)    Transportation Needs     Within the past 12 months, has lack of transportation kept you from medical appointments, getting your medicines, non-medical meetings or appointments, work, or from getting things that you need?: Patient declined   Physical Activity: Not on file   Stress: Not on file   Social Connections: Not on file   Interpersonal Safety: Low Risk  (3/5/2024)    Interpersonal Safety     Do you feel physically and emotionally safe where you currently live?: Yes     Within the past 12 months, have you been hit, slapped, kicked or otherwise physically hurt by someone?: No     Within the past 12 months, have you been humiliated or emotionally abused in other ways by your partner or ex-partner?: No   Housing Stability: Unknown (2/5/2024)    Housing Stability     Do you have housing? : Patient declined     Are you worried about losing your housing?: Patient declined       Family History   Problem Relation Age of Onset    Heart Disease Mother     Alzheimer Disease Mother     Heart Failure Mother     Cancer Father         Most Recent Immunizations   Administered Date(s) Administered    COVID-19 12+ (2023-24) (Pfizer) 12/11/2023    COVID-19 Monovalent 18+ (Moderna) 01/14/2023    DT (PEDS <7y) 08/18/2005    Flu 65+ Years 08/26/2019    Flu, Unspecified 08/25/2020    Influenza (H1N1) 12/23/2009    Influenza (High Dose) 3 valent vaccine 09/17/2018    Influenza (IIV3) PF 10/14/2013    Influenza Vaccine 65+ (FLUAD) 10/03/2022    Influenza  Vaccine 65+ (Fluzone HD) 09/29/2023    Influenza Vaccine >6 months,quad, PF 11/17/2014, 11/17/2014    Influenza Vaccine, 6+MO IM (QUADRIVALENT W/PRESERVATIVES) 10/14/2013    Influenza, seasonal, injectable, PF 10/07/2010    Pneumo Conj 13-V (2010&after) 11/22/2016    Pneumococcal 23 valent 11/17/2014    TDAP (Adacel,Boostrix) 11/24/2017    Td (Adult), Adsorbed 08/18/2005    Td,adult,historic,unspecified 08/18/2005    Zoster recombinant adjuvanted (SHINGRIX) 01/03/2020        Wt Readings from Last 3 Encounters:   06/26/24 68.1 kg (150 lb 1.6 oz)   04/03/24 66.6 kg (146 lb 12.8 oz)   03/05/24 63.5 kg (140 lb)        BP Readings from Last 6 Encounters:   07/16/24 134/66   06/26/24 98/64   04/03/24 106/71   03/05/24 118/68   02/23/24 104/67   02/05/24 98/60        Hemoglobin A1C   Date Value Ref Range Status   12/11/2023 5.7 (H) 0.0 - 5.6 % Final     Comment:     Normal <5.7%   Prediabetes 5.7-6.4%    Diabetes 6.5% or higher     Note: Adopted from ADA consensus guidelines.   12/09/2022 5.8 (H) 0.0 - 5.6 % Final     Comment:     Normal <5.7%   Prediabetes 5.7-6.4%    Diabetes 6.5% or higher     Note: Adopted from ADA consensus guidelines.   10/05/2021 5.3 0.0 - 5.6 % Final     Comment:     Normal <5.7%   Prediabetes 5.7-6.4%    Diabetes 6.5% or higher     Note: Adopted from ADA consensus guidelines.              PHYSICAL EXAM:    /66   Pulse 67   Temp 97.6  F (36.4  C)   SpO2 97%          General Appearance:    Alert, cooperative, no distress   Eyes:   No scleral icterus or conjunctival irritation       Ears:  Bilateral ceruminosis   Lungs:     Clear to auscultation bilaterally, respirations unlabored, no wheezes or crackles   Heart:    Regular rate and rhythm,  No murmur   Abdomen:    Soft, no distention, no tenderness on palpation,      Extremities: Bilateral lower extremity edema left is greater than right.  Goes up to the upper shin region.  No redness no significant pain on palpation.  There is pitting it  is about +2.   Skin:  No concerning skin findings, no suspicious moles, no rashes   Neurologic:  On gross examination there is no motor or sensory deficit.

## 2024-07-21 NOTE — PROGRESS NOTES
Assessment & Plan   Problem List Items Addressed This Visit       Physical deconditioning - Primary     Overall doing well.  Using walker full-time along with fourposter cane and tight areas.  Of note even after physical therapy his strength is such that using a normal toilet or 1 without handrails or elevated seat he cannot get back up on his own.    Continue with assistive devices.              See Patient Instructions      Aylin Nguyen is a 88 year old, presenting for the following health issues:  Follow Up (Left Hip Pressure Sore (pt states it is OK now))      4/3/2024    12:29 PM   Additional Questions   Roomed by MEJIA Johnson   Accompanied by Daughter   Failed to redirect to the Timeline version of the RE[facility] SmartLink.      4/3/2024    12:29 PM   Patient Reported Additional Medications   Patient reports taking the following new medications N/A     Patient presents today for checkup on paperwork and also follow-up on left hip pressure sore.  Pressure ulcer has fully healed.  He is now alternating from 1 side to the other each night as he sleeps as he was starting to get some redness and tenderness on the other hip after he changed sides.  Now that he is alternating between the 2 it is working very well.  He has had some cognitive decline and paperwork was recently completed by his PCP and sent in.  They did not realize that the paperwork had been sent in.  They did talk to transmitter Today and they did receive the paperwork, however it sounds like they are going to deny the claim and that the patient will need to appeal.    Daughter is with him today.  Of note she is feels little frustrated of the denial with appeal especially on her mother side where in mom's disability claim they ask about whether or not she can do 3 of 6 ADLs.  Of note though 1 of is using the bathroom on their own.  Of note he can but he can only do so when they are assistive devices in place to include handrails and elevated toilet  "seat.  If he goes out or even here in clinic where there are normal-sized toilet seats even with handrails he has difficulty getting up on his own and requires assistance.  At home however with an elevated seat and side rails and a doorway that actually fits his walker he is able to do it on his own.    History of Present Illness       Reason for visit:  F/U Left Hip Pressure Sore    He eats 0-1 servings of fruits and vegetables daily.He consumes 1 sweetened beverage(s) daily.He exercises with enough effort to increase his heart rate 9 or less minutes per day.  He exercises with enough effort to increase his heart rate 3 or less days per week.   He is taking medications regularly.         Objective    /71 (BP Location: Left arm, Patient Position: Sitting, Cuff Size: Adult Large)   Pulse 67   Temp 97.5  F (36.4  C) (Oral)   Resp 16   Ht 1.753 m (5' 9\")   Wt 66.6 kg (146 lb 12.8 oz)   SpO2 97%   BMI 21.68 kg/m    Body mass index is 21.68 kg/m .  Physical Exam  Vitals and nursing note reviewed.   Constitutional:       General: He is not in acute distress.     Appearance: Normal appearance. He is not ill-appearing.   HENT:      Head: Normocephalic and atraumatic.   Eyes:      Extraocular Movements: Extraocular movements intact.      Conjunctiva/sclera: Conjunctivae normal.   Pulmonary:      Effort: Pulmonary effort is normal.   Neurological:      Mental Status: He is alert and oriented to person, place, and time.   Psychiatric:         Attention and Perception: Attention normal.         Mood and Affect: Mood normal.         Speech: Speech normal.         Thought Content: Thought content normal.                Signed Electronically by: Gurjit Esposito,     " Previously a  in [place], currently makes oils

## 2024-07-24 ENCOUNTER — MEDICAL CORRESPONDENCE (OUTPATIENT)
Dept: HEALTH INFORMATION MANAGEMENT | Facility: CLINIC | Age: 89
End: 2024-07-24
Payer: COMMERCIAL

## 2024-07-29 DIAGNOSIS — R42 DIZZINESS: ICD-10-CM

## 2024-07-29 RX ORDER — MECLIZINE HYDROCHLORIDE 25 MG/1
25 TABLET ORAL 3 TIMES DAILY PRN
Qty: 90 TABLET | Refills: 0 | Status: SHIPPED | OUTPATIENT
Start: 2024-07-29

## 2024-08-12 ENCOUNTER — OFFICE VISIT (OUTPATIENT)
Dept: NEUROLOGY | Facility: CLINIC | Age: 89
End: 2024-08-12
Payer: COMMERCIAL

## 2024-08-12 VITALS — SYSTOLIC BLOOD PRESSURE: 90 MMHG | HEART RATE: 75 BPM | DIASTOLIC BLOOD PRESSURE: 50 MMHG

## 2024-08-12 DIAGNOSIS — G31.84 MCI (MILD COGNITIVE IMPAIRMENT): ICD-10-CM

## 2024-08-12 PROCEDURE — G2211 COMPLEX E/M VISIT ADD ON: HCPCS | Performed by: PSYCHIATRY & NEUROLOGY

## 2024-08-12 PROCEDURE — 99214 OFFICE O/P EST MOD 30 MIN: CPT | Performed by: PSYCHIATRY & NEUROLOGY

## 2024-08-12 RX ORDER — RIVASTIGMINE TARTRATE 3 MG/1
3 CAPSULE ORAL 2 TIMES DAILY
Qty: 180 CAPSULE | Refills: 3 | Status: SHIPPED | OUTPATIENT
Start: 2024-08-12

## 2024-08-12 RX ORDER — DIVALPROEX SODIUM 250 MG/1
250 TABLET, DELAYED RELEASE ORAL DAILY
Qty: 90 TABLET | Refills: 3 | Status: SHIPPED | OUTPATIENT
Start: 2024-08-12

## 2024-08-12 ASSESSMENT — MONTREAL COGNITIVE ASSESSMENT (MOCA)
12. MEMORY INDEX SCORE: 0
7. [VIGILENCE] TAP WHEN HEARING DESIGNATED LETTER: 1
WHAT LEVEL OF EDUCATION WAS ATTAINED: 0
9. REPEAT EACH SENTENCE: 1
11. FOR EACH PAIR OF WORDS, WHAT CATEGORY DO THEY BELONG TO (OUT OF 2): 2
6. READ LIST OF DIGITS [FORWARD/BACKWARD]: 2
10. [FLUENCY] NAME WORDS STARTING WITH DESIGNATED LETTER: 0
WHAT IS THE TOTAL SCORE (OUT OF 30): 19
13. ORIENTATION SUBSCORE: 3
8. SERIAL SUBTRACTION OF 7S: 2
4. NAME EACH OF THE THREE ANIMALS SHOWN: 3
VISUOSPATIAL/EXECUTIVE SUBSCORE: 5

## 2024-08-12 NOTE — NURSING NOTE
JULIAN COGNITIVE ASSESSMENT (MOCA)  Version 7.1 Original Version  VISUOSPATIAL/EXECUTIVE               COPY CUBE      [  1  ]                                [  1  ] DRAW CLOCK (Ten past eleven)  (3 points)    [  1  ]                    [  1  ]               [  1  ]       Contour            Numbers     Hands POINTS                 5  / 5   NAMING    [  1 ]                                                                        [ 1   ]                                             [    1]  Lishabnam Hickey                                Camel                  3   / 3   MEMORY Read list of words, subject must repeat them. Do 2 trials, even if 1st trial is successful. Do a recall after 5 minutes  FACE VELVET [employer] FIDEL RED No Points    1st          2nd         ATTENTION Read list of digits (1 digit/sec) Subject has to repeat in the forward order       [ 1   ]   2  1  8  5  4                                [   1 ] 7 4 2                      2    /2   Read list of letters. The subject must tap with his hand at each letter A. No points if > 2 errors.  [   1 ] F B A C M N A A J K L B A F A K D E A A A J A M O F A A B            1  /1   Serial 7 subtraction starting at 100          [   1 ] 93         [  1  ] 86          [ 1   ] 79          [    ] 72         [    ] 65   4 or 5 correct subtractions: 3 points,  2 or 3 correct: 2 points,  1correct: 1 point,   0 correct: 0 points            /3   LANGUAGE Repeat: I only know that Patrick is the one to help today. [ 0    ]                                      The cat always hid under the couch when dogs were in the room. [1   ]          1     /2   Fluency: Name maximum number of words in one minute that begin with the letter F                                                                                                                    [ 0   ] ___ (N > 11 words)              0 /1   ABSTRACTION Similarity  between e.g. banana-orange=fruit                                                                   [ 1   ] train-bicycle                      [  1  ] watch-ruler         2     /2   DELAYED  RECALL Has to recall words  WITH NO CUE FACE  [    ] VELVET  [    ] Yarsani  [    ]  FIDEL  [    ] RED  [    ] Points for UNCUED recall only          0  /5           OPTIONAL Category cue           Multiple choice cue          ORIENTATION  [  0  ] Date     [  0  ] Month       [  1  ] Year      [ 0   ] Day      [  1  ] Place        [  1  ] City         3 /6   TOTAL  Normal > 26/30 Add 1 point if < 12 years education    19 /30

## 2024-08-12 NOTE — NURSING NOTE
"Patrick Wharton is a 89 year old male who presents for:  Chief Complaint   Patient presents with    Memory Loss     Patient has been using a wheelchair more.  Patient wife passed away in May.   Memory is about the same.   19/30 MOCA        Initial Vitals:  BP 90/50   Pulse 75  Estimated body mass index is 22.17 kg/m  as calculated from the following:    Height as of 6/26/24: 1.753 m (5' 9\").    Weight as of 6/26/24: 68.1 kg (150 lb 1.6 oz).. There is no height or weight on file to calculate BSA. BP completed using cuff size: wrist cuff    Sundar V. Donte  "

## 2024-08-12 NOTE — LETTER
8/12/2024      Patrick Wharton  [address]  [place] [address]      Dear Colleague,    Thank you for referring your patient, Patrick Wharton, to the [facility] [place]. Please see a copy of my visit note below.    In person evaluation    HPI  January 1, 2020, in person consultation transfer of care from Dr. Vikas Mcginnis  4/27/2020, telephone visit  8/3/2020, telephone visit  11/6/2020, video visit  8/13/2021, in person visit  2/16/2022, in person visit  8/19/2022, in person visit  3/13/2023, in person visit  11/3/2023, in person visit  8/12/2024, in person visit    89-year-old followed neurologically for:  Cognitive decline (memory loss)  Onset as far back as 2015  Difficulty remembering people's names  Previously cared for by Dr. Vikas Mcginnis  History of bipolar disorder  Abnormal EEG      Since last seen November 2023  ENT visit 12/12/2023 dizziness episodic/stable hearing loss on right side/Ménière's disease left side  PMD visit 2/5/2024 pressure ulcer left hip  NP visit 6/26/2024 (mobility difficulty/imbalance)  PMD visit 7/16/2024 physical deconditioning ordered PT/OT  No hospitalizations  No surgeries    8/12/2024,    19 out of 30  Patient has been in the wheelchair more due to poor balance  Patient's wife passed away in May 2024 which is a major stressor        Lives in assisted living  Decreased mobility  Transferring from wheelchair to bed    Does have dizziness/vertigo  Has significant hearing loss left ear is very bad, right ear has a hearing aid and it kind of works  Patient does better when he can read lips     No hallucinations/no vivid dreams  Gets up once at nighttime to go to the bathroom  Uses walker/wheelchair  Sometimes if he moves too quick he will have a little bit of dizziness    No faintness  Has some difficulty with bradycardia  No diarrhea    Only eats 1 meal a day but that is his choice  No hallucinations no vivid dreams    Walks okay with the 4  wheeled walker with hand break  Family thought that maybe he fatigues with the 25-minute walk  Back when he had COVID a year before it kind of took a lot out of him but he rallied a bit      Mirtazapine 30 mg at nighttime to help with sleep prescribed by other physicians    Forgetfulness is about the same although 8 years have gone by, it has fallen off a bit.              A.  Mild cognitive impairment       Onset as far back as 2015       Trouble remembering people's names       Past abnormal EEG       Previously cared for by Dr. Vikas Mcginnis       Mother had some dementia the last year of life when she lived to be 90       Scored a 23 out of 30, (8/13/2021)       Scored a 25 out of 30, (January 2020)       Subtle change over a year and a half         We discussed cognitive decline       Exelon 3 mg 1 tab p.o. twice daily      Depakote 250 mg 1 p.o. nightly        No GERD no diarrhea no lightheadedness no black tarry stools no blood in the stool      Does have severe hearing loss at baseline      B.  Has history of bipolar disorder        Neurologic review from original visit August 2021  Patient feels that he has had a falloff in his memory has more trouble tracking and following conversations  Used to go to a group with his retired friends from [employer] but they cannot get together due to Covid  Has severe hearing loss in both ears  Hearing doctor thought that they could maybe make a hearing aid that would work on the left ear but the not sure  With the mask on and talking to him he misses a lot of the conversation though  I am sure this is causing some difficulty with his memory recall and tracking conversations    Patient is still coherent can should chat about multiple topics  Can jump from topic to topic fairly easy  Does better when we talk about more things from the past  Scored a 23 out of 30, (8/13/2021)  Scored a 25 out of 30, (January 2020)  Subtle change over a year and a half        Neurologic  summary:  Previously followed by Dr. Vikas Mcginnis  Mild cognitive impairment as far back as   Patient works for [employer] as a   Retired in   [employer] testing 2020, 25 out of 30  Seems to have difficulty remembering people's names  Has significant hearing loss right greater than left left is not enough to be helped by hearing aid  Past history of bipolar disorder  Mother with dementia in the last year of her life at age 90  Uncle last year of life age 88 developed dementia  In the distant past of been on Depakote for 2 to 3 years for bipolar disorder  Has an abnormal EEG  Patient restarted on Depakote   Talked about the pros and cons of using this to maybe stabilize the temporal lobe see if his symptoms are little less severe may be slow down the progression  His daughter has depression and is on Depakote    Patient tends to be quite active rows or exercises on the exercise bike regularly  Watches his diet hemoglobin A1c's have been good  Tries to stay active volunteers and participates in activities      Past medical history  Mild cognitive impairment  Bipolar disorder  Prostate cancer  Benign prostatic hypertrophy  Seborrheic dermatitis  Osteoarthritis with shoulder surgery  Left leg edema    Habits   Non-smoker  Does not drink alcohol  Worked as a [employer]  retired in   Lives in [facility] apartment with elevator  Does exercise regularly stays active      Family history  Mother had glaucoma congestive heart failure dementia  about age 90  Father  at 34 with testicular cancer  He has 1 sister with some diabetes  Daughter with depression treated with Depakote  Uncle with difficulty with memory at the age of 88 last year of his life      Work-up review  MRI scan brain 2016 mild to moderate atrophy progressed mildly from   Laboratory data 2019  Sodium 140 potassium 4.9 BUN 8 creatinine 0.8 glucose 94  White blood count 6.3 hemoglobin 13.2 platelets 313,000  Hemoglobin A1c  5.8%  Outside records from Dr. Mcginnis January 2018, January 2018 reviewed  EEG January 21, 2020 rare F7/T3 sharp waves with RAINE activity with hyperventilation mild to moderately abnormal  B12 689 ,TSH 1.57, (1/2020)    [employer]  1/20/2020,    25 out of 30   8/13/2021,   23 out of 30   8/19/2022,   22 out of 30  3/13/2023,    24 out of 30  8/12/2024,    19 out of 30    Laboratory data review                      12/2022          5/2023        3/2024  NA/K             138/4.6          136/4.2       137/4.8  BUN/Cr          3.7/0.73        6/0.82          21.4/0.74  GLU               91                  87              100  AST               30                                    28  WBC/HGB     5.7/13.2         5.3/13.3     11.3/13.3  PLTs              226,000         227,000      25,000  HGBA1C        5.8                5.9  B12                                                        718  TSH                                                       1.52    Exam    Review of system   Pertinent positives and negatives  Chronic hearing loss left much worse than right has a hearing aid on the right  Reads lips    No hallucinations  No vivid dreams    Dizziness periodically  No diplopia  No dysarthria  No dysphagia  No diarrhea  No tremor  No chest pain no shortness of breath  No headache      Ataxia uses the walker and cane  Discussed gait safety    Otherwise review systems negative    General exam  Blood pressure 90/50, pulse 75  Alert oriented x3  HEENT significant for hearing loss in left ear and right ear poor hearing and has a hearing aid  Lungs clear  Abdomen soft  No edema the feet  Patient fan only eats 1 meal per day goes down to the cafeteria with his wheelchair being pushed    Neurologic exam  Alert orient x3  Normal prosody speech  Normal naming  Normal comprehension  Normal repetition  No aphasia  No neglect  [employer]  3/13/2023,    24 out of 30  8/12/2024,    19 out of 30    Cranial 2 through 12 are significant  for  Deaf in left ear significant hearing loss of the other  No ophthalmoplegia  No nystagmus  Face symmetrical  Tongue twisters okay  Visual fields intact    Upper extremities  No drift no tremor normal finger-nose  Has chronic decreased range of motion of the shoulders right worse than left previous shoulder surgery    Lower extremities  Distal proximal strength good    Gait  Slow to get up pushing off with both hands on the wheelchair  Stands next to the wheelchair with some standby assistance slowly tries to march in place but has a little bit of retropulsion  Has flexed posture  High risk for falls mainly spends most of his time in the wheelchair but does transfer              Assessment/Plan     1.  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)        Mild cognitive impairment      Onset as far back as 2015       Family history of dementia in the later years mom and uncle       History of bipolar disorder       Abnormal EEG F7/T3 sharp wave    8/12/2024,    19 out of 30           Depakote 250 mg 1 tablet nightly       Exelon 3 mg capsule twice daily (added 8/13/2021)    Tolerating medication    Discussed neurodegenerative disease and progression  Discussed as time goes on there is less neurologic reserve  With significant hearing loss this can affect his cognition also  Discussed that more meds may not necessarily make things better.  Discussed gait safety   Patient's wife passed away May 2024    Refilled medication  Exelon/Depakote    family prefers to follow-up in about a year    Medications refilled  Discussed multiple issues as above  Continue medications the same  Total care time today 30 minutes  The longitudinal plan of care for the diagnosis(es)/condition(s) as documented were addressed during this visit. Due to the added complexity in care, I will continue to support Patrick in the subsequent management and with ongoing continuity of care.        As part of visit today  Reviewed multiple  notes as below  Reviewed laboratory data  ENT visit 12/12/2023 dizziness episodic/stable hearing loss on right side/Ménière's disease left side  PMD visit 2/5/2024 pressure ulcer left hip  NP visit 6/26/2024 (mobility difficulty/imbalance)  PMD visit 7/16/2024 physical deconditioning ordered PT/OT      Again, thank you for allowing me to participate in the care of your patient.        Sincerely,        chandler Rene MD

## 2024-08-29 ENCOUNTER — MEDICAL CORRESPONDENCE (OUTPATIENT)
Dept: HEALTH INFORMATION MANAGEMENT | Facility: CLINIC | Age: 89
End: 2024-08-29
Payer: COMMERCIAL

## 2024-10-02 ENCOUNTER — TELEPHONE (OUTPATIENT)
Dept: NEUROLOGY | Facility: CLINIC | Age: 89
End: 2024-10-02

## 2024-10-02 ENCOUNTER — OFFICE VISIT (OUTPATIENT)
Dept: FAMILY MEDICINE | Facility: CLINIC | Age: 89
End: 2024-10-02
Payer: COMMERCIAL

## 2024-10-02 VITALS
OXYGEN SATURATION: 97 % | DIASTOLIC BLOOD PRESSURE: 77 MMHG | TEMPERATURE: 97.9 F | HEART RATE: 70 BPM | WEIGHT: 143.9 LBS | SYSTOLIC BLOOD PRESSURE: 103 MMHG | RESPIRATION RATE: 12 BRPM | BODY MASS INDEX: 21.25 KG/M2

## 2024-10-02 DIAGNOSIS — F30.9 BIPOLAR I DISORDER, SINGLE MANIC EPISODE (H): ICD-10-CM

## 2024-10-02 DIAGNOSIS — F03.B0 MODERATE DEMENTIA WITHOUT BEHAVIORAL DISTURBANCE, PSYCHOTIC DISTURBANCE, MOOD DISTURBANCE, OR ANXIETY, UNSPECIFIED DEMENTIA TYPE (H): ICD-10-CM

## 2024-10-02 DIAGNOSIS — R60.0 LOWER EXTREMITY EDEMA: Primary | ICD-10-CM

## 2024-10-02 PROCEDURE — G2211 COMPLEX E/M VISIT ADD ON: HCPCS | Performed by: PHYSICIAN ASSISTANT

## 2024-10-02 PROCEDURE — 99214 OFFICE O/P EST MOD 30 MIN: CPT | Performed by: PHYSICIAN ASSISTANT

## 2024-10-02 PROCEDURE — 36415 COLL VENOUS BLD VENIPUNCTURE: CPT | Performed by: PHYSICIAN ASSISTANT

## 2024-10-02 PROCEDURE — 80048 BASIC METABOLIC PNL TOTAL CA: CPT | Performed by: PHYSICIAN ASSISTANT

## 2024-10-02 NOTE — TELEPHONE ENCOUNTER
[facility] Call Center    Phone Message    May a detailed message be left on voicemail: yes     Reason for Call: Other: Pt's daughter Vaishali calls in requesting a call back from provider or care team re: fathers cognitive decline. Vaishali states she is wondering if provider has any suggestions for the family. Also wondering if AVS from 8/12/24 can be printed & mailed to pt's home address on file in demographics.     Vaishali can be reached at [phone] to discuss cognitive decline.    Action Taken: Message routed to:  Other: [facility]    Travel Screening: Not Applicable

## 2024-10-02 NOTE — ASSESSMENT & PLAN NOTE
Today for evaluation of bilateral lower extremity edema particularly in his feet.  No chest pain or shortness of breath.  Weight currently is 143, previously 150 pounds in June.  No history of congestive heart failure.  No history of chronic kidney disease.  Exam today demonstrates that his socks were very tight around the ankle causing increased edema in his feet.  Recommend compression socks daily.  Recommend elevating his legs is much as possible periodically throughout the day.  He has a follow-up appointment on October 15 with his primary care physician.  If symptoms persist or worsen he may benefit from additional testing.  Check BMP today.

## 2024-10-02 NOTE — PROGRESS NOTES
The longitudinal plan of care for the diagnosis(es)/condition(s) as documented were addressed during this visit. Due to the added complexity in care, I will continue to support Patrick in the subsequent management and with ongoing continuity of care.    Assessment & Plan   Problem List Items Addressed This Visit       Bipolar I disorder, single manic episode (H)     Currently on Depakote and mirtazapine.  Symptoms stable.         Foot swelling - Primary     Today for evaluation of bilateral lower extremity edema particularly in his feet.  No chest pain or shortness of breath.  Weight currently is 143, previously 150 pounds in June.  No history of congestive heart failure.  No history of chronic kidney disease.  Exam today demonstrates that his socks were very tight around the ankle causing increased edema in his feet.  Recommend compression socks daily.  Recommend elevating his legs is much as possible periodically throughout the day.  He has a follow-up appointment on October 15 with his primary care physician.  If symptoms persist or worsen he may benefit from additional testing.  Check BMP today.         Moderate dementia without behavioral disturbance, psychotic disturbance, mood disturbance, or anxiety, unspecified dementia type (H)     Currently on rivastigmine.  Tolerating medication well.  He lives in assisted living.           The longitudinal plan of care for the diagnosis(es)/condition(s) as documented were addressed during this visit. Due to the added complexity in care, I will continue to support Patrick in the subsequent management and with ongoing continuity of care.    Subjective   Patrick is a 89 year old, presenting for the following health issues:  Edema (Bilateral for approximately 3 month and has not been walking much for the past 4 months.)        10/2/2024     1:47 PM   Additional Questions   Roomed by Charito [facility]AIN   Accompanied by Vaishali- daughter      Patrick- son in law     History of Present Illness        Reason for visit:  Swollen feet and miralax advice    He eats 0-1 servings of fruits and vegetables daily.He consumes 1 sweetened beverage(s) daily.He exercises with enough effort to increase his heart rate 9 or less minutes per day.  He exercises with enough effort to increase his heart rate 3 or less days per week.   He is taking medications regularly.           Objective    /77 (BP Location: Left arm, Patient Position: Sitting, Cuff Size: Adult Regular)   Pulse 70   Temp 97.9  F (36.6  C) (Oral)   Resp 12   Wt 65.3 kg (143 lb 14.4 oz)   SpO2 97%   BMI 21.25 kg/m    Body mass index is 21.25 kg/m .  Physical Exam  Vitals and nursing note reviewed.   Constitutional:       Appearance: Normal appearance.   Cardiovascular:      Rate and Rhythm: Normal rate.   Pulmonary:      Effort: Pulmonary effort is normal.      Breath sounds: Normal breath sounds.   Neurological:      Mental Status: He is alert.                Signed Electronically by: Collette Ceja PA-C

## 2024-10-03 LAB
ANION GAP SERPL CALCULATED.3IONS-SCNC: 11 MMOL/L (ref 7–15)
BUN SERPL-MCNC: 14.2 MG/DL (ref 8–23)
CALCIUM SERPL-MCNC: 9 MG/DL (ref 8.8–10.4)
CHLORIDE SERPL-SCNC: 99 MMOL/L (ref 98–107)
CREAT SERPL-MCNC: 0.76 MG/DL (ref 0.67–1.17)
EGFRCR SERPLBLD CKD-EPI 2021: 86 ML/MIN/1.73M2
GLUCOSE SERPL-MCNC: 90 MG/DL (ref 70–99)
HCO3 SERPL-SCNC: 25 MMOL/L (ref 22–29)
POTASSIUM SERPL-SCNC: 4.2 MMOL/L (ref 3.4–5.3)
SODIUM SERPL-SCNC: 135 MMOL/L (ref 135–145)

## 2024-10-03 NOTE — TELEPHONE ENCOUNTER
RN returned call to Vaishali (ctc on file). She reports that Patrick is doing well overall, needs minimal assistance from snf. Asking about things she can do to help his cognition like games and puzzles.     RN recommended simple card games like min, crossword/word finding puzzles, jigsaw puzzles. Recommended only doing activities that he finds enjoyable and changing the activity if he is frustrated or becomes upset. She is agreeable.     David Ayala RN, BSN  [facility]

## 2024-10-15 ENCOUNTER — OFFICE VISIT (OUTPATIENT)
Dept: FAMILY MEDICINE | Facility: CLINIC | Age: 89
End: 2024-10-15
Payer: COMMERCIAL

## 2024-10-15 VITALS
HEART RATE: 77 BPM | SYSTOLIC BLOOD PRESSURE: 116 MMHG | OXYGEN SATURATION: 98 % | TEMPERATURE: 98 F | DIASTOLIC BLOOD PRESSURE: 72 MMHG | RESPIRATION RATE: 18 BRPM

## 2024-10-15 DIAGNOSIS — M79.89 RIGHT LEG SWELLING: ICD-10-CM

## 2024-10-15 DIAGNOSIS — F03.B0 MODERATE DEMENTIA WITHOUT BEHAVIORAL DISTURBANCE, PSYCHOTIC DISTURBANCE, MOOD DISTURBANCE, OR ANXIETY, UNSPECIFIED DEMENTIA TYPE (H): ICD-10-CM

## 2024-10-15 DIAGNOSIS — M79.89 LEFT LEG SWELLING: ICD-10-CM

## 2024-10-15 DIAGNOSIS — R53.81 PHYSICAL DECONDITIONING: ICD-10-CM

## 2024-10-15 DIAGNOSIS — R60.0 LOWER EXTREMITY EDEMA: Primary | ICD-10-CM

## 2024-10-15 PROCEDURE — 99213 OFFICE O/P EST LOW 20 MIN: CPT | Performed by: FAMILY MEDICINE

## 2024-10-15 RX ORDER — POLYETHYLENE GLYCOL 3350 17 G/17G
POWDER, FOR SOLUTION ORAL
COMMUNITY
Start: 2024-07-24

## 2024-10-15 NOTE — PROGRESS NOTES
Patrick Wharton  /72   Pulse 77   Temp 98  F (36.7  C)   Resp 18   SpO2 98%      Assessment/Plan:                Patrick was seen today for foot swelling.    Diagnoses and all orders for this visit:    Lower extremity edema    Moderate dementia without behavioral disturbance, psychotic disturbance, mood disturbance, or anxiety, unspecified dementia type (H)    Physical deconditioning    Left leg swelling    Right leg swelling         DISCUSSION  See discussion below.  Subjective:     HPI:    Patrick Wharton is a 89 year old male is here today with his daughter and son-in-law.  He has mild cognitive impairment likely progressing toward dementia process he resides in an assisted living center his wife  this past year.  His family is very supportive.  I have known him for a number of years.  Other medical history includes bipolar disorder, prostate cancer.    He has had gradually progressive cognitive decline.  His most recent neurology visit from August showed slight decline in his cognitive function based on objective measurement.    He remains on medications as listed.    Constipation has been an issue but he feels he is managing constipation appropriate now taking half dose of MiraLAX twice daily.    He has bilateral lower extremity edema but is likely dependent edema not caused by any serious etiology.  He is working to elevate his feet and this is helping.  Compression stockings have been recommended.  We discussed the benefits.  He demonstrated that he can put them on and take them off today on his own we discussed the benefits of the compression stockings and he states he is willing to wear them.    We talked about relative deconditioning.  His daughter noted he seemed to do well with a couple of physical therapy sessions he has been for no particular reason he decided to cancel them.  We discussed restarting them and he is ultimately agreeable.    He would benefit from both physical therapy and  Occupational Therapy services due to his deconditioning to improve his overall strength, range of motion and overall mobility.  This would also be instrumental in improving his overall functioning within his own apartment to maintain what ever degree of independence he can maintain.  He needs these skilled services to attempt to regain functioning prior to physical deconditioning which is leading to decreased mobility and decline in function overall.  He is homebound due to his dementia status and reduced mobility.  He is reliant on his family to leave the home.  I will continue to follow him for home care services.    ROS:  Complete review of systems is obtained.  Other than the specific considerations noted above complete review of systems is negative.          Objective:   Medications:  Current Outpatient Medications   Medication Sig Dispense Refill    divalproex sodium delayed-release (DEPAKOTE) 250 MG DR tablet Take 1 tablet (250 mg) by mouth daily 90 tablet 3    mirtazapine (REMERON) 30 MG tablet Take 1 tablet (30 mg) by mouth At Bedtime 90 tablet 3    multivitamin w/minerals (THERA-VIT-M) tablet Take 1 tablet by mouth      polyethylene glycol (MIRALAX) 17 g packet 1 powder in packet 2 TIMES DAILY (route: oral)      Polyethylene Glycol 3350 (MIRALAX PO) Take 1 Dose by mouth 2 times daily      Psyllium (METAMUCIL FIBER) 51.7 % PACK Take 1 packet by mouth      rivastigmine (EXELON) 3 MG capsule Take 1 capsule (3 mg) by mouth 2 times daily 180 capsule 3    UNABLE TO FIND MEDICATION NAME: Ensure shakes daily       No current facility-administered medications for this visit.        Allergies:   No Known Allergies     Social History     Socioeconomic History    Marital status:      Spouse name: Not on file    Number of children: Not on file    Years of education: Not on file    Highest education level: Not on file   Occupational History    Not on file   Tobacco Use    Smoking status: Never     Passive  exposure: Past    Smokeless tobacco: Never   Vaping Use    Vaping status: Never Used   Substance and Sexual Activity    Alcohol use: No    Drug use: No    Sexual activity: Not on file   Other Topics Concern    Parent/sibling w/ CABG, MI or angioplasty before 65F 55M? No   Social History Narrative    Not on file     Social Determinants of Health     Financial Resource Strain: Unknown (2/5/2024)    Financial Resource Strain     Within the past 12 months, have you or your family members you live with been unable to get utilities (heat, electricity) when it was really needed?: Patient declined   Food Insecurity: Unknown (2/5/2024)    Food Insecurity     Within the past 12 months, did you worry that your food would run out before you got money to buy more?: Patient declined     Within the past 12 months, did the food you bought just not last and you didn t have money to get more?: Patient declined   Transportation Needs: Unknown (2/5/2024)    Transportation Needs     Within the past 12 months, has lack of transportation kept you from medical appointments, getting your medicines, non-medical meetings or appointments, work, or from getting things that you need?: Patient declined   Physical Activity: Not on file   Stress: Not on file   Social Connections: Not on file   Interpersonal Safety: Low Risk  (3/5/2024)    Interpersonal Safety     Do you feel physically and emotionally safe where you currently live?: Yes     Within the past 12 months, have you been hit, slapped, kicked or otherwise physically hurt by someone?: No     Within the past 12 months, have you been humiliated or emotionally abused in other ways by your partner or ex-partner?: No   Housing Stability: Unknown (2/5/2024)    Housing Stability     Do you have housing? : Patient declined     Are you worried about losing your housing?: Patient declined       Family History   Problem Relation Age of Onset    Heart Disease Mother     Alzheimer Disease Mother      Heart Failure Mother     Cancer Father         Most Recent Immunizations   Administered Date(s) Administered    COVID-19 12+ (Pfizer) 12/11/2023    COVID-19 Monovalent 18+ (Moderna) 01/14/2023    DT (PEDS <7y) 08/18/2005    Flu 65+ (Fluad) 10/02/2024    Flu, Unspecified 08/25/2020    Influenza (H1N1) 12/23/2009    Influenza (High Dose) Trivalent,PF (Fluzone) 09/17/2018    Influenza (IIV3) PF 10/14/2013    Influenza Vaccine 65+ (FLUAD) 10/03/2022    Influenza Vaccine 65+ (Fluzone HD) 09/29/2023    Influenza Vaccine >6 months,quad, PF 11/17/2014, 11/17/2014    Influenza Vaccine, 6+MO IM (QUADRIVALENT W/PRESERVATIVES) 10/14/2013    Influenza, Split Virus, Trivalent, Pf (Fluzone\Fluarix) 10/07/2010    Influenza, seasonal, injectable, PF 10/07/2010    Pneumo Conj 13-V (2010&after) 11/22/2016    Pneumococcal 23 valent 11/17/2014    TDAP (Adacel,Boostrix) 11/24/2017    Td (Adult), Adsorbed 08/18/2005    Td,adult,historic,unspecified 08/18/2005    Zoster recombinant adjuvanted (SHINGRIX) 01/03/2020        Wt Readings from Last 3 Encounters:   10/02/24 65.3 kg (143 lb 14.4 oz)   06/26/24 68.1 kg (150 lb 1.6 oz)   04/03/24 66.6 kg (146 lb 12.8 oz)        BP Readings from Last 6 Encounters:   10/15/24 116/72   10/02/24 103/77   08/12/24 90/50   07/16/24 134/66   06/26/24 98/64   04/03/24 106/71        Hemoglobin A1C   Date Value Ref Range Status   12/11/2023 5.7 (H) 0.0 - 5.6 % Final     Comment:     Normal <5.7%   Prediabetes 5.7-6.4%    Diabetes 6.5% or higher     Note: Adopted from ADA consensus guidelines.   12/09/2022 5.8 (H) 0.0 - 5.6 % Final     Comment:     Normal <5.7%   Prediabetes 5.7-6.4%    Diabetes 6.5% or higher     Note: Adopted from ADA consensus guidelines.   10/05/2021 5.3 0.0 - 5.6 % Final     Comment:     Normal <5.7%   Prediabetes 5.7-6.4%    Diabetes 6.5% or higher     Note: Adopted from ADA consensus guidelines.              PHYSICAL EXAM:    /72   Pulse 77   Temp 98  F (36.7  C)   Resp 18    SpO2 98%      General: Patient alert no signs of distress    Examination of the feet reveal edema slightly worse on the left as compared to the right.  Feet otherwise warm to the touch.                          Answers submitted by the patient for this visit:  General Questionnaire (Submitted on 10/2/2024)  Chief Complaint: Chronic problems general questions HPI Form  What is the reason for your visit today? : swollen feet and miralax advice  How many servings of fruits and vegetables do you eat daily?: 0-1  On average, how many sweetened beverages do you drink each day (Examples: soda, juice, sweet tea, etc.  Do NOT count diet or artificially sweetened beverages)?: 1  How many minutes a day do you exercise enough to make your heart beat faster?: 9 or less  How many days a week do you exercise enough to make your heart beat faster?: 3 or less  How many days per week do you miss taking your medication?: 0

## 2024-10-17 ENCOUNTER — PATIENT OUTREACH (OUTPATIENT)
Dept: CARE COORDINATION | Facility: CLINIC | Age: 89
End: 2024-10-17
Payer: COMMERCIAL

## 2024-10-17 NOTE — PROGRESS NOTES
Clinic Care Coordination Contact  Community Health Worker Initial Outreach    CHW Initial Information Gathering:  Referral Source: PCP  Preferred Hospital: [facility]  [phone]  Current living arrangement:: I live alone  Type of residence:: Apartment  Community Resources: None  Supplies Currently Used at Home: None  Equipment Currently Used at Home: wheelchair, manual  Informal Support system:: Family  No PCP office visit in Past Year: No  Transportation means:: Family  CHW Additional Questions  MyChart active?: No    Patient accepts CC: Yes. Patient scheduled for assessment with CCC XAVIER Wright on 10/24/24 at 2PM. Patient noted desire to discuss home care and pT resources.     CHW Note:  CHW contacted patient's daughter Vaishali regarding a referral that was placed for CCC. CHW introduced self and role of CCC.  Vaishali shared she would like to explore home care and home PT resources for the patient at this time.   CHW scheduled Vaishali with CCC XAVIER Wright on 10/24/24 at 2PM for assistance exploring home care and home PT resources. Vaishali requested CCC XAVIER Wright call her home phone for CCC assessment on 10/24/24.     Order Questions    Question Answer   Reason for Referral: Patient/Caregiver Support    Other   Patient/Caregiver Suport: Home Safety   My Clinical Question Is: Needs assistance with home care set up   Clinical Staff have discussed the Care Coordination Referral with the patient and/or caregiver: Yes   Additional Information: Please reach out to Daughter Vaishali       Sunita Lovelace  Community Health Worker   [facility] Care Coordination  [facility]   [email or web address]  Office: [phone]

## 2024-10-21 ENCOUNTER — TELEPHONE (OUTPATIENT)
Dept: FAMILY MEDICINE | Facility: CLINIC | Age: 89
End: 2024-10-21
Payer: COMMERCIAL

## 2024-10-21 ENCOUNTER — MEDICAL CORRESPONDENCE (OUTPATIENT)
Dept: HEALTH INFORMATION MANAGEMENT | Facility: CLINIC | Age: 89
End: 2024-10-21

## 2024-10-21 NOTE — TELEPHONE ENCOUNTER
Called and left detailed message on secured line with approval of verbal orders.     Jesse Rodriguez RN  [facility]

## 2024-10-21 NOTE — TELEPHONE ENCOUNTER
Home Care is calling regarding an established patient with [facility].       Requesting orders from: Selvin Riley  Provider is following patient: Yes  Is this a 60-day recertification request?  No    Orders Requested    Physical Therapy  Request for initial evaluation and treatment (one time)     Occupational Therapy  Request for initial certification (first set of orders)   Frequency:  Skip next week of 10/28/24 , 1x three weeks.     Verbal orders given for PT.  Information was gathered for OT.  Provider review needed.  RN will contact Home Care with information after provider review.  Confirmed ok to leave a detailed message with call back.  Contact information confirmed and updated as needed.    Jesse Rodriguez RN

## 2024-10-22 ENCOUNTER — OFFICE VISIT (OUTPATIENT)
Dept: INTERNAL MEDICINE | Facility: CLINIC | Age: 89
End: 2024-10-22
Payer: COMMERCIAL

## 2024-10-22 VITALS
TEMPERATURE: 97.2 F | OXYGEN SATURATION: 95 % | HEART RATE: 69 BPM | SYSTOLIC BLOOD PRESSURE: 94 MMHG | DIASTOLIC BLOOD PRESSURE: 72 MMHG | RESPIRATION RATE: 16 BRPM

## 2024-10-22 DIAGNOSIS — H61.23 BILATERAL IMPACTED CERUMEN: ICD-10-CM

## 2024-10-22 DIAGNOSIS — K59.01 SLOW TRANSIT CONSTIPATION: ICD-10-CM

## 2024-10-22 DIAGNOSIS — R42 DIZZINESS: Primary | ICD-10-CM

## 2024-10-22 PROCEDURE — G2211 COMPLEX E/M VISIT ADD ON: HCPCS | Performed by: INTERNAL MEDICINE

## 2024-10-22 PROCEDURE — 99214 OFFICE O/P EST MOD 30 MIN: CPT | Performed by: INTERNAL MEDICINE

## 2024-10-22 NOTE — PATIENT INSTRUCTIONS
Ear wax wash today.    Alternate Metamucil and Miralax to manage the constipation.    Follow-up with ENT and Neurology for chronic dizziness.

## 2024-10-22 NOTE — PROGRESS NOTES
Assessment & Plan     Dizziness  Patient is here today with his daughter and son in law.   He has chronic dizziness, for which he is taking meclizine almost every day. He does not have vertigo. Her has mostly short episodes of light dizziness, and mostly when stands up. Yesterday, after looking into the computer for some time, felt dizzy while sitting and was dizzy for few hours. He has dementia and cannot recall this event, but his daughter was present.  He does keep very detailed chart with events during the day.  Per that chart, he feels dizzy every day.  He does wear compression stockings but does not drink enough water or juice. They will start working in that.   He has mild cerumen impaction and both ears were washed today. He has hearing loss.  His blood pressure is low and I suspect dehydration and orthostatic dizziness.  He will follow-up with ENT and Neurology for chronic dizziness.    Slow transit constipation  Ongoing, taking Miralax daily. Reports gassiness. He will add Metamucil and can alternate with Moralax.    Bilateral impacted cerumen  He has mild cerumen impaction and both ears were washed today.     The longitudinal plan of care for the diagnosis(es)/condition(s) as documented were addressed during this visit. Due to the added complexity in care, I will continue to support Patrick in the subsequent management and with ongoing continuity of care.            Subjective   Patrick is a 89 year old, presenting for the following health issues:  Dizziness (Dizzy Spell x 1.5 Years)      10/22/2024     3:54 PM   Additional Questions   Roomed by Kathrine     History of Present Illness       Reason for visit:  Dizzy spells    He eats 0-1 servings of fruits and vegetables daily.He consumes 1 sweetened beverage(s) daily.He exercises with enough effort to increase his heart rate 9 or less minutes per day.  He exercises with enough effort to increase his heart rate 3 or less days per week.   He is taking medications  regularly.                     Objective    BP 94/72   Pulse 69   Temp 97.2  F (36.2  C)   Resp 16   SpO2 95%   There is no height or weight on file to calculate BMI.  Physical Exam               Signed Electronically by: Yahir Arciniega MD

## 2024-10-24 ENCOUNTER — PATIENT OUTREACH (OUTPATIENT)
Dept: NURSING | Facility: CLINIC | Age: 89
End: 2024-10-24
Payer: COMMERCIAL

## 2024-10-24 NOTE — PROGRESS NOTES
Clinic Care Coordination Contact    Situation: Patient chart reviewed by care coordinator.    Background: Patient was referred to Care Coordination regarding home care, including in-home PT resources.     Assessment: Writer spoke with patient's daughter Vaishali and patient today. Patient reported he is currently getting home care PT. Vaishali confirmed home care was currently coming out to patient's assisted living apartment. Vaishali denied any other needs or concerns.     Plan/Recommendations: Patient was not enrolled in Care Coordination as he currently did not have any needs or concerns. Patient is also living in an assisted living where staff there assist him with his care needs. Vaishali did accept writer's direct phone number if needs or concerns should arise.     David Myhre, RN   [facility] RN  [phone]

## 2024-11-01 ENCOUNTER — MYC MEDICAL ADVICE (OUTPATIENT)
Dept: FAMILY MEDICINE | Facility: CLINIC | Age: 89
End: 2024-11-01
Payer: COMMERCIAL

## 2024-11-05 ENCOUNTER — TELEPHONE (OUTPATIENT)
Dept: FAMILY MEDICINE | Facility: CLINIC | Age: 89
End: 2024-11-05
Payer: COMMERCIAL

## 2024-11-05 ENCOUNTER — NURSE TRIAGE (OUTPATIENT)
Dept: FAMILY MEDICINE | Facility: CLINIC | Age: 89
End: 2024-11-05
Payer: COMMERCIAL

## 2024-11-11 ENCOUNTER — TELEPHONE (OUTPATIENT)
Dept: FAMILY MEDICINE | Facility: CLINIC | Age: 89
End: 2024-11-11
Payer: COMMERCIAL

## 2024-11-11 NOTE — TELEPHONE ENCOUNTER
Patient's daughter, Vaishali, CTC on file, called into the clinic to discuss concerns she has about the patient's use of bowel medication for constipation, on 11/11/24.    Vaishali stated that for the past around 1-1.5 weeks, the patient has been hyper focused on having a bowel movement once daily.    Patient is taking Ex-Lax twice daily and drinking prune juice once daily, as well as taking Miralax and fiber, to have a bowel movement.    Vaishali stated that the patient has been in a wheelchair for the last 5 months and is worried about constipation and having to be seen in the hospital for constipation, so patient is taking increased amount of bowel medications to make sure he is having a BM once daily.    Vaishali is concerned about the amount of bowel medications the patient is taking, as well as anxiety symptoms the patient has been having lately.    Vaishali stated that the patient is also having increased anxiety symptoms, so Vaishali was wondering if the patient should be taking anti-anxiety medications.    Vaishali also stated that the patient has been exhibiting some confusion lately and is taking his medications on his own    Vaishali was not with the patient during the phone call with the writer, so writer was unable to triage the patient for constipation or anxiety symptoms.    Writer stated that is would be appropriate to triage the patient's constipation and anxiety symptoms to gather more information for the PCP to review.    Vaishali stated that she will be with the patient later this afternoon, so she will call into the clinic with the patient so the patient can be triaged by a nurse.    No other questions or concerns at this time.    Kathy Duckworth RN, BSN  [facility]

## 2024-11-11 NOTE — TELEPHONE ENCOUNTER
"S-(situation):   Patient and daughter returning call to clinic     B-(background):   Patient states \"just had a bowel movement so I am okay now\"   Patient tracks all bowel movements, assisted living does not track     A-(assessment):   Anxiety - related to bowel movements and if patient is having them or not   Patient has had a few bowel movements today   Patient has lost about 10 pounds in past few months - decreased intake due to concerns with constipation     R-(recommendations):   Patient's daughter is requesting PCP create a \"plan\" for patient's bowel movements for patient's assisted living [facility] to follow to ensure proper management of his bowel movements and that patient can also have to be able to hang up and read to possibly relieve some anxiety regarding bowel movements.    Please call patient and daughter to relay PCP recommendations.     MAIRA Soria, RN  [facility]        "

## 2024-11-13 ENCOUNTER — OFFICE VISIT (OUTPATIENT)
Dept: FAMILY MEDICINE | Facility: CLINIC | Age: 89
End: 2024-11-13
Payer: COMMERCIAL

## 2024-11-13 ENCOUNTER — TELEPHONE (OUTPATIENT)
Dept: FAMILY MEDICINE | Facility: CLINIC | Age: 89
End: 2024-11-13

## 2024-11-13 ENCOUNTER — MYC MEDICAL ADVICE (OUTPATIENT)
Dept: NEUROLOGY | Facility: CLINIC | Age: 89
End: 2024-11-13

## 2024-11-13 VITALS
OXYGEN SATURATION: 98 % | HEIGHT: 69 IN | SYSTOLIC BLOOD PRESSURE: 129 MMHG | RESPIRATION RATE: 16 BRPM | WEIGHT: 137.4 LBS | DIASTOLIC BLOOD PRESSURE: 86 MMHG | TEMPERATURE: 97.4 F | BODY MASS INDEX: 20.35 KG/M2 | HEART RATE: 72 BPM

## 2024-11-13 DIAGNOSIS — K59.01 SLOW TRANSIT CONSTIPATION: Primary | ICD-10-CM

## 2024-11-13 DIAGNOSIS — F03.B0 MODERATE DEMENTIA WITHOUT BEHAVIORAL DISTURBANCE, PSYCHOTIC DISTURBANCE, MOOD DISTURBANCE, OR ANXIETY, UNSPECIFIED DEMENTIA TYPE (H): ICD-10-CM

## 2024-11-13 DIAGNOSIS — H04.123 DRY EYES: Primary | ICD-10-CM

## 2024-11-13 DIAGNOSIS — H10.32 ACUTE BACTERIAL CONJUNCTIVITIS OF LEFT EYE: ICD-10-CM

## 2024-11-13 PROBLEM — J18.9 PNEUMONIA DUE TO INFECTIOUS ORGANISM, UNSPECIFIED LATERALITY, UNSPECIFIED PART OF LUNG: Status: RESOLVED | Noted: 2019-05-15 | Resolved: 2024-11-13

## 2024-11-13 PROCEDURE — 99214 OFFICE O/P EST MOD 30 MIN: CPT | Performed by: PHYSICIAN ASSISTANT

## 2024-11-13 RX ORDER — POLYMYXIN B SULFATE AND TRIMETHOPRIM 1; 10000 MG/ML; [USP'U]/ML
1-2 SOLUTION OPHTHALMIC EVERY 4 HOURS
Qty: 10 ML | Refills: 0 | Status: SHIPPED | OUTPATIENT
Start: 2024-11-13 | End: 2024-11-20

## 2024-11-13 ASSESSMENT — ENCOUNTER SYMPTOMS: EYE PAIN: 1

## 2024-11-13 NOTE — TELEPHONE ENCOUNTER
FYI - Status Update    Who is Calling: family member, Vaishali    Update: Daughter is requesting medicines to given by nurses at [facility].  Please fax orders for meds to be given to [phone]    Does caller want a call/response back: Yes     Could we send this information to you in [employer] or would you prefer to receive a phone call?:   Patient would prefer a phone call   Okay to leave a detailed message?: Yes at Cell number on file:    Telephone Information:   Mobile [phone] Vaishali

## 2024-11-13 NOTE — PROGRESS NOTES
Assessment & Plan     Slow transit constipation  Chronic issue, discussed that he got a message from his PCP today with recommendations for bowel regimen. Advised to follow with his PCP but discussed he does not need to have BM every day, okay to go up to 3 days. If no BM and no gas, belly pain should go to ER.    Moderate dementia without behavioral disturbance, psychotic disturbance, mood disturbance, or anxiety, unspecified dementia type (H)  Chronic issue, on medication and follows with Neurology. Encouraged him to reach out to Neurology for further follow up.    Acute bacterial conjunctivitis of left eye  Patient does have pink eye on exam, will treat with eye drops today.  - polymixin b-trimethoprim (POLYTRIM) 54905-2.1 UNIT/ML-% ophthalmic solution  Dispense: 10 mL; Refill: 0      Risks, benefits and alternatives were discussed with patient. Agreeable to the plan of care.      Aylin Nguyen is a 89 year old, presenting for the following health issues:  Eye Problem (Bilateral eye pain), Weight Loss, Constipation (Feeling like patient is constipation- last BM was last night/), and Confusion (Confusion has gotten worst in the last few weeks. )        11/13/2024     2:32 PM   Additional Questions   Roomed by Daniella BONILLA CMA   Accompanied by Daughter     History of Present Illness       Reason for visit:  Gas and bowel plan    He eats 0-1 servings of fruits and vegetables daily.He consumes 1 sweetened beverage(s) daily.He exercises with enough effort to increase his heart rate 9 or less minutes per day.  He exercises with enough effort to increase his heart rate 3 or less days per week.   He is taking medications regularly.           Patient here today with daughter for evaluation of a few concerns:  He is concerned about bowels, wondering about what he should/should not take for BM. He has been messaging with his PCP and did not read an earlier message with his recommendations. Has been using Miralax, fiber  "daily but has recently taken Exlax. Is gassy.  Family notes his confusion is worsening, follows with Neurology. On medication.  Lastly, he complains of eye pain and drainage, L>R. Started today.      Review of Systems  Constitutional, HEENT, cardiovascular, pulmonary, gi and gu systems are negative, except as otherwise noted.      Objective    /86 (BP Location: Left arm, Patient Position: Sitting, Cuff Size: Adult Regular)   Pulse 72   Temp 97.4  F (36.3  C) (Oral)   Resp 16   Ht 1.753 m (5' 9\")   Wt 62.3 kg (137 lb 6.4 oz)   SpO2 98%   BMI 20.29 kg/m    Body mass index is 20.29 kg/m .  Physical Exam   GENERAL: alert and no distress  EYES: left conjunctiva is erythematous, copious yellow/white discharge present, minimal erythema in right without discharge  NECK: no adenopathy, no asymmetry, masses, or scars  RESP: lungs clear to auscultation - no rales, rhonchi or wheezes  CV: regular rate and rhythm, normal S1 S2, no S3 or S4, no murmur, click or rub, no peripheral edema  ABDOMEN: soft, nontender, no hepatosplenomegaly, no masses and bowel sounds normal  MS: no gross musculoskeletal defects noted, no edema          Signed Electronically by: Amanda Reed PA-C    "

## 2024-11-14 RX ORDER — POLYETHYLENE GLYCOL, PROPYLENE GLYCOL .4; .3 G/100ML; G/100ML
LIQUID OPHTHALMIC
Status: SHIPPED
Start: 2024-11-14

## 2024-11-14 NOTE — TELEPHONE ENCOUNTER
Please print med list.  Place it on my desk I can sign it tomorrow and we can fax it as the order for his medications.  The medication list is updated.

## 2024-11-14 NOTE — TELEPHONE ENCOUNTER
S-(situation): Daughter calling in to notify PCP that patient was prescribed antibiotic drop for eye infection yesterday and requesting additional eye drop be added to patient's medication list.     B-(background): Patient seen yesterday, 11/13/24, at [facility] and prescribed polytrim eye drops for acute bacterial conjunctivitis of left eye.     A-(assessment): Per chart review, medication list already updated with current antibiotic eye drops.     R-(recommendations): Daughter requesting the following OTC medication be added to medication list for assisted living facility to administer:   -Renew lubricating eye drop for dry eyes preservative free- Place 1-2 drops into both eyes twice weekly and as needed.     Requesting signed medication list be sent assisted living facility (listed in previous note) so that staff can begin administering medications.     Routing to provider.     Leanne Aquino RN  [facility]

## 2024-11-15 ENCOUNTER — TELEPHONE (OUTPATIENT)
Dept: FAMILY MEDICINE | Facility: CLINIC | Age: 89
End: 2024-11-15
Payer: COMMERCIAL

## 2024-11-15 DIAGNOSIS — K59.01 SLOW TRANSIT CONSTIPATION: ICD-10-CM

## 2024-11-15 DIAGNOSIS — Z79.2 PROPHYLACTIC ANTIBIOTIC: Primary | ICD-10-CM

## 2024-11-15 RX ORDER — AMOXICILLIN 500 MG/1
CAPSULE ORAL
Qty: 4 CAPSULE | Refills: 1 | Status: SHIPPED | OUTPATIENT
Start: 2024-11-15

## 2024-11-15 NOTE — TELEPHONE ENCOUNTER
Take a new patient visit if the patient is added on.  Complex situation with cognitive decline with increased confusion/anxiety.  Patient is probably going to need increased care center which is something his primary could also help work on setting up.  From some of the questions in the note I am not sure that assisted living with increased care will be enough.  Use new patient visit for follow-up as this is complex issue.  chandler Rene MD on 11/15/2024 at 4:18 PM

## 2024-11-15 NOTE — TELEPHONE ENCOUNTER
Daughter is calling and stating she has not have heard back from the provider yet about the patient's constipation issues going on and asking if the provider can fax over a prescription to [facility] [id number]. Also asking for Amoxicillin for his dentist appointment and they should go to [facility] to be picked up [facility] - [place], [place] - [address]  . Please advise and call patient back with updates please and thank you.

## 2024-11-15 NOTE — TELEPHONE ENCOUNTER
Per previous message I did send the prophylactic antibiotic as she requested.  I sent a prescription for glycerin suppositories.  I am getting variable information about his bowel movements and the urgency of it.  Glycerin suppositories are available if needed.  Order sent over for bowel regimen to be administered by his care staff.

## 2024-11-15 NOTE — TELEPHONE ENCOUNTER
New Medication Request    Contacts       Contact Date/Time Type Contact Phone/Fax    11/15/2024 02:12 PM CST Phone (Incoming) Vaishali Torres (Emergency Contact)             What medication are you requesting?:Suppositories     Reason for medication request: Pt hasn't had a bowel movement in about 2 days and pt is very concerned about it- nurses are not avail on the wknds and pt would like this to be sent in asap.     Daughter also mentioned Amoxicillin as well-- ph call was a bit confusing as to what is needed and the info that was given via phone call.    Controlled Substance Agreement on file:   CSA -- Patient Level:    CSA: None found at the patient level.         Patient offered an appointment? No- Daughter mentioned that all prescriptions should be sent to this pharmacy listed below.   [facility]   Ph: [phone]       Preferred Pharmacy:   Could we send this information to you in [facility] or would you prefer to receive a phone call?:   Patient would prefer a phone call   Okay to leave a detailed message?: Yes at Cell number on file:    Telephone Information:   Mobile [phone]

## 2024-11-15 NOTE — TELEPHONE ENCOUNTER
Writer called the patient's daughter, COLTON Tom on file, to gather more information.    The patient sent in a [employer] message, to the PCP, on 11/13/24, regarding having a large bowel movement on 11/13/24, so the patient has had at least 1 bowel movement in the last 2 days, per patient's own admission.    PCP also responded to the patient, via [employer], regarding recommendations for bowel medications, on 11/13/24, after viewing the patient's [employer] message.    Writer called Vaishali's mobile number, but the voicemail box was not set up.    Writer also called Vaishali's home number and left a message to return call.    When Vaishali calls back, please route her to the RN queue to discuss the PCP's recent recommendations, as well as other concerns regarding the patient-see [employer] messages on 11/13/24.    Writer will also route the above information to the PCP regarding Vaishali's request for an antibiotic for the patient for an upcoming dental appointment.    Kathy Duckworth RN, BSN  [facility]

## 2024-11-15 NOTE — TELEPHONE ENCOUNTER
"Reason for call:  Other     Patient called regarding (reason for call): Order    Additional comments: Vaishali requesting an order for a suppository to help with constipation. Reports it has been around three days since pt's last BM. States pt is \"extremely worried\" that he will need an enema. Vaishali is looking for advice and reassurance as well as an order for suppositories. Please advise.    Phone number to reach patient:  Other phone number:  [phone]    Best Time:  Any    Can we leave a detailed message on this number?  YES  "

## 2024-11-16 ENCOUNTER — NURSE TRIAGE (OUTPATIENT)
Dept: NURSING | Facility: CLINIC | Age: 89
End: 2024-11-16
Payer: COMMERCIAL

## 2024-11-16 NOTE — TELEPHONE ENCOUNTER
Nurse Triage SBAR    Is this a 2nd Level Triage? NO    Situation: Constipation     Background: Daughter Vaishali calling stating that patient has not had a BM for a  least 3 days. Patient takes miralax twice per day and fiber once per day and usually has a BM every day. Wondering if they can give him a suppository.     Assessment: Daughter thinks it has been at least 3 days since last BM. Denies vomiting, abdominal swelling, or abdominal pain. Patient did state that he felt like he had to have a BM while we were on the phone. Phone call ended to let patient go to the bathroom.     Protocol Recommended Disposition:   Home Care - Information or Advice Only Call, Home Care    Recommendation: Daughter was instructed to call FNA back if patient is not able to have a BM today and we can triage his symptoms again and discuss possible need for suppository. Red flag symptoms were reviewed with daughter.     Triage not fully completed due to patient having to use the restroom during the middle of the call.    Reason for Disposition   MILD constipation   [1] Caller is not with the adult (patient) AND [2] probable NON-URGENT symptoms    Additional Information   Negative: [1] Vomiting AND [2] contains bile (green color)   Negative: Patient sounds very sick or weak to the triager   Negative: [1] Vomiting AND [2] abdomen looks much more swollen than usual   Negative: [1] Constant abdominal pain AND [2] present > 2 hours   Negative: Abdomen is more swollen than usual   Negative: Last bowel movement (BM) > 4 days ago    Protocols used: Constipation-A-, Information Only Call - No Triage-A-

## 2024-11-18 ENCOUNTER — TELEPHONE (OUTPATIENT)
Dept: FAMILY MEDICINE | Facility: CLINIC | Age: 89
End: 2024-11-18

## 2024-11-18 DIAGNOSIS — K59.01 SLOW TRANSIT CONSTIPATION: ICD-10-CM

## 2024-11-18 NOTE — TELEPHONE ENCOUNTER
Please clarify how the medication is being taken currently.  He is he taking it scheduled daily plus additional as needed?  If he is taking it as needed and how often is he taking it on average?

## 2024-11-18 NOTE — TELEPHONE ENCOUNTER
See message from the PCP to the patient/patient's daughter, Vaishali, CTC on file, from 11/15/24.    Writer called Vaishali and relayed the above message to Vaishali, who verbalized understanding and agrees with the plan.    Vaishali stated that the patient had bowel movements on Saturday, 11/16/24, and Sunday, 11/17/24.    Writer also relayed the MyChart message, from the PCP, to Vaishali/patient, from 11/13/24, regarding recommendations for the patient regarding medications and diet to assist with bowel movements.    Denies other questions or concerns at this time.    Kathy Duckworth, RN, BSN  [facility]

## 2024-11-18 NOTE — TELEPHONE ENCOUNTER
Reason for call:  Other     Patient called regarding (reason for call): PRN orders    Additional comments: Per Vaishali, pt takes mirtazapine as needed if he wakes in the middle of the night. She's requesting an order for him to have this available as a PRN as well as scheduled. Please fax to nursing at [phone].

## 2024-11-19 ENCOUNTER — OFFICE VISIT (OUTPATIENT)
Dept: FAMILY MEDICINE | Facility: CLINIC | Age: 89
End: 2024-11-19
Payer: COMMERCIAL

## 2024-11-19 VITALS
HEART RATE: 71 BPM | OXYGEN SATURATION: 98 % | DIASTOLIC BLOOD PRESSURE: 66 MMHG | BODY MASS INDEX: 20.45 KG/M2 | RESPIRATION RATE: 12 BRPM | WEIGHT: 138.5 LBS | SYSTOLIC BLOOD PRESSURE: 103 MMHG

## 2024-11-19 DIAGNOSIS — K59.01 SLOW TRANSIT CONSTIPATION: ICD-10-CM

## 2024-11-19 DIAGNOSIS — R63.6 MILDLY UNDERWEIGHT ADULT: Primary | ICD-10-CM

## 2024-11-19 DIAGNOSIS — F30.9 BIPOLAR I DISORDER, SINGLE MANIC EPISODE (H): ICD-10-CM

## 2024-11-19 DIAGNOSIS — F03.B0 MODERATE DEMENTIA WITHOUT BEHAVIORAL DISTURBANCE, PSYCHOTIC DISTURBANCE, MOOD DISTURBANCE, OR ANXIETY, UNSPECIFIED DEMENTIA TYPE (H): ICD-10-CM

## 2024-11-19 DIAGNOSIS — G31.84 MCI (MILD COGNITIVE IMPAIRMENT): ICD-10-CM

## 2024-11-19 PROCEDURE — G2211 COMPLEX E/M VISIT ADD ON: HCPCS | Performed by: PHYSICIAN ASSISTANT

## 2024-11-19 PROCEDURE — 99213 OFFICE O/P EST LOW 20 MIN: CPT | Performed by: PHYSICIAN ASSISTANT

## 2024-11-19 ASSESSMENT — PAIN SCALES - GENERAL: PAINLEVEL_OUTOF10: NO PAIN (0)

## 2024-11-19 NOTE — TELEPHONE ENCOUNTER
I spoke with Vaishali.    He is taking every night consistently at 915 pm.    He has had a 2nd dose a few time in the last week.    They are requesting a new order faxed to .    Mirtazapine every night at 915 pm.  Okay to take a second dose if necessary / PRN.    Thank you

## 2024-11-19 NOTE — ASSESSMENT & PLAN NOTE
He follows with neurology and is on rivastigmine.  He continues to live in assisted living.  His medications are locked and are given to him by staff.

## 2024-11-19 NOTE — PROGRESS NOTES
The longitudinal plan of care for the diagnosis(es)/condition(s) as documented were addressed during this visit. Due to the added complexity in care, I will continue to support Patrick in the subsequent management and with ongoing continuity of care.    Assessment & Plan   Problem List Items Addressed This Visit       Moderate dementia without behavioral disturbance, psychotic disturbance, mood disturbance, or anxiety, unspecified dementia type (H)     He follows with neurology and is on rivastigmine.  He continues to live in assisted living.  His medications are locked and are given to him by staff.         Slow transit constipation     He has chronic constipation.  He is on MiraLAX daily.  This is the focus of our visit today.  He at times experiences constipation in the morning.  When this occurs he does not want to take his Ensure and mixed nuts.  Currently his schedule is to take Ensure and mixed nuts at 10 AM and then he eats the evening meal down in the dining room at [facility].  His weight is stable from his previous visit.  I have recommended that he eat a small meal or snack in the afternoon around 2 PM.  We discussed use of MiraLAX on a daily basis along with fiber daily.         Relevant Medications    psyllium (METAMUCIL/KONSYL) 58.6 % powder    Mildly underweight adult - Primary     BMI 20.  Previous weight was 143 pounds last December.  He is currently 138 pounds.  We discussed eating small frequent meals.             Subjective   Patrick is a 89 year old, presenting for the following health issues:  Bowel Problems and Weight Loss        11/19/2024     1:59 PM   Additional Questions   Roomed by Charito FOUNTAIN   Accompanied by Daughter- Vaishali    Son- Ryland     History of Present Illness       Reason for visit:  Gas and bowel plan    He eats 0-1 servings of fruits and vegetables daily.He consumes 1 sweetened beverage(s) daily.He exercises with enough effort to increase his heart rate 9 or less minutes per  day.  He exercises with enough effort to increase his heart rate 3 or less days per week.   He is taking medications regularly.       Objective    /66 (BP Location: Left arm, Patient Position: Sitting, Cuff Size: Adult Regular)   Pulse 71   Resp 12   Wt 62.8 kg (138 lb 8 oz)   SpO2 98%   BMI 20.45 kg/m    Body mass index is 20.45 kg/m .  Physical Exam  Vitals and nursing note reviewed.   Constitutional:       Appearance: Normal appearance.   Neurological:      Mental Status: He is alert.   Psychiatric:         Mood and Affect: Mood normal.         Behavior: Behavior normal.         Thought Content: Thought content normal.         Judgment: Judgment normal.                Signed Electronically by: Collette Ceja PA-C

## 2024-11-19 NOTE — ASSESSMENT & PLAN NOTE
BMI 20.  Previous weight was 143 pounds last December.  He is currently 138 pounds.  We discussed eating small frequent meals.

## 2024-11-19 NOTE — ASSESSMENT & PLAN NOTE
He has chronic constipation.  He is on MiraLAX daily.  This is the focus of our visit today.  He at times experiences constipation in the morning.  When this occurs he does not want to take his Ensure and mixed nuts.  Currently his schedule is to take Ensure and mixed nuts at 10 AM and then he eats the evening meal down in the dining room at [facility].  His weight is stable from his previous visit.  I have recommended that he eat a small meal or snack in the afternoon around 2 PM.  We discussed use of MiraLAX on a daily basis along with fiber daily.

## 2024-11-19 NOTE — PATIENT INSTRUCTIONS
Take ensure and mixed nuts at 10 am.  Eat small snack in afternoon around 2 pm.  Eat dinner in the dining room    Use miralax daily.      Try to drink 6-8 glasses of fluid daily.

## 2024-11-20 DIAGNOSIS — F30.9 BIPOLAR I DISORDER, SINGLE MANIC EPISODE (H): ICD-10-CM

## 2024-11-20 RX ORDER — RIVASTIGMINE TARTRATE 3 MG/1
CAPSULE ORAL
Qty: 60 CAPSULE | Refills: 11 | Status: SHIPPED | OUTPATIENT
Start: 2024-11-20

## 2024-11-20 RX ORDER — DIVALPROEX SODIUM 250 MG/1
TABLET, DELAYED RELEASE ORAL
Qty: 30 TABLET | Refills: 11 | Status: SHIPPED | OUTPATIENT
Start: 2024-11-20

## 2024-11-20 RX ORDER — MIRTAZAPINE 30 MG/1
30 TABLET, FILM COATED ORAL
Qty: 30 TABLET | Refills: 11 | Status: SHIPPED | OUTPATIENT
Start: 2024-11-20

## 2024-11-20 NOTE — TELEPHONE ENCOUNTER
Please call daughter: I am a bit concerned about this dosing schedule is taking 2 tablets in a day is a relatively high dose.  It is not necessarily out of the question but I would like to know who recommended that an extra tablet be taken as needed.

## 2024-11-20 NOTE — TELEPHONE ENCOUNTER
Patient was last seen August 12, 2024 did not want to do follow-up for a year's request to be followed up in 1 year.  Multiple phone calls since last seen  Emergently added on to be seen November 26, 2024  They called to have his meds updated some were provided by his primary such as the mirtazapine we can fill that for now.  I did sign the prescriptions.  chandler Rene MD on 11/20/2024 at 11:20 AM

## 2024-11-20 NOTE — TELEPHONE ENCOUNTER
Called and spoke with Daughter Vaishali, She stated that there wasn't anyone that requested the second dose, she believes Patrick just started doing it himself.     Vaishali stated she thinks it would ok for him to take a smaller dose the second time. But she is unsure how often or how many times he has done it.     He only takes it if he wakes up in the night and can't get back to sleep.     Jesse Rodriguez RN  [facility]

## 2024-11-21 NOTE — TELEPHONE ENCOUNTER
FYI - Status Update    Who is Calling: family member, son, Patrick    Update: states he called [facility]ika, and he would like mallika to take care of this, as they originally prescribed it.     Does caller want a call/response back: No

## 2024-11-21 NOTE — TELEPHONE ENCOUNTER
I am more than happy to defer to the neurologist regarding the dosing of the medication.  If I can be of further help let me know

## 2024-11-21 NOTE — TELEPHONE ENCOUNTER
FYI - Status Update    Who is Calling: family member, Patrick roberts    Update: Patrick roberts is calling and requesting an urgent update for this medication request.   Patient is still not sleeping throughout the night and is waking up and being wheeled to the nurses station during the night.   Patrick is requesting some type of additional sleep aid for the patient at this time.     Provider: Ramon Rene MD ordered patient one year of medication (mirtazapine (REMERON) 30 MG tablet) of the originally prescribed dose on 24 to preferred pharmacy, however family is still requesting to have medication changed so the patient can receive an additional dose (OK if a smaller dose penaloza the 30 MG prescribed) of this medication.      Please advise and send an appropriate prescription to pharmacy for patient urgently, as patient is not sleeping. Patrick would like a call back, and to have the new orders for the medication also faxed to the patient's living facility ([phone])    Does caller want a call/response back: Yes     Could we send this information to you in [employer] or would you prefer to receive a phone call?:   Patient would prefer a phone call   Okay to leave a detailed message?: Yes at Other phone number: [phone]

## 2024-11-25 NOTE — PROGRESS NOTES
In person evaluation    HPI  January 1, 2020, in person consultation transfer of care from Dr. Vikas Mcginnis  4/27/2020, telephone visit  8/3/2020, telephone visit  11/6/2020, video visit  8/13/2021, in person visit  2/16/2022, in person visit  8/19/2022, in person visit  3/13/2023, in person visit  11/3/2023, in person visit  8/12/2024, in person visit  11/26/2024, in person visit      89-year-old followed neurologically for:  Cognitive decline (memory loss)  Onset as far back as 2015  Difficulty remembering people's names  Previously cared for by Dr. Vikas Mcginnis  History of bipolar disorder  Abnormal EEG        Since last seen August 2024  Emergency add-on due to increasing behavioral difficulties  [facility]  Patient with increasing confusion/anxiety, patient requiring increasing care needs.  Primary worked on getting patient into care setting.    Patient is revved up about a lot of issues  Has decreased hearing  Seems to fixate on a topic and gets anxious/agitated  Is sleeping better with the 45 mg of mirtazapine at night  I think we should also increase the Depakote though during the day  Does have a history of bipolar disorder    Lives at [facility]  They take care of his meds  Gets a shower 2 times per week  He goes down for supper  Has some Ensure and some nuts in the morning  Has chronic decreased range of motion of his shoulders from past shoulder trouble        Patient on some mirtazapine to help with mood and sleep  Relative rapid increase in medication after going to care center from 30 mg to 45  Patient also has difficulty with bipolar disorder    Patient should be on for his dementia  Mirtazapine 45 mg 1 p.o. nightly  Exelon 3 mg p.o. twice daily  Depakote  250 mg tablet 1 tab p.o. twice daily (increased 11/26/2024)    November 2024 visit review:  ENT visit 12/12/2023 dizziness episodic/stable hearing loss on right side/Ménière's disease left side  PMD  visit 2/5/2024 pressure ulcer left hip  NP visit 6/26/2024 (mobility difficulty/imbalance)  PMD visit 7/16/2024 physical deconditioning ordered PT/OT  No hospitalizations  No surgeries    [facility]  8/12/2024,    19 out of 30  Patient has been in the wheelchair more due to poor balance  Patient's wife passed away in May 2024 which is a major stressor    Lives in assisted living  Decreased mobility  Transferring from wheelchair to bed    Does have dizziness/vertigo  Has significant hearing loss left ear is very bad, right ear has a hearing aid and it kind of works  Patient does better when he can read lips     No hallucinations/no vivid dreams  Gets up once at nighttime to go to the bathroom  Uses walker/wheelchair  Sometimes if he moves too quick he will have a little bit of dizziness    No faintness  Has some difficulty with bradycardia  No diarrhea    Only eats 1 meal a day but that is his choice  No hallucinations no vivid dreams    Walks okay with the 4 wheeled walker with hand break  Family thought that maybe he fatigues with the 25-minute walk  Back when he had COVID a year before it kind of took a lot out of him but he rallied a bit      Mirtazapine 30 mg at nighttime to help with sleep prescribed by other physicians    Forgetfulness is about the same although 8 years have gone by, it has fallen off a bit.              A.  Mild cognitive impairment       Onset as far back as 2015       Trouble remembering people's names       Past abnormal EEG       Previously cared for by Dr. Vikas Mcginnis       Mother had some dementia the last year of life when she lived to be 90       Scored a 23 out of 30, (8/13/2021)       Scored a 25 out of 30, (January 2020)       Subtle change over a year and a half         We discussed cognitive decline       Exelon 3 mg 1 tab p.o. twice daily      Depakote 250 mg 1 p.o. nightly        No GERD no diarrhea no lightheadedness no black tarry stools no blood in the stool      Does have severe  hearing loss at baseline      B.  Has history of bipolar disorder        Neurologic review from original visit August 2021  Patient feels that he has had a falloff in his memory has more trouble tracking and following conversations  Used to go to a group with his retired friends from [employer] but they cannot get together due to Covid  Has severe hearing loss in both ears  Hearing doctor thought that they could maybe make a hearing aid that would work on the left ear but the not sure  With the mask on and talking to him he misses a lot of the conversation though  I am sure this is causing some difficulty with his memory recall and tracking conversations    Patient is still coherent can should chat about multiple topics  Can jump from topic to topic fairly easy  Does better when we talk about more things from the past  Scored a 23 out of 30, (8/13/2021)  Scored a 25 out of 30, (January 2020)  Subtle change over a year and a half        Neurologic summary:  Previously followed by Dr. Vikas Mcginnis  Mild cognitive impairment as far back as 2015  Patient works for [employer] as a   Retired in 1998  [facility] testing January 2020, 25 out of 30  Seems to have difficulty remembering people's names  Has significant hearing loss right greater than left left is not enough to be helped by hearing aid  Past history of bipolar disorder  Mother with dementia in the last year of her life at age 90  Uncle last year of life age 88 developed dementia  In the distant past of been on Depakote for 2 to 3 years for bipolar disorder  Has an abnormal EEG  Patient restarted on Depakote 2020  Talked about the pros and cons of using this to maybe stabilize the temporal lobe see if his symptoms are little less severe may be slow down the progression  His daughter has depression and is on Depakote    Patient tends to be quite active rows or exercises on the exercise bike regularly  Watches his diet hemoglobin A1c's have been good  Tries to stay active  volunteers and participates in activities      Past medical history  Mild cognitive impairment  Bipolar disorder  Prostate cancer  Benign prostatic hypertrophy  Seborrheic dermatitis  Osteoarthritis with shoulder surgery  Left leg edema    Habits   Non-smoker  Does not drink alcohol  Worked as a [employer]  retired in   Lives in seniors apartment with elevator  Does exercise regularly stays active      Family history  Mother had glaucoma congestive heart failure dementia  about age 90  Father  at 34 with testicular cancer  He has 1 sister with some diabetes  Daughter with depression treated with Depakote  Uncle with difficulty with memory at the age of 88 last year of his life      Work-up review  MRI scan brain 2016 mild to moderate atrophy progressed mildly from   Laboratory data 2019  Sodium 140 potassium 4.9 BUN 8 creatinine 0.8 glucose 94  White blood count 6.3 hemoglobin 13.2 platelets 313,000  Hemoglobin A1c 5.8%  Outside records from Dr. Mcginnis 2018, 2018 reviewed  EEG 2020 rare F7/T3 sharp waves with RAINE activity with hyperventilation mild to moderately abnormal  B12 689 ,TSH 1.57, (2020)    [facility]  2020,    25 out of 30   2021,   23 out of 30   2022,   22 out of 30  3/13/2023,    24 out of 30  2024,    19 out of 30    Laboratory data review                      2022          2023        3/2024          10/2024  NA/K             138/4.6          136/4.2       137/4.8         135/4.2  BUN/Cr          3.7/0.73        6/0.82          21.4/0.74     14.2/0.76  GLU               91                  87              100               90  AST               30                                    28  WBC/HGB     5.7/13.2         5.3/13.3     11.3/13.3  PLTs              226,000         227,000      25,000  HGBA1C        5.8                5.9  B12                                                        718  TSH                                                        1.52    Exam    Review of system   Pertinent positives and negatives  Chronic hearing loss left much worse than right has a hearing aid on the right  Reads lips    No hallucinations  No vivid dreams    Dizziness periodically  No diplopia  No dysarthria  No dysphagia  No diarrhea  No tremor  No chest pain no shortness of breath  No headache      Ataxia uses the walker and cane  Discussed gait safety    Otherwise review systems negative    General exam  Blood pressure 97/55, pulse 74  Alert oriented x3  HEENT significant for hearing loss in left ear and right ear poor hearing and has a hearing aid  Lungs clear  Abdomen soft  No edema the feet  Patient fan only eats 1 meal per day goes down to the cafeteria with his wheelchair being pushed    Neurologic exam  Alert orient x3  Normal prosody speech  Normal naming  Normal comprehension  Normal repetition  No aphasia  No neglect  [facility]  3/13/2023,    24 out of 30  8/12/2024,    19 out of 30    Cranial 2 through 12 are significant for  Deaf in left ear significant hearing loss of the other  No ophthalmoplegia  No nystagmus  Face symmetrical  Tongue twisters okay  Visual fields intact    Upper extremities  No drift no tremor normal finger-nose  Has chronic decreased range of motion of the shoulders right worse than left previous shoulder surgery    Lower extremities  Distal proximal strength good    Gait  Slow to get up pushing off with both hands on the wheelchair  Stands next to the wheelchair with some standby assistance slowly tries to march in place but has a little bit of retropulsion  Has flexed posture  High risk for falls mainly spends most of his time in the wheelchair but does transfer              Assessment/Plan     1.  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       Mild cognitive impairment      Onset as far back as 2015       Family history of dementia in the later years mom and uncle        History of bipolar disorder       Abnormal EEG F7/T3 sharp wave    8/12/2024,    19 out of 30           Depakote 250 mg 1 tablet twice daily (increased 11/26/2024)       Exelon 3 mg capsule twice daily (added 8/13/2021)    Tolerating medication    Discussed neurodegenerative disease and progression  Discussed as time goes on there is less neurologic reserve  With significant hearing loss this can affect his cognition also  Discussed that more meds may not necessarily make things better.  Discussed gait safety   Patient's wife passed away May 2024, stressor that can exacerbate symptoms        Patient should be on for his dementia  Mirtazapine 45 mg 1 p.o. nightly  Exelon 3 mg p.o. twice daily  Depakote  250 mg tablet 1 tab p.o. twice daily (increased 11/26/2024)    Patient seems to be more agitated wonder if this is more from his bipolar disorder component  He also has marked decreased hearing and reads lips  He seems to ruminate on issues    If he is not doing better with the above changes could consider increasing Exelon to 4.5 mg twice daily  I only want to make 1 change at a time  We had a conference today with patient and daughter about the above  He will follow-up in 6 months    I think his psychiatric disease along with his age and previous cognitive difficulties have caught up with him at this time.  He used to be somewhat better when his wife was still living as I think she helped stabilize him cognitively    Multiple issues as above discussed  Emergency add-on due to behavioral difficulties with adjustment of medication    40 minutes total care time today  The longitudinal plan of care for the diagnosis(es)/condition(s) as documented were addressed during this visit. Due to the added complexity in care, I will continue to support Patrick in the subsequent management and with ongoing continuity of care.      Follow-up in 6 months        As part of visit today  Reviewed multiple interactions through  MyChart

## 2024-11-26 ENCOUNTER — TELEPHONE (OUTPATIENT)
Dept: FAMILY MEDICINE | Facility: CLINIC | Age: 89
End: 2024-11-26

## 2024-11-26 ENCOUNTER — OFFICE VISIT (OUTPATIENT)
Dept: NEUROLOGY | Facility: CLINIC | Age: 89
End: 2024-11-26
Payer: COMMERCIAL

## 2024-11-26 ENCOUNTER — MYC MEDICAL ADVICE (OUTPATIENT)
Dept: NEUROLOGY | Facility: CLINIC | Age: 89
End: 2024-11-26

## 2024-11-26 VITALS
HEIGHT: 69 IN | WEIGHT: 138 LBS | BODY MASS INDEX: 20.44 KG/M2 | HEART RATE: 74 BPM | DIASTOLIC BLOOD PRESSURE: 55 MMHG | SYSTOLIC BLOOD PRESSURE: 97 MMHG

## 2024-11-26 DIAGNOSIS — F02.80 ALZHEIMER'S DISEASE (H): Primary | ICD-10-CM

## 2024-11-26 DIAGNOSIS — G31.84 MCI (MILD COGNITIVE IMPAIRMENT): ICD-10-CM

## 2024-11-26 DIAGNOSIS — G30.9 ALZHEIMER'S DISEASE (H): Primary | ICD-10-CM

## 2024-11-26 DIAGNOSIS — F30.9 BIPOLAR I DISORDER, SINGLE MANIC EPISODE (H): ICD-10-CM

## 2024-11-26 PROCEDURE — 99215 OFFICE O/P EST HI 40 MIN: CPT | Performed by: PSYCHIATRY & NEUROLOGY

## 2024-11-26 PROCEDURE — G2211 COMPLEX E/M VISIT ADD ON: HCPCS | Performed by: PSYCHIATRY & NEUROLOGY

## 2024-11-26 RX ORDER — DIVALPROEX SODIUM 250 MG/1
250 TABLET, DELAYED RELEASE ORAL 2 TIMES DAILY
Qty: 60 TABLET | Refills: 11 | Status: SHIPPED | OUTPATIENT
Start: 2024-11-26

## 2024-11-26 NOTE — LETTER
11/26/2024      Patrick Wharton  [address]  [place] [address]      Dear Colleague,    Thank you for referring your patient, Patrick Wharton, to the [facility] [place]. Please see a copy of my visit note below.    In person evaluation    HPI  January 1, 2020, in person consultation transfer of care from Dr. Vikas Mcginnis  4/27/2020, telephone visit  8/3/2020, telephone visit  11/6/2020, video visit  8/13/2021, in person visit  2/16/2022, in person visit  8/19/2022, in person visit  3/13/2023, in person visit  11/3/2023, in person visit  8/12/2024, in person visit  11/26/2024, in person visit      89-year-old followed neurologically for:  Cognitive decline (memory loss)  Onset as far back as 2015  Difficulty remembering people's names  Previously cared for by Dr. Vikas Mcginnis  History of bipolar disorder  Abnormal EEG        Since last seen August 2024  Emergency add-on due to increasing behavioral difficulties  [facility]  Patient with increasing confusion/anxiety, patient requiring increasing care needs.  Primary worked on getting patient into care setting.    Patient is revved up about a lot of issues  Has decreased hearing  Seems to fixate on a topic and gets anxious/agitated  Is sleeping better with the 45 mg of mirtazapine at night  I think we should also increase the Depakote though during the day  Does have a history of bipolar disorder    Lives at [facility]  Assisted living  They take care of his meds  Gets a shower 2 times per week  He goes down for supper  Has some Ensure and some nuts in the morning  Has chronic decreased range of motion of his shoulders from past shoulder trouble        Patient on some mirtazapine to help with mood and sleep  Relative rapid increase in medication after going to care center from 30 mg to 45  Patient also has difficulty with bipolar disorder    Patient should be on for his dementia  Mirtazapine 45  mg 1 p.o. nightly  Exelon 3 mg p.o. twice daily  Depakote  250 mg tablet 1 tab p.o. twice daily (increased 11/26/2024)    November 2024 visit review:  ENT visit 12/12/2023 dizziness episodic/stable hearing loss on right side/Ménière's disease left side  PMD visit 2/5/2024 pressure ulcer left hip  NP visit 6/26/2024 (mobility difficulty/imbalance)  PMD visit 7/16/2024 physical deconditioning ordered PT/OT  No hospitalizations  No surgeries    [facility]  8/12/2024,    19 out of 30  Patient has been in the wheelchair more due to poor balance  Patient's wife passed away in May 2024 which is a major stressor    Lives in assisted living  Decreased mobility  Transferring from wheelchair to bed    Does have dizziness/vertigo  Has significant hearing loss left ear is very bad, right ear has a hearing aid and it kind of works  Patient does better when he can read lips     No hallucinations/no vivid dreams  Gets up once at nighttime to go to the bathroom  Uses walker/wheelchair  Sometimes if he moves too quick he will have a little bit of dizziness    No faintness  Has some difficulty with bradycardia  No diarrhea    Only eats 1 meal a day but that is his choice  No hallucinations no vivid dreams    Walks okay with the 4 wheeled walker with hand break  Family thought that maybe he fatigues with the 25-minute walk  Back when he had COVID a year before it kind of took a lot out of him but he rallied a bit      Mirtazapine 30 mg at nighttime to help with sleep prescribed by other physicians    Forgetfulness is about the same although 8 years have gone by, it has fallen off a bit.              A.  Mild cognitive impairment       Onset as far back as 2015       Trouble remembering people's names       Past abnormal EEG       Previously cared for by Dr. Vikas Mcginnis       Mother had some dementia the last year of life when she lived to be 90       Scored a 23 out of 30, (8/13/2021)       Scored a 25 out of 30, (January 2020)       Subtle  change over a year and a half         We discussed cognitive decline       Exelon 3 mg 1 tab p.o. twice daily      Depakote 250 mg 1 p.o. nightly        No GERD no diarrhea no lightheadedness no black tarry stools no blood in the stool      Does have severe hearing loss at baseline      B.  Has history of bipolar disorder        Neurologic review from original visit August 2021  Patient feels that he has had a falloff in his memory has more trouble tracking and following conversations  Used to go to a group with his retired friends from [employer] but they cannot get together due to Covid  Has severe hearing loss in both ears  Hearing doctor thought that they could maybe make a hearing aid that would work on the left ear but the not sure  With the mask on and talking to him he misses a lot of the conversation though  I am sure this is causing some difficulty with his memory recall and tracking conversations    Patient is still coherent can should chat about multiple topics  Can jump from topic to topic fairly easy  Does better when we talk about more things from the past  Scored a 23 out of 30, (8/13/2021)  Scored a 25 out of 30, (January 2020)  Subtle change over a year and a half        Neurologic summary:  Previously followed by Dr. Vikas Mcginnis  Mild cognitive impairment as far back as 2015  Patient works for [employer] as a   Retired in 1998  [facility] testing January 2020, 25 out of 30  Seems to have difficulty remembering people's names  Has significant hearing loss right greater than left left is not enough to be helped by hearing aid  Past history of bipolar disorder  Mother with dementia in the last year of her life at age 90  Uncle last year of life age 88 developed dementia  In the distant past of been on Depakote for 2 to 3 years for bipolar disorder  Has an abnormal EEG  Patient restarted on Depakote 2020  Talked about the pros and cons of using this to maybe stabilize the temporal lobe see if his symptoms are little  less severe may be slow down the progression  His daughter has depression and is on Depakote    Patient tends to be quite active rows or exercises on the exercise bike regularly  Watches his diet hemoglobin A1c's have been good  Tries to stay active volunteers and participates in activities      Past medical history  Mild cognitive impairment  Bipolar disorder  Prostate cancer  Benign prostatic hypertrophy  Seborrheic dermatitis  Osteoarthritis with shoulder surgery  Left leg edema    Habits   Non-smoker  Does not drink alcohol  Worked as a [employer]  retired in   Lives in seniors apartment with elevator  Does exercise regularly stays active      Family history  Mother had glaucoma congestive heart failure dementia  about age 90  Father  at 34 with testicular cancer  He has 1 sister with some diabetes  Daughter with depression treated with Depakote  Uncle with difficulty with memory at the age of 88 last year of his life      Work-up review  MRI scan brain 2016 mild to moderate atrophy progressed mildly from   Laboratory data 2019  Sodium 140 potassium 4.9 BUN 8 creatinine 0.8 glucose 94  White blood count 6.3 hemoglobin 13.2 platelets 313,000  Hemoglobin A1c 5.8%  Outside records from Dr. Mcginnis 2018, 2018 reviewed  EEG 2020 rare F7/T3 sharp waves with RAINE activity with hyperventilation mild to moderately abnormal  B12 689 ,TSH 1.57, (2020)    [facility]  2020,    25 out of 30   2021,   23 out of 30   2022,   22 out of 30  3/13/2023,    24 out of 30  2024,    19 out of 30    Laboratory data review                      2022          2023        3/2024          10/2024  NA/K             138/4.6          136/4.2       137/4.8         135/4.2  BUN/Cr          3.7/0.73        6/0.82          21.4/0.74     14.2/0.76  GLU               91                  87              100               90  AST               30                                     28  WBC/HGB     5.7/13.2         5.3/13.3     11.3/13.3  PLTs              226,000         227,000      25,000  HGBA1C        5.8                5.9  B12                                                        718  TSH                                                       1.52    Exam    Review of system   Pertinent positives and negatives  Chronic hearing loss left much worse than right has a hearing aid on the right  Reads lips    No hallucinations  No vivid dreams    Dizziness periodically  No diplopia  No dysarthria  No dysphagia  No diarrhea  No tremor  No chest pain no shortness of breath  No headache      Ataxia uses the walker and cane  Discussed gait safety    Otherwise review systems negative    General exam  Blood pressure 97/55, pulse 74  Alert oriented x3  HEENT significant for hearing loss in left ear and right ear poor hearing and has a hearing aid  Lungs clear  Abdomen soft  No edema the feet  Patient fan only eats 1 meal per day goes down to the cafeteria with his wheelchair being pushed    Neurologic exam  Alert orient x3  Normal prosody speech  Normal naming  Normal comprehension  Normal repetition  No aphasia  No neglect  [facility]  3/13/2023,    24 out of 30  8/12/2024,    19 out of 30    Cranial 2 through 12 are significant for  Deaf in left ear significant hearing loss of the other  No ophthalmoplegia  No nystagmus  Face symmetrical  Tongue twisters okay  Visual fields intact    Upper extremities  No drift no tremor normal finger-nose  Has chronic decreased range of motion of the shoulders right worse than left previous shoulder surgery    Lower extremities  Distal proximal strength good    Gait  Slow to get up pushing off with both hands on the wheelchair  Stands next to the wheelchair with some standby assistance slowly tries to march in place but has a little bit of retropulsion  Has flexed posture  High risk for falls mainly spends most of his time in the wheelchair but does  transfer              Assessment/Plan     1.  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       Mild cognitive impairment      Onset as far back as 2015       Family history of dementia in the later years mom and uncle       History of bipolar disorder       Abnormal EEG F7/T3 sharp wave    8/12/2024,    19 out of 30           Depakote 250 mg 1 tablet twice daily (increased 11/26/2024)       Exelon 3 mg capsule twice daily (added 8/13/2021)    Tolerating medication    Discussed neurodegenerative disease and progression  Discussed as time goes on there is less neurologic reserve  With significant hearing loss this can affect his cognition also  Discussed that more meds may not necessarily make things better.  Discussed gait safety   Patient's wife passed away May 2024, stressor that can exacerbate symptoms        Patient should be on for his dementia  Mirtazapine 45 mg 1 p.o. nightly  Exelon 3 mg p.o. twice daily  Depakote  250 mg tablet 1 tab p.o. twice daily (increased 11/26/2024)    Patient seems to be more agitated wonder if this is more from his bipolar disorder component  He also has marked decreased hearing and reads lips  He seems to ruminate on issues    If he is not doing better with the above changes could consider increasing Exelon to 4.5 mg twice daily  I only want to make 1 change at a time  We had a conference today with patient and daughter about the above  He will follow-up in 6 months    I think his psychiatric disease along with his age and previous cognitive difficulties have caught up with him at this time.  He used to be somewhat better when his wife was still living as I think she helped stabilize him cognitively    Multiple issues as above discussed  Emergency add-on due to behavioral difficulties with adjustment of medication    40 minutes total care time today  The longitudinal plan of care for the diagnosis(es)/condition(s) as documented were  addressed during this visit. Due to the added complexity in care, I will continue to support Patrick in the subsequent management and with ongoing continuity of care.      Follow-up in 6 months        As part of visit today  Reviewed multiple interactions through [facility]        Again, thank you for allowing me to participate in the care of your patient.        Sincerely,        chandler Rene MD

## 2024-11-26 NOTE — NURSING NOTE
Chief Complaint   Patient presents with    Mild cognitive impairment     He states he used to take mirtazapine one tablet before he went to bed, then another half during the night if needed. Now he has his medications given to him by nurses and takes a tablet and a half at the same time. This has been working well so far. He is able to sleep     Rolanda Lomeli LPN on 11/26/2024 at 11:05 AM

## 2024-11-26 NOTE — TELEPHONE ENCOUNTER
General Call      Reason for Call:  canceled VV today at 5pm.    What are your questions or concerns:  daughter calling into clinic asking why pt's virtual visit was canceled.   After chart review, no reason for cancel. After discussion, pt realized that pt called himself and canceled this.    Date of last appointment with provider:     Could we send this information to you in [place] [facility] or would you prefer to receive a phone call?:       Will close note.

## 2024-12-01 SDOH — HEALTH STABILITY: PHYSICAL HEALTH: ON AVERAGE, HOW MANY DAYS PER WEEK DO YOU ENGAGE IN MODERATE TO STRENUOUS EXERCISE (LIKE A BRISK WALK)?: 0 DAYS

## 2024-12-01 SDOH — HEALTH STABILITY: PHYSICAL HEALTH: ON AVERAGE, HOW MANY MINUTES DO YOU ENGAGE IN EXERCISE AT THIS LEVEL?: 0 MIN

## 2024-12-01 ASSESSMENT — SOCIAL DETERMINANTS OF HEALTH (SDOH): HOW OFTEN DO YOU GET TOGETHER WITH FRIENDS OR RELATIVES?: PATIENT DECLINED

## 2024-12-02 ENCOUNTER — OFFICE VISIT (OUTPATIENT)
Dept: FAMILY MEDICINE | Facility: CLINIC | Age: 89
End: 2024-12-02
Payer: COMMERCIAL

## 2024-12-02 VITALS
BODY MASS INDEX: 20.44 KG/M2 | OXYGEN SATURATION: 98 % | DIASTOLIC BLOOD PRESSURE: 64 MMHG | WEIGHT: 138 LBS | SYSTOLIC BLOOD PRESSURE: 120 MMHG | HEART RATE: 71 BPM | RESPIRATION RATE: 18 BRPM | HEIGHT: 69 IN | TEMPERATURE: 97.9 F

## 2024-12-02 DIAGNOSIS — G31.84 MCI (MILD COGNITIVE IMPAIRMENT): ICD-10-CM

## 2024-12-02 DIAGNOSIS — Z12.5 SCREENING FOR PROSTATE CANCER: ICD-10-CM

## 2024-12-02 DIAGNOSIS — F30.9 BIPOLAR I DISORDER, SINGLE MANIC EPISODE (H): ICD-10-CM

## 2024-12-02 DIAGNOSIS — R53.83 OTHER FATIGUE: ICD-10-CM

## 2024-12-02 DIAGNOSIS — K59.00 CONSTIPATION, UNSPECIFIED CONSTIPATION TYPE: ICD-10-CM

## 2024-12-02 DIAGNOSIS — R63.6 MILDLY UNDERWEIGHT ADULT: ICD-10-CM

## 2024-12-02 DIAGNOSIS — Z00.00 MEDICARE ANNUAL WELLNESS VISIT, SUBSEQUENT: Primary | ICD-10-CM

## 2024-12-02 DIAGNOSIS — F03.B0 MODERATE DEMENTIA WITHOUT BEHAVIORAL DISTURBANCE, PSYCHOTIC DISTURBANCE, MOOD DISTURBANCE, OR ANXIETY, UNSPECIFIED DEMENTIA TYPE (H): ICD-10-CM

## 2024-12-02 DIAGNOSIS — R10.84 ABDOMINAL PAIN, GENERALIZED: ICD-10-CM

## 2024-12-02 DIAGNOSIS — R73.03 PRE-DIABETES: ICD-10-CM

## 2024-12-02 LAB
BASOPHILS # BLD AUTO: 0 10E3/UL (ref 0–0.2)
BASOPHILS NFR BLD AUTO: 0 %
EOSINOPHIL # BLD AUTO: 0 10E3/UL (ref 0–0.7)
EOSINOPHIL NFR BLD AUTO: 0 %
ERYTHROCYTE [DISTWIDTH] IN BLOOD BY AUTOMATED COUNT: 13.2 % (ref 10–15)
EST. AVERAGE GLUCOSE BLD GHB EST-MCNC: 123 MG/DL
HBA1C MFR BLD: 5.9 % (ref 0–5.6)
HCT VFR BLD AUTO: 39.4 % (ref 40–53)
HGB BLD-MCNC: 13.4 G/DL (ref 13.3–17.7)
IMM GRANULOCYTES # BLD: 0 10E3/UL
IMM GRANULOCYTES NFR BLD: 1 %
LYMPHOCYTES # BLD AUTO: 1.3 10E3/UL (ref 0.8–5.3)
LYMPHOCYTES NFR BLD AUTO: 23 %
MCH RBC QN AUTO: 33.3 PG (ref 26.5–33)
MCHC RBC AUTO-ENTMCNC: 34 G/DL (ref 31.5–36.5)
MCV RBC AUTO: 98 FL (ref 78–100)
MONOCYTES # BLD AUTO: 0.4 10E3/UL (ref 0–1.3)
MONOCYTES NFR BLD AUTO: 7 %
NEUTROPHILS # BLD AUTO: 3.7 10E3/UL (ref 1.6–8.3)
NEUTROPHILS NFR BLD AUTO: 68 %
PLATELET # BLD AUTO: 235 10E3/UL (ref 150–450)
RBC # BLD AUTO: 4.03 10E6/UL (ref 4.4–5.9)
WBC # BLD AUTO: 5.4 10E3/UL (ref 4–11)

## 2024-12-02 PROCEDURE — 36415 COLL VENOUS BLD VENIPUNCTURE: CPT | Performed by: FAMILY MEDICINE

## 2024-12-02 PROCEDURE — 85025 COMPLETE CBC W/AUTO DIFF WBC: CPT | Performed by: FAMILY MEDICINE

## 2024-12-02 PROCEDURE — 83036 HEMOGLOBIN GLYCOSYLATED A1C: CPT | Performed by: FAMILY MEDICINE

## 2024-12-02 ASSESSMENT — ACTIVITIES OF DAILY LIVING (ADL)
TOILETING: 1-->ASSISTANCE (EQUIPMENT/PERSON) NEEDED
TOILETING_ISSUES: YES
WALKING_OR_CLIMBING_STAIRS_DIFFICULTY: YES
TOILETING: 0-->NOT TOILET TRAINED OR ASSISTANCE NEEDED (DEVELOPMENTALLY APPROPRIATE)
DIFFICULTY_COMMUNICATING: NO
FALL_HISTORY_WITHIN_LAST_SIX_MONTHS: NO
DRESSING/BATHING_DIFFICULTY: YES
DIFFICULTY_EATING/SWALLOWING: NO
WEAR_GLASSES_OR_BLIND: YES
CONCENTRATING,_REMEMBERING_OR_MAKING_DECISIONS_DIFFICULTY: NO
DOING_ERRANDS_INDEPENDENTLY_DIFFICULTY: YES
HEARING_DIFFICULTY_OR_DEAF: NO
CHANGE_IN_FUNCTIONAL_STATUS_SINCE_ONSET_OF_CURRENT_ILLNESS/INJURY: NO
EQUIPMENT_CURRENTLY_USED_AT_HOME: WHEELCHAIR, MANUAL

## 2024-12-02 ASSESSMENT — PAIN SCALES - GENERAL: PAINLEVEL_OUTOF10: NO PAIN (0)

## 2024-12-02 NOTE — PROGRESS NOTES
Preventive Care Visit  [facility]  Selvin Riley MD, MD, Family Medicine  Dec 2, 2024      Patrick was seen today for recheck medication, wellness visit and diabetes.    Diagnoses and all orders for this visit:    Medicare annual wellness visit, subsequent    Pre-diabetes  -     Hemoglobin A1c; Future  -     Comprehensive metabolic panel (BMP + Alb, Alk Phos, ALT, AST, Total. Bili, TP); Future  -     Hemoglobin A1c  -     Comprehensive metabolic panel (BMP + Alb, Alk Phos, ALT, AST, Total. Bili, TP)    Bipolar I disorder, single manic episode (H)    MCI (mild cognitive impairment)    Mildly underweight adult    Constipation, unspecified constipation type  -     TSH with free T4 reflex    Moderate dementia without behavioral disturbance, psychotic disturbance, mood disturbance, or anxiety, unspecified dementia type (H)    Other fatigue  -     TSH with free T4 reflex  -     CBC with Platelets & Differential    Abdominal pain, generalized  -     CT Abdomen Pelvis w/o & w Contrast; Future           Subjective   Patrick is a 89 year old, presenting for the following:  Recheck Medication, Wellness Visit, and Diabetes    He is here today with his daughter and son-in-law.  He resides at a assisted living residence.  There is mild cognitive impairment.  He has a history of bipolar disorder.  He recently saw neurology.  He has a history of prediabetes.  He has a history of prostate cancer that has been treated.    His wife  fairly recently.    He has had longstanding issues with constipation.    I been receiving numerous messages from Patrick directly regarding his concerns with constipation.  There was a period of time where he was sending me multiple messages a day about his concerns with bowel movements.  He is on a regiment of taking MiraLAX twice daily which he has done long-term based on his own decision in the past.  He also is taking fiber supplementation and eating prunes and prune juice daily.   It is difficult to tell despite repeated questioning whether Patrick truly has constipation where he has a sensation he must go and cannot or whether he is fixated on having a bowel movement at a certain point in time every day.  My concern is is that he is taking a large amount of medication and sometimes medications that go beyond what are recommended/excepted to be used to try and stimulate bowel movements.    His weight has been stable but daughter expresses concern about decreased appetite.    He really seems to be very fixated on bowel movements.  The more we talk about it the more I think there is less of a concern actually with the bowel movements and more of a concern with his fixation and overuse of medications.    Given the degree of uncertainty in his inability to communicate effectively what he feels with the situation we discussed a plan for doing some further evaluation with laboratory testing as well as obtaining a CT scan.  If there are no signs of any significant problems with his digestive tract or intra-abdominal weight I am much in favor of reducing the medications he takes to have a bowel movement and monitoring the situation.        12/2/2024     1:16 PM   Additional Questions   Roomed by danie vasquez   Accompanied by son in law Patrick and daughter?         12/2/2024   Forms   Any forms needing to be completed Yes              Health Care Directive  Patient has a Health Care Directive on file  Discussed advance care planning with patient.      12/1/2024   General Health   How would you rate your overall physical health? Good   Feel stress (tense, anxious, or unable to sleep) Not at all            12/1/2024   Nutrition   Diet: Regular (no restrictions)            12/1/2024   Exercise   Days per week of moderate/strenous exercise 0 days   Average minutes spent exercising at this level 0 min      (!) EXERCISE CONCERN      12/1/2024   Social Factors   Frequency of gathering with friends or relatives Patient  declined   Worry food won't last until get money to buy more Patient declined   Food not last or not have enough money for food? No   Do you have housing? (Housing is defined as stable permanent housing and does not include staying ouside in a car, in a tent, in an abandoned building, in an overnight shelter, or couch-surfing.) Yes   Are you worried about losing your housing? No   Lack of transportation? No   Unable to get utilities (heat,electricity)? No            12/2/2024   Fall Risk   Fallen 2 or more times in the past year? No   Trouble with walking or balance? Yes   Reason Gait Speed Test Not Completed Patient does not tolerate an upright or standing position (e.g. wheelchair)    Unable to complete test, patient reported symptoms       Multiple values from one day are sorted in reverse-chronological order          12/1/2024   Activities of Daily Living- Home Safety   Needs help with the following daily activites Transportation    Shopping   Safety concerns in the home None of the above       Multiple values from one day are sorted in reverse-chronological order         12/1/2024   Dental   Dentist two times every year? Yes            12/1/2024   Hearing Screening   Hearing concerns? None of the above            12/1/2024   Driving Risk Screening   Patient/family members have concerns about driving No            12/1/2024   General Alertness/Fatigue Screening   Have you been more tired than usual lately? No            12/1/2024   Urinary Incontinence Screening   Bothered by leaking urine in past 6 months No            12/1/2024   TB Screening   Were you born outside of the [place]? No                  12/1/2024   Substance Use   Alcohol more than 3/day or more than 7/wk Not Applicable   Do you have a current opioid prescription? No   How severe/bad is pain from 1 to 10? 0/10 (No Pain)   Do you use any other substances recreationally? No        Social History     Tobacco Use    Smoking status: Never     Passive  "exposure: Past    Smokeless tobacco: Never   Vaping Use    Vaping status: Never Used   Substance Use Topics    Alcohol use: No    Drug use: No             Reviewed and updated as needed this visit by Provider                      Current providers sharing in care for this patient include:  Patient Care Team:  Selvin Riley MD as PCP - General (Family Medicine)  Ramon Rene MD as Assigned Neuroscience Provider  Selvin Riley MD as Assigned PCP    The following health maintenance items are reviewed in Epic and correct as of today:  Health Maintenance   Topic Date Due    RSV VACCINE (1 - 1-dose 75+ series) Never done    MEDICARE ANNUAL WELLNESS VISIT  12/11/2024    ANNUAL REVIEW OF HM ORDERS  04/03/2025    FALL RISK ASSESSMENT  12/02/2025    DTAP/TDAP/TD IMMUNIZATION (2 - Td or Tdap) 11/24/2027    ADVANCE CARE PLANNING  06/26/2029    PHQ-2 (once per calendar year)  Completed    INFLUENZA VACCINE  Completed    Pneumococcal Vaccine: 65+ Years  Completed    ZOSTER IMMUNIZATION  Completed    COVID-19 Vaccine  Completed    HPV IMMUNIZATION  Aged Out    MENINGITIS IMMUNIZATION  Aged Out    RSV MONOCLONAL ANTIBODY  Aged Out       Complete review of systems is obtained.  Other than the specific considerations noted above complete review of systems is negative.       Objective    Exam  /64 (Cuff Size: Adult Regular)   Pulse 71   Temp 97.9  F (36.6  C) (Oral)   Resp 18   Ht 1.753 m (5' 9\")   Wt 62.6 kg (138 lb)   SpO2 98%   BMI 20.38 kg/m     Estimated body mass index is 20.38 kg/m  as calculated from the following:    Height as of this encounter: 1.753 m (5' 9\").    Weight as of this encounter: 62.6 kg (138 lb).      Wt Readings from Last 3 Encounters:   12/02/24 62.6 kg (138 lb)   11/26/24 62.6 kg (138 lb)   11/19/24 62.8 kg (138 lb 8 oz)        BP Readings from Last 6 Encounters:   12/02/24 120/64   11/26/24 97/55   11/19/24 103/66   11/13/24 129/86   10/22/24 94/72   10/15/24 116/72    "     Hemoglobin A1C   Date Value Ref Range Status   12/02/2024 5.9 (H) 0.0 - 5.6 % Final     Comment:     Normal <5.7%   Prediabetes 5.7-6.4%    Diabetes 6.5% or higher     Note: Adopted from ADA consensus guidelines.   12/11/2023 5.7 (H) 0.0 - 5.6 % Final     Comment:     Normal <5.7%   Prediabetes 5.7-6.4%    Diabetes 6.5% or higher     Note: Adopted from ADA consensus guidelines.   12/09/2022 5.8 (H) 0.0 - 5.6 % Final     Comment:     Normal <5.7%   Prediabetes 5.7-6.4%    Diabetes 6.5% or higher     Note: Adopted from ADA consensus guidelines.       Physical Exam      General Appearance:    Alert, cooperative, no distress   Eyes:   No scleral icterus or conjunctival irritation       Ears:  Hearing aid in the right ear   Throat:   Lips, mucosa, and tongue normal; teeth and gums normal   Neck:   Supple, symmetrical, trachea midline, no adenopathy;        thyroid:  No enlargement/tenderness/nodules   Lungs:     Clear to auscultation bilaterally, respirations unlabored, no wheezes or crackles   Heart:    Regular rate and rhythm,  No murmur   Abdomen:    Soft, no distention, he can tolerate rather deep palpation without any discomfort I feel no masses fullness or organomegaly     Extremities:  No edema, no joint swelling or redness, no evidence of any injuries   Skin:  No concerning skin findings, no suspicious moles, no rashes   Neurologic:  On gross examination there is no motor or sensory deficit.  Patient walks with a normal gait                     12/2/2024   Mini Cog   Mini-Cog Not Completed (choose reason) Known dementia    Known dementia       Multiple values from one day are sorted in reverse-chronological order              Signed Electronically by: Selvin Riley MD, MD

## 2024-12-03 LAB
ALBUMIN SERPL BCG-MCNC: 4 G/DL (ref 3.5–5.2)
ALP SERPL-CCNC: 74 U/L (ref 40–150)
ALT SERPL W P-5'-P-CCNC: 8 U/L (ref 0–70)
ANION GAP SERPL CALCULATED.3IONS-SCNC: 10 MMOL/L (ref 7–15)
AST SERPL W P-5'-P-CCNC: 20 U/L (ref 0–45)
BILIRUB SERPL-MCNC: 0.4 MG/DL
BUN SERPL-MCNC: 7.7 MG/DL (ref 8–23)
CALCIUM SERPL-MCNC: 9.2 MG/DL (ref 8.8–10.4)
CHLORIDE SERPL-SCNC: 96 MMOL/L (ref 98–107)
CREAT SERPL-MCNC: 0.74 MG/DL (ref 0.67–1.17)
EGFRCR SERPLBLD CKD-EPI 2021: 87 ML/MIN/1.73M2
GLUCOSE SERPL-MCNC: 84 MG/DL (ref 70–99)
HCO3 SERPL-SCNC: 28 MMOL/L (ref 22–29)
POTASSIUM SERPL-SCNC: 4.5 MMOL/L (ref 3.4–5.3)
PROT SERPL-MCNC: 6.1 G/DL (ref 6.4–8.3)
PSA SERPL DL<=0.01 NG/ML-MCNC: 0.25 NG/ML
SODIUM SERPL-SCNC: 134 MMOL/L (ref 135–145)
TSH SERPL DL<=0.005 MIU/L-ACNC: 1.26 UIU/ML (ref 0.3–4.2)

## 2024-12-04 ENCOUNTER — MYC MEDICAL ADVICE (OUTPATIENT)
Dept: NEUROLOGY | Facility: CLINIC | Age: 89
End: 2024-12-04
Payer: COMMERCIAL

## 2024-12-11 ENCOUNTER — HOSPITAL ENCOUNTER (OUTPATIENT)
Dept: CT IMAGING | Facility: HOSPITAL | Age: 89
Discharge: HOME | End: 2024-12-11
Attending: FAMILY MEDICINE
Payer: COMMERCIAL

## 2024-12-11 DIAGNOSIS — R10.84 ABDOMINAL PAIN, GENERALIZED: ICD-10-CM

## 2024-12-11 PROCEDURE — 250N000011 HC RX IP 250 OP 636: Performed by: FAMILY MEDICINE

## 2024-12-11 PROCEDURE — 74177 CT ABD & PELVIS W/CONTRAST: CPT

## 2024-12-11 RX ORDER — IOPAMIDOL 755 MG/ML
68 INJECTION, SOLUTION INTRAVASCULAR ONCE
Status: COMPLETED | OUTPATIENT
Start: 2024-12-11 | End: 2024-12-11

## 2024-12-11 RX ADMIN — IOPAMIDOL 68 ML: 755 INJECTION, SOLUTION INTRAVENOUS at 13:33

## 2024-12-12 ENCOUNTER — MYC MEDICAL ADVICE (OUTPATIENT)
Dept: FAMILY MEDICINE | Facility: CLINIC | Age: 89
End: 2024-12-12
Payer: COMMERCIAL

## 2024-12-12 DIAGNOSIS — F03.B0 MODERATE DEMENTIA WITHOUT BEHAVIORAL DISTURBANCE, PSYCHOTIC DISTURBANCE, MOOD DISTURBANCE, OR ANXIETY, UNSPECIFIED DEMENTIA TYPE (H): Primary | ICD-10-CM

## 2024-12-18 ENCOUNTER — HOSPITAL ENCOUNTER (OUTPATIENT)
Dept: ULTRASOUND IMAGING | Facility: HOSPITAL | Age: 89
Discharge: HOME | End: 2024-12-18
Attending: FAMILY MEDICINE
Payer: COMMERCIAL

## 2024-12-18 DIAGNOSIS — N50.89 SCROTAL EDEMA: ICD-10-CM

## 2024-12-18 PROCEDURE — 93976 VASCULAR STUDY: CPT

## 2024-12-19 ENCOUNTER — PATIENT OUTREACH (OUTPATIENT)
Dept: CARE COORDINATION | Facility: CLINIC | Age: 89
End: 2024-12-19
Payer: COMMERCIAL

## 2024-12-19 NOTE — PROGRESS NOTES
Clinic Care Coordination Contact  [facility]/Rae    Clinical Data: Care Coordinator Outreach    Outreach Documentation Number of Outreach Attempt   12/19/2024  12:41 PM 1       Left message on patient's daughter Jessica morel with call back information and requested return call.      Plan: Care Coordinator CHW to discuss recent CC referral with patient/family and provide resources/assistance  Care Coordinator will try to reach patient again in 1-2 business days.    Comments    Please contact daughter Jessica to discuss family resources for helping navigate evolving dementia process in her father.  Please refer to most recent MyChart note from daughter.            Order Questions    Question Answer   Reason for Referral: Patient/Caregiver Support   Patient/Caregiver Suport: Resources for Support   Clinical Staff have discussed the Care Coordination Referral with the patient and/or caregiver: Yes     Elena VEGA  Community Health Worker  [facility] Care Coordination  Jeremy [email or web address] [email or web address]  [email or web address]  Office: [phone]

## 2024-12-21 ENCOUNTER — NURSE TRIAGE (OUTPATIENT)
Dept: NURSING | Facility: CLINIC | Age: 89
End: 2024-12-21
Payer: COMMERCIAL

## 2024-12-21 ENCOUNTER — MYC MEDICAL ADVICE (OUTPATIENT)
Dept: NEUROLOGY | Facility: CLINIC | Age: 89
End: 2024-12-21
Payer: COMMERCIAL

## 2024-12-21 NOTE — TELEPHONE ENCOUNTER
"Caller is pt's daughter (Vaishali).  On Consent to Communicate.  Currently with pt.  Reporting insomnia.  Has been taking melatonin prn in addition to mirtazepine taken on a scheduled basis nightly.  Insomnia ongoing x one month.  Pt denies pain, denies upper resp symptoms.  No known reason for night-waking.  Asks about add'l sleep aids.  Discussed a warm snack at bedtime (such as warm milk with a peanut butter cracker or cookie).  Explained some fat-content in a snack facilitates sleep for a longer period.    Pt and family still wish to pursue medication options.  Therefore advised daughter to call back Monday upon clinic open hours (7 am) to request same-day virtual visit appt for this issue.  Daughter agrees to do so.    Kathy Palm RN  [facility] Nurse Advisor     Reason for Disposition   [1] Insomnia persists > 1 week AND [2] no improvement after using Care Advice   Requesting medication for sleep (\"sleeping pill\")    Additional Information   Negative: Difficulty breathing   Negative: Depression is suspected   Negative: Traumatic Brain Injury (TBI) is suspected   Negative: [1] Pain is causing insomnia AND [2] pain is not a chronic symptom (recurrent or ongoing AND present > 4 weeks)   Negative: [1] Pain is causing insomnia AND [2] pain is a chronic symptom (recurrent or ongoing AND present > 4 weeks)    Protocols used: Insomnia-A-AH    "

## 2024-12-23 ENCOUNTER — TELEPHONE (OUTPATIENT)
Dept: NEUROLOGY | Facility: CLINIC | Age: 89
End: 2024-12-23
Payer: COMMERCIAL

## 2024-12-23 NOTE — TELEPHONE ENCOUNTER
[facility]    Phone Message    May a detailed message be left on voicemail: yes     Reason for Call: Pts daughter Vaishali says her dad has had some issues with a sleeping, and would like to discuss that, and a new alzheimer's drug.      Please call Vaishali at [phone] to discuss further.    Action Taken: Message routed to:  Other: [facility]    Travel Screening: Not Applicable     Date of Service:

## 2024-12-23 NOTE — PROGRESS NOTES
In person evaluation    HPI  January 1, 2020, in person consultation transfer of care from Dr. Vikas Mcginnis  4/27/2020, telephone visit  8/3/2020, telephone visit  11/6/2020, video visit  8/13/2021, in person visit  2/16/2022, in person visit  8/19/2022, in person visit  3/13/2023, in person visit  11/3/2023, in person visit  8/12/2024, in person visit  11/26/2024, in person visit  12/31/2024, in person visit        89-year-old followed neurologically for:  Cognitive decline (memory loss)  Onset as far back as 2015  Difficulty remembering people's names  Previously cared for by Dr. Vikas Mcginnis  History of bipolar disorder  Abnormal EEG      Since last seen November 2024  Multiple EMR interactions (7 since last visit)  Patient added on again emergently for reevaluation in regards to his dementia/bipolar disorder  Patient accompanied by family members    Patient has difficulty with rumination about his bowel movements  Patient has difficulty with being anxious and cannot get to sleep  Patient sometimes does not remember what bothers him but he writes everything down in a notebook  When I ask him what his biggest concern as he talks about his bowel movements which he is fixated on also    His care center has taken over his medications  He does use the melatonin himself        Mirtazapine 45 mg 1 p.o. nightly  Rivastigmine 3 mg 1 p.o. twice daily  Depakote 250 mg tablet 1 tab p.o. twice daily    Patient having a lot of difficulty with sleep  Uses the mirtazapine at 8-9 PM  Also is using melatonin  Assisted living has taken over doing his medications  Patient feels that since they took over his meds he is having a lot more trouble sleeping.    Family is wondering about infusions for Alzheimer's disease.  We reviewed the actual MRI scan from 2016 showing atrophy  I am concerned that with his cognitive decline going on for quite some time and his age that the risks of these antiamyloid infusions are greater than the benefit  that he would have  After discussion with the patient and his family members we all decided that we would hold off on that type of intervention.    Patient has had difficulty with memory troubles going on at least since 2015  Complicated by some bipolar disorder which makes it difficult to know how much is from a gene and his psych disease  Difficulty given exact staging of his memory disorder but he scores relatively okay on MoCA test in the past  I would put him in the mild to moderate range due to the combination of medical issues    Patient is chronically in the wheelchair  He can transfer from the bed to the wheelchair  He can transfer off the toilet which is a raised toilet seat okay                Emergency add-on visit 11/26/2024:  Emergency add-on due to increasing behavioral difficulties  Multiple interactions through [facility]  Patient with increasing confusion/anxiety, patient requiring increasing care needs.  Primary worked on getting patient into care setting.    Patient is revved up about a lot of issues  Has decreased hearing  Seems to fixate on a topic and gets anxious/agitated  Is sleeping better with the 45 mg of mirtazapine at night  I think we should also increase the Depakote though during the day  Does have a history of bipolar disorder    Lives at [facility]  They take care of his meds  Gets a shower 2 times per week  He goes down for supper  Has some Ensure and some nuts in the morning  Has chronic decreased range of motion of his shoulders from past shoulder trouble        Patient on some mirtazapine to help with mood and sleep  Relative rapid increase in medication after going to care center from 30 mg to 45  Patient also has difficulty with bipolar disorder    Patient should be on for his dementia  Mirtazapine 45 mg 1 p.o. nightly  Exelon 3 mg p.o. twice daily  Depakote  250 mg tablet 1 tab p.o. twice daily (increased 11/26/2024)    November 2024 visit review:  ENT  visit 12/12/2023 dizziness episodic/stable hearing loss on right side/Ménière's disease left side  PMD visit 2/5/2024 pressure ulcer left hip  NP visit 6/26/2024 (mobility difficulty/imbalance)  PMD visit 7/16/2024 physical deconditioning ordered PT/OT  No hospitalizations  No surgeries    MoCA  8/12/2024,    19 out of 30  Patient has been in the wheelchair more due to poor balance  Patient's wife passed away in May 2024 which is a major stressor    Lives in assisted living  Decreased mobility  Transferring from wheelchair to bed    Does have dizziness/vertigo  Has significant hearing loss left ear is very bad, right ear has a hearing aid and it kind of works  Patient does better when he can read lips     No hallucinations/no vivid dreams  Gets up once at nighttime to go to the bathroom  Uses walker/wheelchair  Sometimes if he moves too quick he will have a little bit of dizziness    No faintness  Has some difficulty with bradycardia  No diarrhea    Only eats 1 meal a day but that is his choice  No hallucinations no vivid dreams    Walks okay with the 4 wheeled walker with hand break  Family thought that maybe he fatigues with the 25-minute walk  Back when he had COVID a year before it kind of took a lot out of him but he rallied a bit      Mirtazapine 30 mg at nighttime to help with sleep prescribed by other physicians    Forgetfulness is about the same although 8 years have gone by, it has fallen off a bit.              A.  Mild cognitive impairment       Onset as far back as 2015       Trouble remembering people's names       Past abnormal EEG       Previously cared for by Dr. Vikas Mcginnis       Mother had some dementia the last year of life when she lived to be 90       Scored a 23 out of 30, (8/13/2021)       Scored a 25 out of 30, (January 2020)       Subtle change over a year and a half         We discussed cognitive decline       Exelon 3 mg 1 tab p.o. twice daily      Depakote 250 mg 1 p.o. twice daily  (started 11/26/2024)      Depakote  mg 2 p.o. twice daily, (increased 12/31/2024)          No GERD no diarrhea no lightheadedness no black tarry stools no blood in the stool      Does have severe hearing loss at baseline      B.  Has history of bipolar disorder        Neurologic review from original visit August 2021  Patient feels that he has had a falloff in his memory has more trouble tracking and following conversations  Used to go to a group with his retired friends from [employer] but they cannot get together due to Covid  Has severe hearing loss in both ears  Hearing doctor thought that they could maybe make a hearing aid that would work on the left ear but the not sure  With the mask on and talking to him he misses a lot of the conversation though  I am sure this is causing some difficulty with his memory recall and tracking conversations    Patient is still coherent can should chat about multiple topics  Can jump from topic to topic fairly easy  Does better when we talk about more things from the past  Scored a 23 out of 30, (8/13/2021)  Scored a 25 out of 30, (January 2020)  Subtle change over a year and a half        Neurologic summary:  Previously followed by Dr. Vikas Mcginnis  Mild cognitive impairment as far back as 2015  Patient works for [employer] as a   Retired in 1998  MoCA testing January 2020, 25 out of 30  Seems to have difficulty remembering people's names  Has significant hearing loss right greater than left left is not enough to be helped by hearing aid  Past history of bipolar disorder  Mother with dementia in the last year of her life at age 90  Uncle last year of life age 88 developed dementia  In the distant past of been on Depakote for 2 to 3 years for bipolar disorder  Has an abnormal EEG  Patient restarted on Depakote 2020  Talked about the pros and cons of using this to maybe stabilize the temporal lobe see if his symptoms are little less severe may be slow down the progression  His  daughter has depression and is on Depakote    Patient tends to be quite active rows or exercises on the exercise bike regularly  Watches his diet hemoglobin A1c's have been good  Tries to stay active volunteers and participates in activities      Past medical history  Mild cognitive impairment  Bipolar disorder  Prostate cancer  Benign prostatic hypertrophy  Seborrheic dermatitis  Osteoarthritis with shoulder surgery  Left leg edema    Habits   Non-smoker  Does not drink alcohol  Worked as a [employer]  retired in   Lives in seniors apartment with elevator  Does exercise regularly stays active      Family history  Mother had glaucoma congestive heart failure dementia  about age 90  Father  at 34 with testicular cancer  He has 1 sister with some diabetes  Daughter with depression treated with Depakote  Uncle with difficulty with memory at the age of 88 last year of his life      Work-up review  MRI scan brain 2016 mild to moderate atrophy progressed mildly from   Laboratory data 2019  Sodium 140 potassium 4.9 BUN 8 creatinine 0.8 glucose 94  White blood count 6.3 hemoglobin 13.2 platelets 313,000  Hemoglobin A1c 5.8%  Outside records from Dr. Mcginnis 2018, 2018 reviewed  EEG 2020 rare F7/T3 sharp waves with RAINE activity with hyperventilation mild to moderately abnormal  B12 689 ,TSH 1.57, (2020)    MoCA  2020,    25 out of 30   2021,   23 out of 30   2022,   22 out of 30  3/13/2023,    24 out of 30  2024,    19 out of 30    Laboratory data review                      2022          2023        3/2024          10/2024       2024  NA/K             138/4.6          136/4.2       137/4.8         135/4.2        134/4.5  BUN/Cr          3.7/0.73        6/0.82          21.4/0.74     14.2/0.76     7.7/0.74  GLU               91                  87              100               90                84  AST               30                                     28                                     20  WBC/HGB     5.7/13.2         5.3/13.3     11.3/13.3                           5.4/13.4  PLTs              226,000         227,000      205,000                            2035,000  HGBA1C        5.8                5.9                                                       5.9  B12                                                        718  TSH                                                       1.52                                   1.26          Exam    Review of system   Pertinent positives and negatives  Chronic hearing loss left much worse than right has a hearing aid on the right  Reads lips    No hallucinations  No vivid dreams    Dizziness periodically  No diplopia  No dysarthria  No dysphagia  No diarrhea  No tremor  No chest pain no shortness of breath  No headache      Ataxia uses the walker and cane  Discussed gait safety    Otherwise review systems negative    General exam  Blood pressure 96/50, pulse 66  Alert oriented x3  HEENT significant for hearing loss in left ear and right ear poor hearing and has a hearing aid  Lungs clear  Abdomen soft  No edema the feet  Patient fan only eats 1 meal per day goes down to the cafeteria with his wheelchair being pushed    Neurologic exam  Alert orient x3  Normal prosody speech  Normal naming  Normal comprehension  Normal repetition  No aphasia  No neglect  MoCA  3/13/2023,    24 out of 30  8/12/2024,    19 out of 30    Cranial 2 through 12 are significant for  Deaf in left ear significant hearing loss of the other  No ophthalmoplegia  No nystagmus  Face symmetrical  Tongue twisters okay  Visual fields intact    Upper extremities  No drift no tremor normal finger-nose  Has chronic decreased range of motion of the shoulders right worse than left previous shoulder surgery    Lower extremities  Distal proximal strength good    Gait  Slow to get up pushing off with both hands on the wheelchair  Stands next to  the wheelchair with some standby assistance slowly tries to march in place but has a little bit of retropulsion  Has flexed posture  High risk for falls mainly spends most of his time in the wheelchair but does transfer              Assessment/Plan     1.  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       Mild cognitive impairment      Onset as far back as 2015       Family history of dementia in the later years mom and uncle       History of bipolar disorder       Abnormal EEG F7/T3 sharp wave    8/12/2024,    19 out of 30           Depakote 250 mg 1 tablet twice daily (increased 11/26/2024)       Exelon 3 mg capsule twice daily (added 8/13/2021)    Tolerating medication    Discussed neurodegenerative disease and progression  Discussed as time goes on there is less neurologic reserve  With significant hearing loss this can affect his cognition also  Discussed that more meds may not necessarily make things better.  Discussed gait safety   Patient's wife passed away May 2024, stressor that can exacerbate symptoms      I think his psychiatric disease along with his age and previous cognitive difficulties have caught up with him at this time.  He used to be somewhat better when his wife was still living as I think she helped stabilize him cognitively      Patient has difficulty with rumination about his bowel movements  Patient has difficulty with being anxious and cannot get to sleep  Patient sometimes does not remember what bothers him but he writes everything down in a notebook  When I ask him what his biggest concern as he talks about his bowel movements which he is fixated on also    His care center has taken over his medications  He does use the melatonin himself        Family is wondering about infusions for Alzheimer's disease.  We reviewed the actual MRI scan from 2016 showing atrophy  I am concerned that with his cognitive decline going on for quite some time and his  "age that the risks of these antiamyloid infusions are greater than the benefit that he would have  After discussion with the patient and his family members we all decided that we would hold off on that type of intervention.    Patient has had difficulty with memory troubles going on at least since 2015  Complicated by some bipolar disorder which makes it difficult to know how much is from a gene and his psych disease  Difficulty given exact staging of his memory disorder but he scores relatively okay on MoCA test in the past  I would put him in the mild to moderate range due to the combination of medical issues    Patient is chronically in the wheelchair  He can transfer from the bed to the wheelchair  He can transfer off the toilet which is a raised toilet seat okay    Multiple issues discussed and reviewed on this emergency add-on visit  Will check labs in 3 months  Depakote level  Ammonia level  Electrolytes  AST  CBC with platelets    Follow-up in 4 months  Spend extended amount of time discussing with patient and family the role of medications to adjust symptoms but they cannot alleviate them completely  Discussed that as his condition and age progress symptom management may become more difficult  I will let the primary work on his difficulty with \"bowel movements\" which seemed of gotten worse since he has been wheelchair-bound    Total care time today 45 minutes  The longitudinal plan of care for the diagnosis(es)/condition(s) as documented were addressed during this visit. Due to the added complexity in care, I will continue to support Patrick in the subsequent management and with ongoing continuity of care.        As part of visit today  Reviewed multiple interactions through [facility]  Reviewed laboratory data  Emergency add-on visit    "

## 2024-12-23 NOTE — TELEPHONE ENCOUNTER
Dtr also sent [employer] message 12/21/24.  That message was already responded to.     See 12/21/24 [employer] encounter.     Andria RN, BSN  [facility]

## 2024-12-24 ENCOUNTER — PATIENT OUTREACH (OUTPATIENT)
Dept: NURSING | Facility: CLINIC | Age: 89
End: 2024-12-24
Payer: COMMERCIAL

## 2024-12-24 ASSESSMENT — ACTIVITIES OF DAILY LIVING (ADL)
DEPENDENT_IADLS:: CLEANING;COOKING;LAUNDRY;SHOPPING;MEAL PREPARATION;MEDICATION MANAGEMENT;MONEY MANAGEMENT;TRANSPORTATION;INCONTINENCE

## 2024-12-24 NOTE — Clinical Note
Enrolling in care coordination. Thanks Cookie Parker, Jessica is open to calls at 10 AM if that works for you.  That is 8 am her time and she works from home.  Thanks

## 2024-12-24 NOTE — LETTER
[facility] CARE COORDINATION  [facility]  December 24, 2024    Patrick Wharton  [address]  [place] [address]      Dear Patrick,    I am a clinic care coordinator who works with Selvin Riley MD, MD with the [facility]. I wanted to thank you for spending the time to talk with me.  Below is a description of clinic care coordination and how I can further assist you.       The clinic care coordination team is made up of a registered nurse, , financial resource worker and community health worker who understand the health care system. The goal of clinic care coordination is to help you manage your health and improve access to the health care system. Our team works alongside your provider to assist you in determining your health and social needs. We can help you obtain health care and community resources, providing you with necessary information and education. We can work with you through any barriers and develop a care plan that helps coordinate and strengthen the communication between you and your care team.  Our services are voluntary and are offered without charge to you personally.    Please feel free to contact me with any questions or concerns regarding care coordination and what we can offer.      We are focused on providing you with the highest-quality healthcare experience possible.    Sincerely,     Cookie Syed,   [facility]  [phone]      Enclosed: I have enclosed a copy of the Patient Centered Plan of Care. This has helpful information and goals that we have talked about. Please keep this in an easy to access place to use as needed.

## 2024-12-24 NOTE — PROGRESS NOTES
Clinic Care Coordination Contact  Clinic Care Coordination Contact  OUTREACH    Referral Information:  Referral Source: PCP    Primary Diagnosis: Cognitive Impairment    Chief Complaint   Patient presents with    Clinic Care Coordination - Initial        Universal Utilization: appropriate  Clinic Utilization  Difficulty keeping appointments:: No  Compliance Concerns: No  No-Show Concerns: No  No PCP office visit in Past Year: No  Utilization      No Show Count (past year)  1             ED Visits  0             Hospital Admissions  0                    Current as of: 12/24/2024 11:12 AM                Clinical Concerns: Talked to Jessica, daughter who lives on hospitals, is a nurse, on consent to communicate.  Current Medical Concerns:  Alzheimer's disease.  Not sleeping well. Ordering pills off the internet to take to help with sleep.   Doesn't walk anymore and has to be wheeled to meals.   Current Behavioral Concerns: Anxiety, depression.  Working with neurology.   Doesn't want to attend any activities in [place].   Education Provided to patient: Reviewed role of care coordination and resources.    Pain  Pain (GOAL):: Yes  Type: Chronic (>3mo)  Location of chronic pain:: both knees  Radiating: No  Progression: Unchanged  Description of pain: Aching  Limitation of routine activities due to chronic pain:: Yes  Description: Unable to perform most daily activities (chores, hobbies, social activities, driving)  Alleviating Factors: Other  Aggravating Factors: Activity  Health Maintenance Reviewed: Due/Overdue   Health Maintenance Due   Topic Date Due    RSV VACCINE (1 - 1-dose 75+ series) Never done       Clinical Pathway: None    Medication Management:  Medication review status: Medications reviewed and no changes reported per patient.        No concern.s      Functional Status:  Dependent ADLs:: Ambulation-cane, Ambulation-walker, Bathing, Dressing, Wheelchair-with assist  Dependent IADLs:: Cleaning, Cooking, Laundry,  Shopping, Meal Preparation, Medication Management, Money Management, Transportation, Incontinence  Bed or wheelchair confined:: Yes  Mobility Status: Dependent/Assisted by Another  Fallen 2 or more times in the past year?: (!) Yes  Any fall with injury in the past year?: No    Living Situation:  Current living arrangement:: I live in assisted living ([facility])  Type of residence:: Assisted living    Lifestyle & Psychosocial Needs:    Social Drivers of Health     Food Insecurity: Low Risk  (12/21/2024)    Food Insecurity     Within the past 12 months, did you worry that your food would run out before you got money to buy more?: No     Within the past 12 months, did the food you bought just not last and you didn t have money to get more?: No   Depression: Not at risk (2/5/2024)    PHQ-2     PHQ-2 Score: 0   Housing Stability: Low Risk  (12/21/2024)    Housing Stability     Do you have housing? : Yes     Are you worried about losing your housing?: No   Tobacco Use: Low Risk  (12/2/2024)    Patient History     Smoking Tobacco Use: Never     Smokeless Tobacco Use: Never     Passive Exposure: Past   Financial Resource Strain: Low Risk  (12/21/2024)    Financial Resource Strain     Within the past 12 months, have you or your family members you live with been unable to get utilities (heat, electricity) when it was really needed?: No   Alcohol Use: Not At Risk (12/21/2024)    AUDIT-C     Frequency of Alcohol Consumption: Never     Average Number of Drinks: Patient does not drink     Frequency of Binge Drinking: Never   Transportation Needs: Low Risk  (12/21/2024)    Transportation Needs     Within the past 12 months, has lack of transportation kept you from medical appointments, getting your medicines, non-medical meetings or appointments, work, or from getting things that you need?: No   Physical Activity: Inactive (12/21/2024)    Exercise Vital Sign     Days of Exercise per Week: 0 days     Minutes of Exercise per  Session: 0 min   Interpersonal Safety: Low Risk  (2024)    Interpersonal Safety     Do you feel physically and emotionally safe where you currently live?: Yes     Within the past 12 months, have you been hit, slapped, kicked or otherwise physically hurt by someone?: No     Within the past 12 months, have you been humiliated or emotionally abused in other ways by your partner or ex-partner?: No   Stress: Stress Concern Present (2024)    [employer] [place] [employer] - Occupational Stress Questionnaire     Feeling of Stress : Very much   Social Connections: Moderately Isolated (2024)    Social Connection and Isolation Panel [NHANES]     Frequency of Communication with Friends and Family: More than three times a week     Frequency of Social Gatherings with Friends and Family: Three times a week     Attends Congregation Services: 1 to 4 times per year     Active Member of Clubs or Organizations: No     Attends Club or Organization Meetings: Never     Marital Status:    Health Literacy: Not on file     Diet:: Regular  Inadequate nutrition (GOAL):: No  Tube Feeding: No  Inadequate activity/exercise (GOAL):: No  Significant changes in sleep pattern (GOAL): No  Transportation means:: Family, Regular car     Congregation or spiritual beliefs that impact treatment:: No  Mental health DX:: Yes  Mental health DX how managed:: Medication, Other  Mental health management concern (GOAL):: No  Chemical Dependency Status: No Current Concerns  Informal Support system:: Children, Yvette based, Other, Family     Patient and his wife moved into [facility] in 2024.  His wife  in spring.  Patient has Jessica, another daughter and son who live here. Jessica is the person who gets things done so wants to get more information on creating a good caregiving team with her siblings.  Her sister visits dad daily and is able to drive him when necessary. Jessica is concerned about his using his computer  to order things online that may harm him and is giving more to charitable causes than normal.  He is sending many My chart messages to his providers complaining about a lack of sleep. She has worked with his  to get help. This person recommends taking him off his accounts and taking away his credit card. They are reluctant to do that as he will be angry. Discussed how to communicate with him and that they need to keep his safety in mind as the first priority.  She had trouble getting their long term care insurance to pay anything when her mom was ill. Her sister has mental illness and she worries about what happens if she has a time when she is not able to assist dad.  Brother is not around much to help. Discussed that she may want to hire a geriatric care manager to assist with his needs and Jessica was interested.  Gave her several options including [facility] and [facility].  He has resources to pay for this care.  They maybe able to navigate getting him access to his long term care benefits.  She would like to have a family meeting to discuss his needs and options. Gave information on the Alzheimer's Association and she will contact them.          Resources and Interventions:  Current Resources:   Skilled Home Care Services: Home Health Aid  Community Resources: Housekeeping/Chore Agency, Other (see comment) (assisted living)  Supplies Currently Used at Home: Incontinence Supplies, Nutritional Supplements, Compression Stockings, Hearing Aid Batteries, Other  Equipment Currently Used at Home: cane, straight, grab bar, toilet, raised toilet seat, shower chair, walker, rolling, wheelchair, manual  Employment Status: retired         Advance Care Plan/Directive  Advanced Care Plans/Directives on file:: Yes  Status of record:: On File and Validated  Type Advanced Care Plans/Directives: Advanced Directive - On File    Referrals Placed: Alzheimer's Association, Other (geriatric case manager)     Care  Plan:  Care Plan: General       Problem: Family will have the support and education to care for patient who has Alzheimer's Disease       Long-Range Goal: Family will have the support and education to care for patient who has Alzheimer's Disease       Start Date: 12/24/2024 Expected End Date: 12/23/2025    Priority: High    Note:     Barriers: dementia  Strengths: lives in assisted living, has family support, has long term care insurance and assets.  Patient expressed understanding of goal: daughter Jessica does  Action steps to achieve this goal:  1. Jessica will contact the Alzheimer's Association to learn about their services and use any that interest her and her siblings.  2. I will review geriatric  from [facility] and [facility] and utilize their supports if interested.   3. I will report progress towards this goal at scheduled outreach telephone calls from the CCC team.                                  Patient/Caregiver understanding: Jessica enrolled in care coordination.     Outreach Frequency: monthly, more frequently as needed  Future Appointments                In 1 week Ramon Rene [facility]    In 1 week Selvin Riley MD [facility]    In 4 months Ramon Rene [facility]    In 7 months Ramon Rene MD [facility]            Plan: [facility] SW will continue to monitor, support patient with current goals and will be available to assist as needs arise. [facility] CHW will reach out to patient on a monthly basis to discuss progression of goals.      [facility] SW will perform Chart Review in 45 days.

## 2024-12-24 NOTE — LETTER
[facility]  Patient Centered Plan of Care  About Me:        Patient Name:  Vita Santiago    YOB: 1935  Age:         89 year old   Aaron MRN:    [id number] Telephone Information:  Home Phone [phone]   Mobile [phone]       Address:  [address] [place] [address] Email address:  [email or web address]      Emergency Contact(s)    Name Relationship Lgl Grd Work Phone Home Phone Mobile Phone   1. ANITHA SANTIAGO Daughter   [phone]    2. VITA SANTIAGO Son No  [phone]   3. SANCHEZ SANCHES* Daughter No  [phone]           Primary language:  English     needed? No   [facility] [employer] [facility]:  [phone] op. 1  Other communication barriers:Cognitive impairment    Preferred Method of Communication:     Current living arrangement: I live in assisted living ([facility])    Mobility Status/ Medical Equipment: Dependent/Assisted by Another        Health Maintenance  Health Maintenance Reviewed: Due/Overdue   Health Maintenance Due   Topic Date Due    RSV VACCINE (1 - 1-dose 75+ series) Never done           My Access Plan  Medical Emergency 911   Primary Clinic Line [facility] [phone]   24 Hour Appointment Line [phone] or  [phone]) (toll-free)   24 Hour Nurse Line [phone] (toll-free)   Preferred Urgent Care [facility] [phone]     [facility]  [phone]     Preferred Pharmacy [facility] [address] [place] [facility] [place] [place] - [address]      Behavioral Health Crisis Line The National Suicide Prevention Lifeline at 1-960.778.6651 or Text/Call 358           My Care Team Members  Patient Care Team         Relationship Specialty Notifications Start End    Selvin Riley MD PCP - General Family Medicine  11/13/24     Phone: [phone] Fax: [phone] [address] [facility] [address] [facility] [address]     Ramon Rene MD Assigned Neuroscience Provider   10/23/20     Phone: [phone] Fax: [phone] [address]  [facility] [address]    Selvin Riley MD Assigned PCP   6/16/21     Phone: [phone] Fax: [phone]         [address] [facility] [address]    Cookie Syed LSW Lead Care Coordinator Primary Care - CC Admissions 12/20/24     Phone: [phone]         Charito Moy CHW Community Health Worker  Admissions 12/24/24                 My Care Plans  Self Management and Treatment Plan    Care Plan  Care Plan: General       Problem: Family will have the support and education to care for patient who has Alzheimer's Disease       Long-Range Goal: Family will have the support and education to care for patient who has Alzheimer's Disease       Start Date: 12/24/2024 Expected End Date: 12/23/2025    Priority: High    Note:     Barriers: dementia  Strengths: lives in assisted living, has family support, has long term care insurance and assets.  Patient expressed understanding of goal: latha Lopez does  Action steps to achieve this goal:  1. Jessica will contact the Alzheimer's Association to learn about their services and use any that interest her and her siblings.  2. I will review geriatric  from [facility] and utilize their supports if interested.   3. I will report progress towards this goal at scheduled outreach telephone calls from the CCC team.                                     Advance Care Plans/Directives:   Advanced Care Plan/Directives on file: Yes    Status of Document(s): On File and Validated    Advanced Care Plan/Directives Type: Advanced Directive - On File           My Medical and Care Information  Problem List   Patient Active Problem List   Diagnosis    Anemia    Benign prostatic hyperplasia    Bipolar I disorder, single manic episode (H)    Family history of malignant neoplasm of prostate    Foot swelling    Impaired  fasting glucose    Leg edema, left    Moderate dementia without behavioral disturbance, psychotic disturbance, mood disturbance, or anxiety, unspecified dementia type (H)    Memory loss    Mild cognitive disorder    Osteoarthritis of left shoulder, unspecified osteoarthritis type    Pre-diabetes    Routine general medical examination at a health care facility    Seborrheic keratoses    Slow transit constipation    Status post total replacement of left shoulder    Non-recurrent unilateral inguinal hernia without obstruction or gangrene    Mildly underweight adult    History of prostate cancer    Physical deconditioning      Current Medications:  Please refer to the most recent medication list provided to you by your medical team and reach out to your provider with any questions or to make any corrections.    Care Coordination Start Date: 12/17/2024   Frequency of Care Coordination: monthly, more frequently as needed     Form Last Updated: 12/24/2024

## 2024-12-28 ENCOUNTER — MYC MEDICAL ADVICE (OUTPATIENT)
Dept: NEUROLOGY | Facility: CLINIC | Age: 89
End: 2024-12-28
Payer: COMMERCIAL

## 2024-12-31 ENCOUNTER — VIRTUAL VISIT (OUTPATIENT)
Dept: FAMILY MEDICINE | Facility: CLINIC | Age: 89
End: 2024-12-31
Payer: COMMERCIAL

## 2024-12-31 ENCOUNTER — OFFICE VISIT (OUTPATIENT)
Dept: NEUROLOGY | Facility: CLINIC | Age: 89
End: 2024-12-31
Payer: COMMERCIAL

## 2024-12-31 VITALS
SYSTOLIC BLOOD PRESSURE: 96 MMHG | BODY MASS INDEX: 20.44 KG/M2 | DIASTOLIC BLOOD PRESSURE: 50 MMHG | HEIGHT: 69 IN | HEART RATE: 66 BPM | WEIGHT: 138 LBS

## 2024-12-31 DIAGNOSIS — F30.9 BIPOLAR I DISORDER, SINGLE MANIC EPISODE (H): ICD-10-CM

## 2024-12-31 DIAGNOSIS — K59.00 CONSTIPATION, UNSPECIFIED CONSTIPATION TYPE: ICD-10-CM

## 2024-12-31 DIAGNOSIS — G47.00 INSOMNIA, UNSPECIFIED TYPE: ICD-10-CM

## 2024-12-31 DIAGNOSIS — G30.9 ALZHEIMER'S DISEASE (H): Primary | ICD-10-CM

## 2024-12-31 DIAGNOSIS — G31.84 MCI (MILD COGNITIVE IMPAIRMENT): ICD-10-CM

## 2024-12-31 DIAGNOSIS — F02.80 ALZHEIMER'S DISEASE (H): Primary | ICD-10-CM

## 2024-12-31 DIAGNOSIS — G31.84 MCI (MILD COGNITIVE IMPAIRMENT): Primary | ICD-10-CM

## 2024-12-31 PROCEDURE — 99214 OFFICE O/P EST MOD 30 MIN: CPT | Mod: 95 | Performed by: FAMILY MEDICINE

## 2024-12-31 PROCEDURE — 99215 OFFICE O/P EST HI 40 MIN: CPT | Performed by: PSYCHIATRY & NEUROLOGY

## 2024-12-31 PROCEDURE — G2211 COMPLEX E/M VISIT ADD ON: HCPCS | Performed by: PSYCHIATRY & NEUROLOGY

## 2024-12-31 RX ORDER — DIVALPROEX SODIUM 250 MG/1
500 TABLET, DELAYED RELEASE ORAL 2 TIMES DAILY
Qty: 120 TABLET | Refills: 11 | Status: SHIPPED | OUTPATIENT
Start: 2024-12-31

## 2024-12-31 ASSESSMENT — ENCOUNTER SYMPTOMS: NERVOUS/ANXIOUS: 1

## 2024-12-31 NOTE — PROGRESS NOTES
"Patrick is a 89 year old who is being evaluated via a billable video visit.          Patrick was seen today for sleep concerns and anxiety.    Diagnoses and all orders for this visit:    MCI (mild cognitive impairment)    Bipolar I disorder, single manic episode (H)    Constipation, unspecified constipation type    Insomnia, unspecified type           Subjective   Patrick is a 89 year old, presenting for the following health issues:  sleep concerns (Are being addressed with neurologist.  ) and Anxiety (With BM - taking OTC stool softners - 2 dulcolax - at bedtime, then 1 dulcolax at 2pm if he has not had a BM)        12/31/2024     3:25 PM   Additional Questions   Roomed by      Video Start Time: 4:16 PM    History of Present Illness       Reason for visit:  Insomnia sometimes    He eats 0-1 servings of fruits and vegetables daily.He consumes 2 sweetened beverage(s) daily.He exercises with enough effort to increase his heart rate 9 or less minutes per day.  He exercises with enough effort to increase his heart rate 3 or less days per week.   He is taking medications regularly.       He has mild cognitive impairment/early dementia.  Also struggles with history of bipolar disorder.  Has insomnia and constipation.  Resides in a care facility.  Daughter is involved in care.    Saw neurologist today increased Depakote.  Remains on Exelon and mirtazapine.    Patient has fixations on bowel movements.  He is on a bowel regimen including MiraLAX twice daily combined with fiber supplement once daily.  Also reported to have use stimulant laxatives frequently.  He is fixated that he has to have a bowel movement every morning.  When asked how he feels he states \"I think I need to have a bowel movement\".  When I pointed out that that is what he thinks and I am asking how he feels he states \"I guess I feel all right\".  He denies pain or abdominal discomfort.  He expresses a lot of fear that he had significant constipation the past the " resulting in him having to have an enema.  He believes strongly if he does not have a bowel movement every day at a particular time that he will be severely constipated and have significant symptoms.    Because of a lot of these ongoing concerns we recently obtained an abdominal ultrasound which showed some nonspecific findings that I feel are probably related to ongoing overuse of laxatives as opposed to any other significant concern.  There is also some mention of scrotal swelling which has never been brought forth as the symptom complaint.  He was sent for scrotal ultrasound which also showed no significant findings.    We discussed the potential detrimental effects of overusing laxatives.  Based on the situation it seems we are more trying to make him happy by having a bowel movement every day at the time he feels he should and actually effectively treating constipation.  He did have an imaging test done recently which did not show any significant problem other than he does have a moderate stool burden throughout the colon.  I do believe there is some constipation but I do not believe he needs a significantly aggressive regimen to manage it.  There also seems to be some consideration that he does at times have diarrhea but this is uncertain.    When he saw the neurologist today the Depakote dose was increased.  This was in an effort per daughter's report to help with anxiety and agitation.    He expresses a lot of concern to me that he sometimes cannot get to sleep.  He expresses a lot of expectation that he would be able to take something on the spot to be able to sleep.  At some point somebody had made a recommendation that melatonin would be something to consider.  He obtained melatonin took the dose that was suggested on the package and then also decided upon himself to take additional doses in the middle of the night if he cannot sleep.  Despite doing this he reports ongoing difficulty with sleep.  I  discussed with him I think we should proceed with the medication changes recommended by his neurologist and then reevaluate.  I discouraged the use of melatonin beyond the recommended dosing and discussed the optimal dosage range for such medication.    I recommend they contact me if he does not have bowel movement for 2 days but I do not think he needs to have a bowel movement every day at 10 AM.  I do not recommend using stimulant laxatives.  If any symptoms develop that are concerning I recommend they contact.      Complete review of systems is obtained.  Other than the specific considerations noted above complete review of systems is negative.        Objective           Vitals:  No vitals were obtained today due to virtual visit.    Physical Exam   GENERAL: alert and no distress  EYES: Eyes grossly normal to inspection.  No discharge or erythema, or obvious scleral/conjunctival abnormalities.  RESP: No audible wheeze, cough, or visible cyanosis.    SKIN: Visible skin clear. No significant rash, abnormal pigmentation or lesions.  NEURO: Cranial nerves grossly intact.  Mentation and speech appropriate for age.  PSYCH: Appropriate affect, tone, and pace of words          Video-Visit Details    Type of service:  Video Visit   Video End Time:4:32 PM  Originating Location (pt. Location): Home    Distant Location (provider location):  On-site  Platform used for Video Visit: Kalyn  Signed Electronically by: Selvin Riley MD

## 2024-12-31 NOTE — LETTER
12/31/2024      Patrick Wharton  [address]  [place] [address]      Dear Colleague,    Thank you for referring your patient, Patrick Wharton, to the [facility] [place]. Please see a copy of my visit note below.    In person evaluation    HPI  January 1, 2020, in person consultation transfer of care from Dr. Vikas Mcginnis  4/27/2020, telephone visit  8/3/2020, telephone visit  11/6/2020, video visit  8/13/2021, in person visit  2/16/2022, in person visit  8/19/2022, in person visit  3/13/2023, in person visit  11/3/2023, in person visit  8/12/2024, in person visit  11/26/2024, in person visit  12/31/2024, in person visit        89-year-old followed neurologically for:  Cognitive decline (memory loss)  Onset as far back as 2015  Difficulty remembering people's names  Previously cared for by Dr. Vikas Mcginnis  History of bipolar disorder  Abnormal EEG      Since last seen November 2024  Multiple EMR interactions (7 since last visit)  Patient added on again emergently for reevaluation in regards to his dementia/bipolar disorder  Patient accompanied by family members    Patient has difficulty with rumination about his bowel movements  Patient has difficulty with being anxious and cannot get to sleep  Patient sometimes does not remember what bothers him but he writes everything down in a notebook  When I ask him what his biggest concern as he talks about his bowel movements which he is fixated on also    His care center has taken over his medications  He does use the melatonin himself        Mirtazapine 45 mg 1 p.o. nightly  Rivastigmine 3 mg 1 p.o. twice daily  Depakote 250 mg tablet 1 tab p.o. twice daily    Patient having a lot of difficulty with sleep  Uses the mirtazapine at 8-9 PM  Also is using melatonin  Assisted living has taken over doing his medications  Patient feels that since they took over his meds he is having a lot more trouble sleeping.    Family is wondering about  infusions for Alzheimer's disease.  We reviewed the actual MRI scan from 2016 showing atrophy  I am concerned that with his cognitive decline going on for quite some time and his age that the risks of these antiamyloid infusions are greater than the benefit that he would have  After discussion with the patient and his family members we all decided that we would hold off on that type of intervention.    Patient has had difficulty with memory troubles going on at least since 2015  Complicated by some bipolar disorder which makes it difficult to know how much is from a gene and his psych disease  Difficulty given exact staging of his memory disorder but he scores relatively okay on MoCA test in the past  I would put him in the mild to moderate range due to the combination of medical issues    Patient is chronically in the wheelchair  He can transfer from the bed to the wheelchair  He can transfer off the toilet which is a raised toilet seat okay                Emergency add-on visit 11/26/2024:  Emergency add-on due to increasing behavioral difficulties  [facility]  Patient with increasing confusion/anxiety, patient requiring increasing care needs.  Primary worked on getting patient into care setting.    Patient is revved up about a lot of issues  Has decreased hearing  Seems to fixate on a topic and gets anxious/agitated  Is sleeping better with the 45 mg of mirtazapine at night  I think we should also increase the Depakote though during the day  Does have a history of bipolar disorder    Lives at [facility] living  They take care of his meds  Gets a shower 2 times per week  He goes down for supper  Has some Ensure and some nuts in the morning  Has chronic decreased range of motion of his shoulders from past shoulder trouble        Patient on some mirtazapine to help with mood and sleep  Relative rapid increase in medication after going to care center from 30 mg to  45  Patient also has difficulty with bipolar disorder    Patient should be on for his dementia  Mirtazapine 45 mg 1 p.o. nightly  Exelon 3 mg p.o. twice daily  Depakote  250 mg tablet 1 tab p.o. twice daily (increased 11/26/2024)    November 2024 visit review:  ENT visit 12/12/2023 dizziness episodic/stable hearing loss on right side/Ménière's disease left side  PMD visit 2/5/2024 pressure ulcer left hip  NP visit 6/26/2024 (mobility difficulty/imbalance)  PMD visit 7/16/2024 physical deconditioning ordered PT/OT  No hospitalizations  No surgeries    MoCA  8/12/2024,    19 out of 30  Patient has been in the wheelchair more due to poor balance  Patient's wife passed away in May 2024 which is a major stressor    Lives in assisted living  Decreased mobility  Transferring from wheelchair to bed    Does have dizziness/vertigo  Has significant hearing loss left ear is very bad, right ear has a hearing aid and it kind of works  Patient does better when he can read lips     No hallucinations/no vivid dreams  Gets up once at nighttime to go to the bathroom  Uses walker/wheelchair  Sometimes if he moves too quick he will have a little bit of dizziness    No faintness  Has some difficulty with bradycardia  No diarrhea    Only eats 1 meal a day but that is his choice  No hallucinations no vivid dreams    Walks okay with the 4 wheeled walker with hand break  Family thought that maybe he fatigues with the 25-minute walk  Back when he had COVID a year before it kind of took a lot out of him but he rallied a bit      Mirtazapine 30 mg at nighttime to help with sleep prescribed by other physicians    Forgetfulness is about the same although 8 years have gone by, it has fallen off a bit.              A.  Mild cognitive impairment       Onset as far back as 2015       Trouble remembering people's names       Past abnormal EEG       Previously cared for by Dr. Vikas Mcginnis       Mother had some dementia the last year of life when she  lived to be 90       Scored a 23 out of 30, (8/13/2021)       Scored a 25 out of 30, (January 2020)       Subtle change over a year and a half         We discussed cognitive decline       Exelon 3 mg 1 tab p.o. twice daily      Depakote 250 mg 1 p.o. twice daily (started 11/26/2024)      Depakote  mg 2 p.o. twice daily, (increased 12/31/2024)          No GERD no diarrhea no lightheadedness no black tarry stools no blood in the stool      Does have severe hearing loss at baseline      B.  Has history of bipolar disorder        Neurologic review from original visit August 2021  Patient feels that he has had a falloff in his memory has more trouble tracking and following conversations  Used to go to a group with his retired friends from [employer] but they cannot get together due to Covid  Has severe hearing loss in both ears  Hearing doctor thought that they could maybe make a hearing aid that would work on the left ear but the not sure  With the mask on and talking to him he misses a lot of the conversation though  I am sure this is causing some difficulty with his memory recall and tracking conversations    Patient is still coherent can should chat about multiple topics  Can jump from topic to topic fairly easy  Does better when we talk about more things from the past  Scored a 23 out of 30, (8/13/2021)  Scored a 25 out of 30, (January 2020)  Subtle change over a year and a half        Neurologic summary:  Previously followed by Dr. Vikas Mcginnis  Mild cognitive impairment as far back as 2015  Patient works for [employer] as a   Retired in 1998  MoCA testing January 2020, 25 out of 30  Seems to have difficulty remembering people's names  Has significant hearing loss right greater than left left is not enough to be helped by hearing aid  Past history of bipolar disorder  Mother with dementia in the last year of her life at age 90  Uncle last year of life age 88 developed dementia  In the distant past of been on Depakote  for 2 to 3 years for bipolar disorder  Has an abnormal EEG  Patient restarted on Depakote   Talked about the pros and cons of using this to maybe stabilize the temporal lobe see if his symptoms are little less severe may be slow down the progression  His daughter has depression and is on Depakote    Patient tends to be quite active rows or exercises on the exercise bike regularly  Watches his diet hemoglobin A1c's have been good  Tries to stay active volunteers and participates in activities      Past medical history  Mild cognitive impairment  Bipolar disorder  Prostate cancer  Benign prostatic hypertrophy  Seborrheic dermatitis  Osteoarthritis with shoulder surgery  Left leg edema    Habits   Non-smoker  Does not drink alcohol  Worked as a 3M  retired in   Lives in seniors apartment with elevator  Does exercise regularly stays active      Family history  Mother had glaucoma congestive heart failure dementia  about age 90  Father  at 34 with testicular cancer  He has 1 sister with some diabetes  Daughter with depression treated with Depakote  Uncle with difficulty with memory at the age of 88 last year of his life      Work-up review  MRI scan brain 2016 mild to moderate atrophy progressed mildly from   Laboratory data 2019  Sodium 140 potassium 4.9 BUN 8 creatinine 0.8 glucose 94  White blood count 6.3 hemoglobin 13.2 platelets 313,000  Hemoglobin A1c 5.8%  Outside records from Dr. Mcginnis 2018, 2018 reviewed  EEG 2020 rare F7/T3 sharp waves with RAINE activity with hyperventilation mild to moderately abnormal  B12 689 ,TSH 1.57, (2020)    MoCA  2020,    25 out of 30   2021,   23 out of 30   2022,   22 out of 30  3/13/2023,    24 out of 30  2024,    19 out of 30    Laboratory data review                      2022          2023        3/2024          10/2024       2024  NA/K             138/4.6          136/4.2        137/4.8         135/4.2        134/4.5  BUN/Cr          3.7/0.73        6/0.82          21.4/0.74     14.2/0.76     7.7/0.74  GLU               91                  87              100               90                84  AST               30                                    28                                     20  WBC/HGB     5.7/13.2         5.3/13.3     11.3/13.3                           5.4/13.4  PLTs              226,000         227,000      205,000                            2035,000  HGBA1C        5.8                5.9                                                       5.9  B12                                                        718  TSH                                                       1.52                                   1.26          Exam    Review of system   Pertinent positives and negatives  Chronic hearing loss left much worse than right has a hearing aid on the right  Reads lips    No hallucinations  No vivid dreams    Dizziness periodically  No diplopia  No dysarthria  No dysphagia  No diarrhea  No tremor  No chest pain no shortness of breath  No headache      Ataxia uses the walker and cane  Discussed gait safety    Otherwise review systems negative    General exam  Blood pressure 96/50, pulse 66  Alert oriented x3  HEENT significant for hearing loss in left ear and right ear poor hearing and has a hearing aid  Lungs clear  Abdomen soft  No edema the feet  Patient fan only eats 1 meal per day goes down to the cafeteria with his wheelchair being pushed    Neurologic exam  Alert orient x3  Normal prosody speech  Normal naming  Normal comprehension  Normal repetition  No aphasia  No neglect  MoCA  3/13/2023,    24 out of 30  8/12/2024,    19 out of 30    Cranial 2 through 12 are significant for  Deaf in left ear significant hearing loss of the other  No ophthalmoplegia  No nystagmus  Face symmetrical  Tongue twisters okay  Visual fields intact    Upper extremities  No drift no tremor  normal finger-nose  Has chronic decreased range of motion of the shoulders right worse than left previous shoulder surgery    Lower extremities  Distal proximal strength good    Gait  Slow to get up pushing off with both hands on the wheelchair  Stands next to the wheelchair with some standby assistance slowly tries to march in place but has a little bit of retropulsion  Has flexed posture  High risk for falls mainly spends most of his time in the wheelchair but does transfer              Assessment/Plan     1.  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       Mild cognitive impairment      Onset as far back as 2015       Family history of dementia in the later years mom and uncle       History of bipolar disorder       Abnormal EEG F7/T3 sharp wave    8/12/2024,    19 out of 30           Depakote 250 mg 1 tablet twice daily (increased 11/26/2024)       Exelon 3 mg capsule twice daily (added 8/13/2021)    Tolerating medication    Discussed neurodegenerative disease and progression  Discussed as time goes on there is less neurologic reserve  With significant hearing loss this can affect his cognition also  Discussed that more meds may not necessarily make things better.  Discussed gait safety   Patient's wife passed away May 2024, stressor that can exacerbate symptoms      I think his psychiatric disease along with his age and previous cognitive difficulties have caught up with him at this time.  He used to be somewhat better when his wife was still living as I think she helped stabilize him cognitively      Patient has difficulty with rumination about his bowel movements  Patient has difficulty with being anxious and cannot get to sleep  Patient sometimes does not remember what bothers him but he writes everything down in a notebook  When I ask him what his biggest concern as he talks about his bowel movements which he is fixated on also    His care center has taken over  "his medications  He does use the melatonin himself        Family is wondering about infusions for Alzheimer's disease.  We reviewed the actual MRI scan from 2016 showing atrophy  I am concerned that with his cognitive decline going on for quite some time and his age that the risks of these antiamyloid infusions are greater than the benefit that he would have  After discussion with the patient and his family members we all decided that we would hold off on that type of intervention.    Patient has had difficulty with memory troubles going on at least since 2015  Complicated by some bipolar disorder which makes it difficult to know how much is from a gene and his psych disease  Difficulty given exact staging of his memory disorder but he scores relatively okay on MoCA test in the past  I would put him in the mild to moderate range due to the combination of medical issues    Patient is chronically in the wheelchair  He can transfer from the bed to the wheelchair  He can transfer off the toilet which is a raised toilet seat okay    Multiple issues discussed and reviewed on this emergency add-on visit  Will check labs in 3 months  Depakote level  Ammonia level  Electrolytes  AST  CBC with platelets    Follow-up in 4 months  Spend extended amount of time discussing with patient and family the role of medications to adjust symptoms but they cannot alleviate them completely  Discussed that as his condition and age progress symptom management may become more difficult  I will let the primary work on his difficulty with \"bowel movements\" which seemed of gotten worse since he has been wheelchair-bound    Total care time today 45 minutes  The longitudinal plan of care for the diagnosis(es)/condition(s) as documented were addressed during this visit. Due to the added complexity in care, I will continue to support Patrick in the subsequent management and with ongoing continuity of care.        As part of visit today  Reviewed " multiple interactions through [facility]  Reviewed laboratory data  Emergency add-on visit      Again, thank you for allowing me to participate in the care of your patient.        Sincerely,        chandler Rene MD    Electronically signed

## 2024-12-31 NOTE — NURSING NOTE
Chief Complaint   Patient presents with    Mild cognitive impairment     Pt is having more difficulty with sleep. Takes a mirtazapine at 8-9pm, and is also taking melatonin. His assisted living took over doing meds, since then, has had more trouble with sleep.  Family is also inquiring about infusions for alzheimers. Would like to discuss if this is an option for him?     Rolanda Lomeli, LPN on 12/31/2024 at 1:40 PM

## 2025-01-02 ENCOUNTER — PATIENT OUTREACH (OUTPATIENT)
Dept: CARE COORDINATION | Facility: CLINIC | Age: 89
End: 2025-01-02
Payer: COMMERCIAL

## 2025-01-02 NOTE — PROGRESS NOTES
Clinic Care Coordination Contact  Follow Up Progress Note      Assessment: Call from Jessica and she asked if we have the current HCD. Looked and the one in the chart has his wife listed who is . She will send current document to PCP through My Chart.      She has appt next week with [facility] to see if their services would be helpful.      Care Gaps:    Health Maintenance Due   Topic Date Due    RSV VACCINE (1 - 1-dose 75+ series) Never done    PHQ-2 (once per calendar year)  2025       Postponed to next pcp appt     Care Plans  Care Plan: General       Problem: Family will have the support and education to care for patient who has Alzheimer's Disease       Long-Range Goal: Family will have the support and education to care for patient who has Alzheimer's Disease       Start Date: 2024 Expected End Date: 2025    This Visit's Progress: 10%    Priority: High    Note:     Barriers: dementia  Strengths: lives in assisted living, has family support, has long term care insurance and assets.  Patient expressed understanding of goal: daughter Jessica does  Action steps to achieve this goal:  1. Jessica will contact the [employer] to learn about their services and use any that interest her and her siblings.  2. I will review geriatric  from [facility] and ANGELINE BROWN and utilize their supports if interested.   3. I will report progress towards this goal at scheduled outreach telephone calls from the CCC team.                                  Intervention/Education provided during outreach: support     Outreach Frequency: monthly, more frequently as needed      Plan:   [facility] SW will continue to monitor, support patient with current goals and will be available to assist as needs arise. [facility] CHW will reach out to patient on a monthly basis to discuss progression of goals.      [facility] SW will perform Chart Review in 45 days.

## 2025-01-06 ENCOUNTER — TELEPHONE (OUTPATIENT)
Dept: FAMILY MEDICINE | Facility: CLINIC | Age: 89
End: 2025-01-06
Payer: COMMERCIAL

## 2025-01-06 NOTE — TELEPHONE ENCOUNTER
I would recommend changing to a chewable vitamin or it is reasonable to forego the multivitamin altogether.    In terms of fluid and/or liquids going down the wrong way being slow and deliberate about eating and chewing food as well as drinking fluids is a good strategy, but if this is occurring often it might need some further testing.  How often are these types of symptoms occurring?      
S-(situation): Patients Daughter Vaishali ([facility] on file) called with concerns about the size of the vitamins that Elie is taking.     B-(background): Patient has been taking this vitamin for a while.     A-(assessment): Patient had complained that he feels that his Mens vitamin with minerals is too big and will sometimes have to cut them in half so he can swallow but even cut in half is too big.     Patient feels that when he swallows that big pill he gets that feeling like it is stuck in his throat but does not have any blockage in the throat, no difficulty breathing or swallowing other things.     Patient also stated that he is afraid to swallow too much fluid and have it go down the wrong side and get into his lungs so has to drink slowly.     R-(recommendations): Patient would like to see if Dr. Riley has any recommendations on another smaller vitamin to take and any comments on the concerns of getting fluid in his lungs.    Jesse Rodriguez RN  [facility]       
Spoke with la. Patria message also sent  
Chronic CHF (congestive heart failure)

## 2025-01-08 ENCOUNTER — TELEPHONE (OUTPATIENT)
Dept: FAMILY MEDICINE | Facility: CLINIC | Age: 89
End: 2025-01-08
Payer: COMMERCIAL

## 2025-01-08 NOTE — TELEPHONE ENCOUNTER
S-(situation): Patient and daughter calling about patient has been having some trouble swallowing for the past few weeks. Nothing new per the patient, he said he just needs to focus more while he is swallowing so that it doesn't go into his lungs.     B-(background):  Patient said if he drinks too fast that it can get in his lungs so he drinks slowly.     A-(assessment):  Per daughter she feels like patient is having sinus drainage but patient in the background said he is doing fine. Patient is not having any cold symptoms. And said his nose has been running more in the last couple of months.      R-(recommendations):  scheduled appointment with pcp for next week. I advised to call us back if symptoms worsen or want to be seen sooner.     Cha CHAWLA RN

## 2025-01-10 ENCOUNTER — TELEPHONE (OUTPATIENT)
Dept: FAMILY MEDICINE | Facility: CLINIC | Age: 89
End: 2025-01-10
Payer: COMMERCIAL

## 2025-01-10 NOTE — TELEPHONE ENCOUNTER
Forms/Letter Request    Type of form/letter: OTHER: MD Orders        Do we have the form/letter: Yes: On the provider's desk to be completed.    Who is the form from? Home care    Where did/will the form come from? form was faxed in    When is form/letter needed by: When it is completed by the provider.    How would you like the form/letter returned: Fax : [phone]    Patient Notified form requests are processed in 5-7 business days:Yes    Order details/form description: difficulty in walking, not else where classified dizziness and giddiness. Encounter for screening for malignant neoplasm of prostate

## 2025-01-14 ENCOUNTER — OFFICE VISIT (OUTPATIENT)
Dept: FAMILY MEDICINE | Facility: CLINIC | Age: 89
End: 2025-01-14
Payer: COMMERCIAL

## 2025-01-14 VITALS
HEART RATE: 75 BPM | WEIGHT: 138 LBS | RESPIRATION RATE: 18 BRPM | SYSTOLIC BLOOD PRESSURE: 118 MMHG | BODY MASS INDEX: 20.38 KG/M2 | OXYGEN SATURATION: 98 % | DIASTOLIC BLOOD PRESSURE: 64 MMHG

## 2025-01-14 DIAGNOSIS — R13.10 DYSPHAGIA, UNSPECIFIED TYPE: ICD-10-CM

## 2025-01-14 DIAGNOSIS — G47.00 INSOMNIA, UNSPECIFIED TYPE: ICD-10-CM

## 2025-01-14 DIAGNOSIS — K59.00 CONSTIPATION, UNSPECIFIED CONSTIPATION TYPE: Primary | ICD-10-CM

## 2025-01-14 DIAGNOSIS — G31.84 MCI (MILD COGNITIVE IMPAIRMENT): ICD-10-CM

## 2025-01-14 DIAGNOSIS — F30.9 BIPOLAR I DISORDER, SINGLE MANIC EPISODE (H): ICD-10-CM

## 2025-01-14 RX ORDER — BISACODYL 5 MG/1
5 TABLET, DELAYED RELEASE ORAL DAILY PRN
COMMUNITY

## 2025-01-14 NOTE — PROGRESS NOTES
Patrick Wharton  /65   Pulse 75   Resp 18   Wt 62.6 kg (138 lb)   SpO2 98%   BMI 20.38 kg/m       Assessment/Plan:                Patrick was seen today for recheck medication.    Diagnoses and all orders for this visit:    Constipation, unspecified constipation type    MCI (mild cognitive impairment)    Bipolar I disorder, single manic episode (H)    Insomnia, unspecified type    Dysphagia, unspecified type         DISCUSSION  See discussion below.  Ultimately provided reassurance no changes.  Subjective:     HPI:    Patrick Wharton is a 89 year old male is here today with his daughter Jessica and a nurse manager working with the family as an advocate.    He resides in an assisted living facility.  He sees neurology for help in managing his cognitive concerns and mental health concerns.    Today we talked about insomnia, constipation and 2 episodes of coughing/choking with eating and/or taking medication.    Constipation longstanding.  Patient more fixated on bowel movements and actually truly having constipation.  Reviewed his regiment.  Overall seems to be doing well expresses a lot of fear about not having bowel movements.  Elected to make no changes with anything I do not feel changes or further workup are warranted.  Recommend continued use of MiraLAX, fiber supplement.  Occasional use of Dulcolax is reasonable.  Recommend patient not have control over taking medications himself.    He coughed once while eating daughter very concerned because he described having a sensation that there was water in his lungs.  He does not have these episodes frequently.  He does not seem to have any other swallowing difficulties on asking him directly or talking to other people present.  We elected to continue to monitor the situation at this time.    He has insomnia.  This has been primarily managed through neurology who has prescribed mirtazapine and gave recommendation for dosing of melatonin.  Patient is taking additional  sleep supplements he is taking high doses of melatonin.  Is unclear if he really has difficulty being able to get to sleep or if he is more fearful that he will not sleep.  We had a very unproductive conversation about his belief that he needs to keep taking medication until he falls asleep early and will have significant issues.  I discussed I do not recommend taking melatonin beyond recommended doses and I recommend he stick with the 5 mg recommended dosage initially.  I do recommend he discuss sleep concerns more specifically with Dr. Waggoner.        ROS:  Complete review of systems is obtained.  Other than the specific considerations noted above complete review of systems is negative.    Objective:   Medications:  Current Outpatient Medications   Medication Sig Dispense Refill    amoxicillin (AMOXIL) 500 MG capsule Take 4 capsules (2000 mg) once prior to dental visit 4 capsule 1    bisacodyl (DULCOLAX) 5 MG EC tablet Take 5 mg by mouth daily as needed for constipation.      divalproex sodium delayed-release (DEPAKOTE) 250 MG DR tablet Take 2 tablets (500 mg) by mouth 2 times daily. 120 tablet 11    melatonin 5 MG tablet Take 13 mg by mouth nightly as needed for sleep.      mirtazapine (REMERON) 45 MG tablet Take 1 tablet (45 mg) by mouth at bedtime. 30 tablet 0    multivitamin w/minerals (THERA-VIT-M) tablet Take 1 tablet by mouth      polyethylene glycol (MIRALAX) 17 g packet Take 17 g by mouth daily. May take second dose daily if no BM for 2 days.  Hold if diarrhea develops until it resolves      polyethylene glycol-propylene glycol (LUBRICATING EYE DROPS) 0.4-0.3 % SOLN ophthalmic solution Placed 2 drops into each eye twice weekly scheduled, may administer daily as needed.      psyllium (METAMUCIL/KONSYL) 58.6 % powder Take by mouth daily. 1 scoop per day- Equaline Fiber      rivastigmine (EXELON) 3 MG capsule 1 CAPSULE ORALLY 2 TIMES DAILY 60 capsule 11     No current facility-administered medications for this  visit.      Allergies:   No Known Allergies     Social History     Socioeconomic History    Marital status:      Spouse name: Not on file    Number of children: Not on file    Years of education: Not on file    Highest education level: Not on file   Occupational History    Not on file   Tobacco Use    Smoking status: Never     Passive exposure: Past    Smokeless tobacco: Never   Vaping Use    Vaping status: Never Used   Substance and Sexual Activity    Alcohol use: No    Drug use: No    Sexual activity: Not on file   Other Topics Concern    Parent/sibling w/ CABG, MI or angioplasty before 65F 55M? No   Social History Narrative    Not on file     Social Drivers of Health     Financial Resource Strain: Low Risk  (12/21/2024)    Financial Resource Strain     Within the past 12 months, have you or your family members you live with been unable to get utilities (heat, electricity) when it was really needed?: No   Food Insecurity: Low Risk  (12/21/2024)    Food Insecurity     Within the past 12 months, did you worry that your food would run out before you got money to buy more?: No     Within the past 12 months, did the food you bought just not last and you didn t have money to get more?: No   Transportation Needs: Low Risk  (12/21/2024)    Transportation Needs     Within the past 12 months, has lack of transportation kept you from medical appointments, getting your medicines, non-medical meetings or appointments, work, or from getting things that you need?: No   Physical Activity: Inactive (12/21/2024)    Exercise Vital Sign     Days of Exercise per Week: 0 days     Minutes of Exercise per Session: 0 min   Stress: Stress Concern Present (12/21/2024)    [employer] - Occupational Stress Questionnaire     Feeling of Stress : Very much   Social Connections: Moderately Isolated (12/21/2024)    Social Connection and Isolation Panel [NHANES]     Frequency of Communication with Friends and  Family: More than three times a week     Frequency of Social Gatherings with Friends and Family: Three times a week     Attends Restorationist Services: 1 to 4 times per year     Active Member of Clubs or Organizations: No     Attends Club or Organization Meetings: Never     Marital Status:    Interpersonal Safety: Low Risk  (12/2/2024)    Interpersonal Safety     Do you feel physically and emotionally safe where you currently live?: Yes     Within the past 12 months, have you been hit, slapped, kicked or otherwise physically hurt by someone?: No     Within the past 12 months, have you been humiliated or emotionally abused in other ways by your partner or ex-partner?: No   Housing Stability: Low Risk  (12/21/2024)    Housing Stability     Do you have housing? : Yes     Are you worried about losing your housing?: No     Family History   Problem Relation Age of Onset    Heart Disease Mother     Alzheimer Disease Mother     Heart Failure Mother     Cancer Father       Most Recent Immunizations   Administered Date(s) Administered    COVID-19 12+ (MODERNA) 10/18/2024    COVID-19 12+ (Pfizer) 12/11/2023    COVID-19 Monovalent 18+ (Moderna) 01/14/2023    DT (PEDS <7y) 08/18/2005    Flu 65+ (Fluad) 10/02/2024    Flu, Unspecified 08/25/2020    Influenza (H1N1) 12/23/2009    Influenza (High Dose) Trivalent,PF (Fluzone) 09/17/2018    Influenza (IIV3) PF 10/14/2013    Influenza (prior to 2024) 10/07/2010    Influenza Vaccine 65+ (FLUAD) 10/03/2022    Influenza Vaccine 65+ (Fluzone HD) 09/29/2023    Influenza Vaccine >6 months,quad, PF 11/17/2014, 11/17/2014    Influenza Vaccine, 6+MO IM (QUADRIVALENT W/PRESERVATIVES) 10/14/2013    Influenza, Split Virus, Trivalent, Pf (Fluzone\Fluarix) 10/07/2010    Pneumo Conj 13-V (2010&after) 11/22/2016    Pneumococcal 23 valent 11/17/2014    TDAP (Adacel,Boostrix) 11/24/2017    Td (Adult), Adsorbed 08/18/2005    Td,adult,historic,unspecified 08/18/2005    Zoster recombinant adjuvanted  (SHINGRIX) 01/03/2020      Wt Readings from Last 3 Encounters:   01/14/25 62.6 kg (138 lb)   12/31/24 62.6 kg (138 lb)   12/02/24 62.6 kg (138 lb)      BP Readings from Last 6 Encounters:   01/14/25 118/64   12/31/24 96/50   12/02/24 120/64   11/26/24 97/55   11/19/24 103/66   11/13/24 129/86      Hemoglobin A1C   Date Value Ref Range Status   12/02/2024 5.9 (H) 0.0 - 5.6 % Final     Comment:     Normal <5.7%   Prediabetes 5.7-6.4%    Diabetes 6.5% or higher     Note: Adopted from ADA consensus guidelines.   12/11/2023 5.7 (H) 0.0 - 5.6 % Final     Comment:     Normal <5.7%   Prediabetes 5.7-6.4%    Diabetes 6.5% or higher     Note: Adopted from ADA consensus guidelines.   12/09/2022 5.8 (H) 0.0 - 5.6 % Final     Comment:     Normal <5.7%   Prediabetes 5.7-6.4%    Diabetes 6.5% or higher     Note: Adopted from ADA consensus guidelines.      PHYSICAL EXAM:    /65   Pulse 75   Resp 18   Wt 62.6 kg (138 lb)   SpO2 98%   BMI 20.38 kg/m       General Patient alert no signs of distress he seems to be less certain about some of the things he talks about today compared to other more recent visits.    HEENT no conjunctival irritation, no lesions are noted in the mouth or posterior pharynx neck is supple without any palpable lymph nodes masses or other problems.    His lungs are clear there is no wheeze crackles or other focal sounds    Heart is regular without any heart murmur.                          Answers submitted by the patient for this visit:  General Questionnaire (Submitted on 1/9/2025)  Chief Complaint: Chronic problems general questions HPI Form  How many days per week do you miss taking your medication?: 0  General Concern (Submitted on 1/9/2025)  Chief Complaint: Chronic problems general questions HPI Form  What is the reason for your visit today?: trouble swallowing sometimes  What are your symptoms?: trouble swallowing liquids.  How would you describe these symptoms?: Mild  Are your symptoms::  Staying the same  Have you had these symptoms before?: No  Is there anything that makes you feel worse?: sometimes liquids gets in my lungs unless I am careful.  Questionnaire about: Chronic problems general questions HPI Form (Submitted on 1/9/2025)  Chief Complaint: Chronic problems general questions HPI Form

## 2025-01-20 ENCOUNTER — MYC MEDICAL ADVICE (OUTPATIENT)
Dept: NEUROLOGY | Facility: CLINIC | Age: 89
End: 2025-01-20
Payer: COMMERCIAL

## 2025-01-20 DIAGNOSIS — F30.9 BIPOLAR I DISORDER, SINGLE MANIC EPISODE (H): ICD-10-CM

## 2025-01-20 DIAGNOSIS — F02.80 ALZHEIMER'S DISEASE (H): Primary | ICD-10-CM

## 2025-01-20 DIAGNOSIS — G30.9 ALZHEIMER'S DISEASE (H): Primary | ICD-10-CM

## 2025-01-21 ENCOUNTER — TELEPHONE (OUTPATIENT)
Dept: NEUROLOGY | Facility: CLINIC | Age: 89
End: 2025-01-21
Payer: COMMERCIAL

## 2025-01-21 NOTE — TELEPHONE ENCOUNTER
[facility] Call Center    Phone Message    May a detailed message be left on voicemail: yes     Reason for Call: Other:   Patients daughter, Vaishali (ctc on file) calling in. States that after Dr Rene reviews the [employer] message sent on 01/20/2025, if they can give Vaishali a call back to discuss next steps. Mentions that patient may answer the call, but may be confused on the situation. Please give Vaishali at call at [phone]    Action Taken: Message routed to:  Other: [facility]     Travel Screening: Not Applicable

## 2025-01-22 NOTE — TELEPHONE ENCOUNTER
"Patient with difficulty with sleep  Has been using melatonin 3 mg tablet multiple doses sometimes at night  Patient feels the mirtazapine does not seem to help much as it used to  Wants a different sleep aid.  Per chart mirtazapine 45 mg once per day.    Patient with complex neurodegenerative disease and is elderly at age 89  Patient has dementia/bipolar disorder    At the last visit 12/31/2024 and was placed on Depakote 250 mg tab 2 tab p.o. twice daily (increased 12/31/2024.)    We will send a Depakote level and ammonia level before deciding on further adjustments  I did try to contact Vaishali at the number below \"they were not able to take my call\".  [phone] Vaishali    Recommend getting blood test as ordered in the chart.      Next choice would be consideration of using Seroquel which has a black box warning with increased risks in the patient's age group and with his condition.  If we started this would have to start with very low-dose such as half of a 25 mg tablet at nighttime.  I think we should do the labs first    chandler Rene MD on 1/22/2025 at 4:58 PM    "

## 2025-01-22 NOTE — TELEPHONE ENCOUNTER
[facility] Call Center     Phone Message     May a detailed message be left on voicemail: yes      Reason for Call: Other:   Patients daughter, Vaishali (ctc on file) calling in. States that after Dr Rene reviews the [employer] message sent on 01/20/2025, if they can give Vaishali a call back to discuss next steps. Mentions that patient may answer the call, but may be confused on the situation. Please give Vaishali at call at [phone]     Action Taken: Message routed to:  Other: [facility]      Travel Screening: Not Applicable                                                                                                                                                                                                                                                                                                                                                                                                                                                                                                                                                                                                                                              THANG    1/21/25  2:05 PM  Vaishali Torres (Emergency Contact) contacted Nesha Bonilla

## 2025-01-23 NOTE — TELEPHONE ENCOUNTER
RN called patients daughter Vaishali. Discussed MD recommendation for labs, she is agreeable and will assist pt in having labs drawn over the weekend.     David LIMA RN, BSN  [facility]

## 2025-01-25 ENCOUNTER — LAB (OUTPATIENT)
Dept: LAB | Facility: HOSPITAL | Age: 89
End: 2025-01-25
Payer: COMMERCIAL

## 2025-01-25 DIAGNOSIS — G30.9 ALZHEIMER'S DISEASE (H): ICD-10-CM

## 2025-01-25 DIAGNOSIS — F30.9 BIPOLAR I DISORDER, SINGLE MANIC EPISODE (H): ICD-10-CM

## 2025-01-25 DIAGNOSIS — F02.80 ALZHEIMER'S DISEASE (H): ICD-10-CM

## 2025-01-25 LAB
AMMONIA PLAS-SCNC: 25 UMOL/L (ref 16–60)
AST SERPL W P-5'-P-CCNC: 27 U/L (ref 0–45)
VALPROATE SERPL-MCNC: 92.8 UG/ML

## 2025-01-25 PROCEDURE — 82140 ASSAY OF AMMONIA: CPT

## 2025-01-25 PROCEDURE — 84450 TRANSFERASE (AST) (SGOT): CPT

## 2025-01-25 PROCEDURE — 36415 COLL VENOUS BLD VENIPUNCTURE: CPT

## 2025-01-25 PROCEDURE — 80164 ASSAY DIPROPYLACETIC ACD TOT: CPT

## 2025-01-30 ENCOUNTER — DOCUMENTATION ONLY (OUTPATIENT)
Dept: OTHER | Facility: CLINIC | Age: 89
End: 2025-01-30
Payer: COMMERCIAL

## 2025-01-30 DIAGNOSIS — K59.00 CONSTIPATION, UNSPECIFIED CONSTIPATION TYPE: Primary | ICD-10-CM

## 2025-01-30 RX ORDER — PSYLLIUM HUSK (WITH SUGAR) 3 G/12 G
POWDER (GRAM) ORAL
Qty: 369 G | Refills: 5 | Status: SHIPPED | OUTPATIENT
Start: 2025-01-30

## 2025-01-31 NOTE — TELEPHONE ENCOUNTER
Per the note from family patient may be on excessive amounts of sleep medicines that he is controlling himself  Melatonin 3 mg using multiple doses.    Primal sleep medicine  This contains a combination of melatonin/valerian root/chamomile/lavender but also contains KORI which is a neurotransmitter type compound/L-tryptophan which is a neurotransmitter compound and it is not clear how these interact with his other meds.    1.  I would recommend that he stop the primal sleep medicine completely  2.  Maximum of 3 mg of melatonin at night  3.  Laboratory data below look okay  4.  Would prefer to wait before adding or changing any medications.  5.  We should not be making medication changes too quickly as we have not allowed time for anything to work  6.  The over-the-counter preparation primal could be interfering with the action of other medications and making it difficult to know if they could help.  Please let patient's daughter know the above.  Below is the information that is in the letter that was sent.      1/31/2025 letter  Laboratory data 1/25/2025  Depakote            92.8  AST                     27  NH3                    25       I think we should give the Depakote a little bit longer to see if it has some effect  I would not want to increase this any further  I did send a letter stating that other choices would be to try some Seroquel but in patients with dementia and elderly has increased risk for MI and death.  I think we are running out of options and we will have to weigh the risk versus benefit of these medications  He could check with his primary to see if they have other options to recommend.  Letter sent 1/31/2025.    chandler Rene MD on 1/31/2025 at 4:56 PM

## 2025-02-03 ENCOUNTER — TELEPHONE (OUTPATIENT)
Dept: NEUROLOGY | Facility: CLINIC | Age: 89
End: 2025-02-03
Payer: COMMERCIAL

## 2025-02-03 NOTE — TELEPHONE ENCOUNTER
Returned call and spoke to Vaishali. She is agreeable to provider recommendations and result note below. Patient has new PCP who visits him at living facility. Had appt 1/31, she prescribed trazodone and Escitalopram scheduled. Melatonin 5mg PRN if patient does not fall asleep/stay asleep with trazodone.     Per Vaishali patient tolerated trazodone well last night and did not need any additional medications. She reports that patient is reluctant to discontinue primal sleep supplement, RN will send mychart to patient and Vaishali will review recommendations with him further.     She denies any additional questions or concerns at this time. Patient to follow up in May as scheduled.     David LIMA RN, BSN  [facility]

## 2025-02-03 NOTE — TELEPHONE ENCOUNTER
Duplicate encounter, will address in mychart encounter.     David LIMA RN, BSN  [facility]

## 2025-02-03 NOTE — TELEPHONE ENCOUNTER
RN attempted call to Vaishali, phone was picked up by a family member named Patrick. Asked that she return call to clinic when able regarding a non-urgent matter. Provided clinic number for call back.     David LIMA RN, BSN  [facility]

## 2025-02-03 NOTE — TELEPHONE ENCOUNTER
[facility] Call Center     Phone Message     May a detailed message be left on voicemail: yes      Reason for Call: Other:  Patients daughter, Vaishali states that she missed a call from the care team and was returning the call. Writer unable to see any recent calls or messages from the care team. Please give Vaishali at call at [phone]     Action Taken: Message routed to:  Other: [facility]      Travel Screening: Not Applicable

## 2025-02-10 ENCOUNTER — PATIENT OUTREACH (OUTPATIENT)
Dept: CARE COORDINATION | Facility: CLINIC | Age: 89
End: 2025-02-10
Payer: COMMERCIAL

## 2025-02-10 NOTE — PROGRESS NOTES
Clinic Care Coordination Contact  Follow Up Progress Note      Assessment: call from latha Lopez.  She is trying to get medical records from PCP and neurology to provide to long term care insurance which is needed by March.  She has printed off things from My Chart but that did not satisfy the long term care insurance folks.  Discussed completing the form and sending to Medical Records.  Sent the link to her email at [email or web address]    Writer communicated the risks of unencrypted electronic communication and the patient and/or patient representative has agreed to accept the risks and receive unencrypted communication related to the information or resources we have discussed. We reviewed that no PHI will be included.  Email address verified with the patient.  Will include electronic communication form for patient/caregiver to complete.    Patient is declining and she learned today that he may need to move to the [facility].  He continues to lose weight.  Reviewed hospice benefits again.      After call, did chart review, and looks like patient is now getting primary care through [facility].      Care Gaps:    Health Maintenance Due   Topic Date Due    RSV VACCINE (1 - 1-dose 75+ series) Never done    PHQ-2 (once per calendar year)  01/01/2025       Declined due to not interested.      Care Plans  Care Plan: General       Problem: Family will have the support and education to care for patient who has Alzheimer's Disease       Long-Range Goal: Family will have the support and education to care for patient who has Alzheimer's Disease       Start Date: 12/24/2024 Expected End Date: 12/23/2025    This Visit's Progress: 40% Recent Progress: 20%    Priority: High    Note:     Barriers: dementia  Strengths: lives in assisted living, has family support, has long term care insurance and assets.  Patient expressed understanding of goal: latha Lopez does  Action steps to achieve this  goal:  1. Jessica will contact the [employer] to learn about their services and use any that interest her and her siblings.  2. I will review geriatric  from [facility] and ANGELINE [facility]OWN and utilize their supports if interested.   3. I will report progress towards this goal at scheduled outreach telephone calls from the CCC team.                                  Intervention/Education provided during outreach: help with getting medical records.     Outreach Frequency: monthly, more frequently as needed    Plan: Care Coordinator will follow up in two weeks.  Will close if daughter confirms that he is getting primary care at [facility] or has moved to long term care.  Cookie Syed,   [facility]  [phone]

## 2025-02-12 DIAGNOSIS — K59.00 CONSTIPATION, UNSPECIFIED CONSTIPATION TYPE: Primary | ICD-10-CM

## 2025-02-12 RX ORDER — POLYETHYLENE GLYCOL 3350 17 G/17G
POWDER, FOR SOLUTION ORAL
Qty: 1020 G | Refills: 2 | Status: SHIPPED | OUTPATIENT
Start: 2025-02-12

## 2025-02-17 PROBLEM — E78.5 HYPERLIPIDEMIA, UNSPECIFIED: Status: ACTIVE | Noted: 2024-10-17

## 2025-02-18 ENCOUNTER — VIRTUAL VISIT (OUTPATIENT)
Dept: FAMILY MEDICINE | Facility: CLINIC | Age: 89
End: 2025-02-18
Payer: COMMERCIAL

## 2025-02-18 DIAGNOSIS — R41.89 MODERATE COGNITIVE IMPAIRMENT: Primary | ICD-10-CM

## 2025-02-18 PROCEDURE — 98013 SYNCH AUDIO-ONLY EST LOW 20: CPT | Performed by: FAMILY MEDICINE

## 2025-02-18 NOTE — PROGRESS NOTES
Patrick is a 89 year old who is being evaluated via a billable telephone visit.    What phone number would you like to be contacted at? [phone]   How would you like to obtain your AVS? Alonso  Originating Location (pt. Location): Home    Distant Location (provider location):  On-site    Complete Form for Services.    Diagnoses and all orders for this visit:    Moderate cognitive impairment           Subjective   Patrick is a 89 year old, presenting for the following health issues:  No chief complaint on file.    HPI     Purpose of this encounter was to complete documentation for insurance regarding care with his evolving dementia process.  He has a diagnosis of mild cognitive impairment has been seen by neurology.  Also has underlying history of bipolar disorder.  He experiences insomnia and constipation.  In recent months his cognitive impairment has progressed significantly.  Today we reviewed considerations regarding the level of care that he needs.  He has in recent months been taking over-the-counter medications to manage symptoms including insomnia and constipation in inappropriate ways.  He has enough knowledge to be able to order medications and take them.  He takes them in a way that is not appropriate for the recommended dosing and could become detrimental.  This has been discussed with him necessitating that he have his medications managed solely by his care staff.  He is having additional difficulties with ambulation, bathing and dressing at this time.  He has been followed closely by neurology as well as primary care.  He is in the process of changing over into a higher level of care at the residence where he lives.  He has a care provider who is present in the facility who is able to see him on a regular basis who has started the process of managing medical concerns.  I discussed how I fully support this transition.  We completed the documentation today with him and his daughter present.      Complete  review of systems is obtained.  Other than the specific considerations noted above complete review of systems is negative.        Objective           Vitals:  No vitals were obtained today due to virtual visit.    Physical Exam   General: Alert and no distress //Respiratory: No audible wheeze, cough, or shortness of breath // Psychiatric:  Appropriate affect, tone, and pace of words             Phone call duration: 12 minutes  Signed Electronically by: Selvin Riley MD, MD

## 2025-02-20 ENCOUNTER — PATIENT OUTREACH (OUTPATIENT)
Dept: CARE COORDINATION | Facility: CLINIC | Age: 89
End: 2025-02-20
Payer: COMMERCIAL

## 2025-02-20 NOTE — PROGRESS NOTES
Clinic Care Coordination Contact  Follow Up Progress Note      Assessment: Pc to patient daughter for follow up. Patient noted her father will be transferred to LTC in next 10 days. Daughter noted she want to continue to see PCP at [facility] for now. She noted he will likely receive primary care through LTC but she doesn't want his case close yet.   Care Gaps:    Health Maintenance Due   Topic Date Due    RSV VACCINE (1 - 1-dose 75+ series) Never done    LIPID  12/11/2024    PHQ-2 (once per calendar year)  01/01/2025       Currently there are no Care Gaps.    Care Plans  Care Plan: General       Problem: Family will have the support and education to care for patient who has Alzheimer's Disease       Long-Range Goal: Family will have the support and education to care for patient who has Alzheimer's Disease       Start Date: 12/24/2024 Expected End Date: 12/23/2025    This Visit's Progress: 40% Recent Progress: 40%    Priority: High    Note:     Barriers: dementia  Strengths: lives in assisted living, has family support, has long term care insurance and assets.  Patient expressed understanding of goal: latha Lopez does  Action steps to achieve this goal:  1. Jessica will contact the [employer] to learn about their services and use any that interest her and her siblings.  2. I will review geriatric  from [facility] and [place] and utilize their supports if interested.   3. I will report progress towards this goal at scheduled outreach telephone calls from the CCC team.                                  Intervention/Education provided during outreach: Pt reports understanding and denies any additional questions or concerns at this times. SW CC engaged in [facility] communication during encounter.    Plan: Patient daughter will let us know upon primary care transfer then CC will close case.     Care Coordinator will follow up in monthly     ERIC Dalal  Social Work Care Cooridinator   [facility]   Phone: [phone]

## 2025-04-13 ENCOUNTER — HEALTH MAINTENANCE LETTER (OUTPATIENT)
Age: 89
End: 2025-04-13

## 2025-04-29 ENCOUNTER — LAB REQUISITION (OUTPATIENT)
Dept: LAB | Facility: CLINIC | Age: 89
End: 2025-04-29
Payer: COMMERCIAL

## 2025-04-29 DIAGNOSIS — R53.83 OTHER FATIGUE: ICD-10-CM

## 2025-04-30 LAB
ERYTHROCYTE [DISTWIDTH] IN BLOOD BY AUTOMATED COUNT: 14.5 % (ref 10–15)
HCT VFR BLD AUTO: 34 % (ref 40–53)
HGB BLD-MCNC: 11.2 G/DL (ref 13.3–17.7)
MCH RBC QN AUTO: 34.4 PG (ref 26.5–33)
MCHC RBC AUTO-ENTMCNC: 32.9 G/DL (ref 31.5–36.5)
MCV RBC AUTO: 104 FL (ref 78–100)
PLATELET # BLD AUTO: 103 10E3/UL (ref 150–450)
RBC # BLD AUTO: 3.26 10E6/UL (ref 4.4–5.9)
VALPROATE SERPL-MCNC: 89.3 UG/ML
VANCOMYCIN SERPL-MCNC: <4 UG/ML
WBC # BLD AUTO: 3.8 10E3/UL (ref 4–11)

## 2025-04-30 PROCEDURE — 80164 ASSAY DIPROPYLACETIC ACD TOT: CPT | Mod: ORL | Performed by: NURSE PRACTITIONER

## 2025-04-30 PROCEDURE — 80202 ASSAY OF VANCOMYCIN: CPT | Mod: ORL | Performed by: NURSE PRACTITIONER

## 2025-04-30 PROCEDURE — P9604 ONE-WAY ALLOW PRORATED TRIP: HCPCS | Mod: ORL | Performed by: NURSE PRACTITIONER

## 2025-04-30 PROCEDURE — 36415 COLL VENOUS BLD VENIPUNCTURE: CPT | Mod: ORL | Performed by: NURSE PRACTITIONER

## 2025-04-30 PROCEDURE — 85027 COMPLETE CBC AUTOMATED: CPT | Mod: ORL | Performed by: NURSE PRACTITIONER

## 2025-05-01 PROCEDURE — 81001 URINALYSIS AUTO W/SCOPE: CPT | Mod: ORL | Performed by: NURSE PRACTITIONER

## 2025-05-02 ENCOUNTER — LAB REQUISITION (OUTPATIENT)
Dept: LAB | Facility: CLINIC | Age: 89
End: 2025-05-02
Payer: COMMERCIAL

## 2025-05-02 DIAGNOSIS — D72.819 DECREASED WHITE BLOOD CELL COUNT, UNSPECIFIED: ICD-10-CM

## 2025-05-02 DIAGNOSIS — E86.0 DEHYDRATION: ICD-10-CM

## 2025-05-02 LAB
ALBUMIN UR-MCNC: 30 MG/DL
APPEARANCE UR: ABNORMAL
BACTERIA #/AREA URNS HPF: ABNORMAL /HPF
BILIRUB UR QL STRIP: NEGATIVE
CAOX CRY #/AREA URNS HPF: ABNORMAL /HPF
COLOR UR AUTO: YELLOW
GLUCOSE UR STRIP-MCNC: NEGATIVE MG/DL
HGB UR QL STRIP: NEGATIVE
KETONES UR STRIP-MCNC: ABNORMAL MG/DL
LEUKOCYTE ESTERASE UR QL STRIP: NEGATIVE
MUCOUS THREADS #/AREA URNS LPF: PRESENT /LPF
NITRATE UR QL: NEGATIVE
PH UR STRIP: 6.5 [PH] (ref 5–7)
RBC URINE: 15 /HPF
SP GR UR STRIP: 1.03 (ref 1–1.03)
SQUAMOUS EPITHELIAL: 1 /HPF
TRANSITIONAL EPI: <1 /HPF
UROBILINOGEN UR STRIP-MCNC: 12 MG/DL
WBC URINE: 2 /HPF

## 2025-05-06 LAB
ANION GAP SERPL CALCULATED.3IONS-SCNC: 9 MMOL/L (ref 7–15)
BUN SERPL-MCNC: 24.5 MG/DL (ref 8–23)
CALCIUM SERPL-MCNC: 9.1 MG/DL (ref 8.8–10.4)
CHLORIDE SERPL-SCNC: 100 MMOL/L (ref 98–107)
CREAT SERPL-MCNC: 0.81 MG/DL (ref 0.67–1.17)
EGFRCR SERPLBLD CKD-EPI 2021: 85 ML/MIN/1.73M2
ERYTHROCYTE [DISTWIDTH] IN BLOOD BY AUTOMATED COUNT: 14.2 % (ref 10–15)
GLUCOSE SERPL-MCNC: 84 MG/DL (ref 70–99)
HCO3 SERPL-SCNC: 28 MMOL/L (ref 22–29)
HCT VFR BLD AUTO: 33.7 % (ref 40–53)
HGB BLD-MCNC: 11.1 G/DL (ref 13.3–17.7)
MCH RBC QN AUTO: 35 PG (ref 26.5–33)
MCHC RBC AUTO-ENTMCNC: 32.9 G/DL (ref 31.5–36.5)
MCV RBC AUTO: 106 FL (ref 78–100)
PLATELET # BLD AUTO: 117 10E3/UL (ref 150–450)
POTASSIUM SERPL-SCNC: 4.1 MMOL/L (ref 3.4–5.3)
RBC # BLD AUTO: 3.17 10E6/UL (ref 4.4–5.9)
SODIUM SERPL-SCNC: 137 MMOL/L (ref 135–145)
WBC # BLD AUTO: 5.1 10E3/UL (ref 4–11)

## 2025-05-06 PROCEDURE — 80048 BASIC METABOLIC PNL TOTAL CA: CPT | Mod: ORL | Performed by: NURSE PRACTITIONER

## 2025-05-06 PROCEDURE — 36415 COLL VENOUS BLD VENIPUNCTURE: CPT | Mod: ORL | Performed by: NURSE PRACTITIONER

## 2025-05-06 PROCEDURE — 85027 COMPLETE CBC AUTOMATED: CPT | Mod: ORL | Performed by: NURSE PRACTITIONER

## 2025-05-06 PROCEDURE — P9603 ONE-WAY ALLOW PRORATED MILES: HCPCS | Mod: ORL | Performed by: NURSE PRACTITIONER

## 2025-05-13 NOTE — PROGRESS NOTES
In person evaluation    HPI  January 1, 2020, in person consultation transfer of care from Dr. Vikas Mcginnis  4/27/2020, telephone visit  8/3/2020, telephone visit  11/6/2020, video visit  8/13/2021, in person visit  2/16/2022, in person visit  8/19/2022, in person visit  3/13/2023, in person visit  11/3/2023, in person visit  8/12/2024, in person visit  11/26/2024, in person visit  12/31/2024, in person visit  5/14/2025, in person visit          89-year-old followed neurologically for:  Cognitive decline (memory loss)  Onset as far back as 2015  Difficulty remembering people's names  Previously cared for by Dr. Vikas Mcginnis  History of bipolar disorder  Abnormal EEG      May 14, 2025 visit  Patient now lives at [facility].  He is not as anxious  Family feels he is doing much better    At the care center they do all of his cares  Dr. Paul Bloomberg helps care for him at [facility]  They take care of his meds and this is really helped  Cleaning is taking care of him  They wean him down to the dining room in the wheelchair  He feels the food is okay and does get Ensure for some supplement  They help him with dressing he has very poor shoulders  They transfer him from the bed to the wheelchair with the EZ stand  He gets help with toilet and bath    Does have a hearing aids in but he can hear me when were talking    He reads the newspaper in the morning  He likes watching TV in the [employer] news our  He likes sports  They sometimes do a little bit of bingo  Family is happy with the new living situation  With his meds controlled he seems to be much more stable    Mirtazapine 15 mg at nighttime  Lexapro 20 mg daily  Trazodone 100 mg nightly  Melatonin 10 mg at nighttime  Exelon 3 mg twice daily  Depakote 250 mg 2 tabs p.o. twice daily    Summary since last seen  Laboratory data 1/25/2025  Depakote            92.8  AST                     27 NH3 25       I think we should give the Depakote a little  bit longer to see if it has some effect  I would not want to increase this any further  I did send a letter stating that other choices would be to try some Seroquel but in patients with dementia and elderly has increased risk for MI and death.  I think we are running out of options and we will have to weigh the risk versus benefit of these medications  He could check with his primary to see if they have other options to recommend.    Past visit reviews:  Telephone visit 2/20/2025  Filled out form about patient's disability  Patient previously had mild cognitive impairment  Now has Alzheimer's switch diagnosis 8/12/2024  Has at least moderate cognitive impairment mainly because of poor discretion and inability to care for self.  Also has physical difficulty and needs help with  Activities of daily living such as dressing, getting into the shower with a wheeled shower chair, assistance getting in and out of bed  Needs help with making sure that he eats properly due to his poor discretion  He should no longer be in control of any of his meds and they should be monitored and controlled by others  Increasing agitation and poor discretion complicating his cognitive decline.    Patient did a visit with primary team 2/18/2025  As both Alzheimer's dementia/bipolar disorder.  Previously patient had been managing insomnia and constipation in inappropriate way as he over-the-counter medications.  This made management of his other symptoms difficult.  He needed increased seen higher level of care at the residency where he lives.            December 2024 visit review:  Multiple EMR interactions (7 since last visit)  Patient added on again emergently for reevaluation in regards to his dementia/bipolar disorder  Patient accompanied by family members    Patient has difficulty with rumination about his bowel movements  Patient has difficulty with being anxious and cannot get to sleep  Patient sometimes does not remember what bothers  him but he writes everything down in a notebook  When I ask him what his biggest concern as he talks about his bowel movements which he is fixated on also    His care center has taken over his medications  He does use the melatonin himself        Mirtazapine 45 mg 1 p.o. nightly  Rivastigmine 3 mg 1 p.o. twice daily  Depakote 250 mg tablet 1 tab p.o. twice daily    Patient having a lot of difficulty with sleep  Uses the mirtazapine at 8-9 PM  Also is using melatonin  Assisted living has taken over doing his medications  Patient feels that since they took over his meds he is having a lot more trouble sleeping.    Family is wondering about infusions for Alzheimer's disease.  We reviewed the actual MRI scan from 2016 showing atrophy  I am concerned that with his cognitive decline going on for quite some time and his age that the risks of these antiamyloid infusions are greater than the benefit that he would have  After discussion with the patient and his family members we all decided that we would hold off on that type of intervention.    Patient has had difficulty with memory troubles going on at least since 2015  Complicated by some bipolar disorder which makes it difficult to know how much is from a gene and his psych disease  Difficulty given exact staging of his memory disorder but he scores relatively okay on MoCA test in the past  I would put him in the mild to moderate range due to the combination of medical issues    Patient is chronically in the wheelchair  He can transfer from the bed to the wheelchair  He can transfer off the toilet which is a raised toilet seat okay                Emergency add-on visit 11/26/2024:  Emergency add-on due to increasing behavioral difficulties  [facility]  Patient with increasing confusion/anxiety, patient requiring increasing care needs.  Primary worked on getting patient into care setting.    Patient is revved up about a lot of issues  Has  decreased hearing  Seems to fixate on a topic and gets anxious/agitated  Is sleeping better with the 45 mg of mirtazapine at night  I think we should also increase the Depakote though during the day  Does have a history of bipolar disorder    Lives at [facility]  They take care of his meds  Gets a shower 2 times per week  He goes down for supper  Has some Ensure and some nuts in the morning  Has chronic decreased range of motion of his shoulders from past shoulder trouble        Patient on some mirtazapine to help with mood and sleep  Relative rapid increase in medication after going to [facility] [place] from 30 mg to 45  Patient also has difficulty with bipolar disorder    Patient should be on for his dementia  Mirtazapine 45 mg 1 p.o. nightly  Exelon 3 mg p.o. twice daily  Depakote  250 mg tablet 1 tab p.o. twice daily (increased 11/26/2024)    November 2024 visit review:  ENT visit 12/12/2023 dizziness episodic/stable hearing loss on right side/Ménière's disease left side  PMD visit 2/5/2024 pressure ulcer left hip  NP visit 6/26/2024 (mobility difficulty/imbalance)  PMD visit 7/16/2024 physical deconditioning ordered PT/OT  No hospitalizations  No surgeries    MoCA  8/12/2024,    19 out of 30  Patient has been in the wheelchair more due to poor balance  Patient's wife passed away in May 2024 which is a major stressor    Lives in assisted living  Decreased mobility  Transferring from wheelchair to bed    Does have dizziness/vertigo  Has significant hearing loss left ear is very bad, right ear has a hearing aid and it kind of works  Patient does better when he can read lips     No hallucinations/no vivid dreams  Gets up once at nighttime to go to the bathroom  Uses walker/wheelchair  Sometimes if he moves too quick he will have a little bit of dizziness    No faintness  Has some difficulty with bradycardia  No diarrhea    Only eats 1 meal a day but that is his choice  No hallucinations no vivid  dreams    Walks okay with the 4 wheeled walker with hand break  Family thought that maybe he fatigues with the 25-minute walk  Back when he had COVID a year before it kind of took a lot out of him but he rallied a bit      Mirtazapine 30 mg at nighttime to help with sleep prescribed by other physicians    Forgetfulness is about the same although 8 years have gone by, it has fallen off a bit.              A.  Mild cognitive impairment       Onset as far back as 2015       Trouble remembering people's names       Past abnormal EEG       Previously cared for by Dr. Vikas Mcginnis       Mother had some dementia the last year of life when she lived to be 90       Scored a 23 out of 30, (8/13/2021)       Scored a 25 out of 30, (January 2020)       Subtle change over a year and a half         We discussed cognitive decline       Exelon 3 mg 1 tab p.o. twice daily      Depakote 250 mg 1 p.o. twice daily (started 11/26/2024)      Depakote  mg 2 p.o. twice daily, (increased 12/31/2024)          No GERD no diarrhea no lightheadedness no black tarry stools no blood in the stool      Does have severe hearing loss at baseline      B.  Has history of bipolar disorder        Neurologic review from original visit August 2021  Patient feels that he has had a falloff in his memory has more trouble tracking and following conversations  Used to go to a group with his retired friends from [employer] but they cannot get together due to Covid  Has severe hearing loss in both ears  Hearing doctor thought that they could maybe make a hearing aid that would work on the left ear but the not sure  With the mask on and talking to him he misses a lot of the conversation though  I am sure this is causing some difficulty with his memory recall and tracking conversations    Patient is still coherent can should chat about multiple topics  Can jump from topic to topic fairly easy  Does better when we talk about more things from the past  Scored a 23 out of  30, (2021)  Scored a 25 out of 30, (2020)  Subtle change over a year and a half        Neurologic summary:  Previously followed by Dr. Vikas Mcginnis  Mild cognitive impairment as far back as   Patient works for [employer] as a   Retired in   MoCA testing 2020, 25 out of 30  Seems to have difficulty remembering people's names  Has significant hearing loss right greater than left left is not enough to be helped by hearing aid  Past history of bipolar disorder  Mother with dementia in the last year of her life at age 90  Uncle last year of life age 88 developed dementia  In the distant past of been on Depakote for 2 to 3 years for bipolar disorder  Has an abnormal EEG  Patient restarted on Depakote   Talked about the pros and cons of using this to maybe stabilize the temporal lobe see if his symptoms are little less severe may be slow down the progression  His daughter has depression and is on Depakote    Patient tends to be quite active rows or exercises on the exercise bike regularly  Watches his diet hemoglobin A1c's have been good  Tries to stay active volunteers and participates in activities      Past medical history  Mild cognitive impairment  Bipolar disorder  Prostate cancer  Benign prostatic hypertrophy  Seborrheic dermatitis  Osteoarthritis with shoulder surgery  Left leg edema    Habits   Non-smoker  Does not drink alcohol  Worked as a [employer]  retired in   Lives in seniors apartment with elevator  Does exercise regularly stays active      Family history  Mother had glaucoma congestive heart failure dementia  about age 90  Father  at 34 with testicular cancer  He has 1 sister with some diabetes  Daughter with depression treated with Depakote  Uncle with difficulty with memory at the age of 88 last year of his life      Work-up review  MRI scan brain 2016 mild to moderate atrophy progressed mildly from   Laboratory data 2019  Sodium 140  potassium 4.9 BUN 8 creatinine 0.8 glucose 94  White blood count 6.3 hemoglobin 13.2 platelets 313,000  Hemoglobin A1c 5.8%  Outside records from Dr. Mcginnis January 2018, January 2018 reviewed  EEG January 21, 2020 rare F7/T3 sharp waves with RAINE activity with hyperventilation mild to moderately abnormal  B12 689 ,TSH 1.57, (1/2020)    MoCA  1/20/2020,    25 out of 30   8/13/2021,   23 out of 30   8/19/2022,   22 out of 30  3/13/2023,    24 out of 30  8/12/2024,    19 out of 30    Laboratory data review                      12/2022          5/2023        3/2024          10/2024       12/2024    1/25/2025     4/2025      5/2025  Depakote                                                                                                         92.8              89.3  NH3                                                                                                                  25  NA/K             138/4.6          136/4.2       137/4.8         135/4.2        134/4.5                                          137/4.1  BUN/Cr          3.7/0.73        6/0.82          21.4/0.74     14.2/0.76     7.7/0.74                                         24.5/0.81  GLU               91                  87              100               90                84                                                   84  AST               30                                    28                                     20               27  WBC/HGB     5.7/13.2         5.3/13.3     11.3/13.3                           5.4/13.4                                        5.1/11.1  PLTs              226,000         227,000      205,000                            235,000                                        117,000  HGBA1C        5.8                5.9                                                       5.9  B12                                                        718  TSH                                                       1.52                                    1.26            Exam    Review of system   Pertinent positives and negatives  Chronic hearing loss left much worse than right has a hearing aid on the right  Reads lips    No hallucinations  No vivid dreams    Dizziness periodically  No diplopia  No dysarthria  No dysphagia  No diarrhea  No tremor  No chest pain no shortness of breath  No headache    Transfers with EZ stand  Mainly gets around in the wheelchair    Otherwise review systems negative    General exam  Blood pressure 83/54, pulse 63  Alert oriented x3  HEENT significant for hearing loss in left ear and right ear poor hearing and has a hearing aid  Lungs clear  Abdomen soft  No edema the feet      Neurologic exam  Alert orient x3  Normal prosody speech  Normal naming  Normal comprehension  Normal repetition  No aphasia  No neglect  MoCA  3/13/2023,    24 out of 30  8/12/2024,    19 out of 30    Cranial 2 through 12 are significant for  Deaf in left ear significant hearing loss of the other  No ophthalmoplegia  No nystagmus  Face symmetrical  Tongue twisters okay  Visual fields intact    Upper extremities  No drift no tremor normal finger-nose  Has chronic decreased range of motion of the shoulders right worse than left previous shoulder surgery    Lower extremities  Distal proximal strength good    Gait  Now gets around mostly in the wheelchair  Transferred from the bed to the wheelchair with the EZ stand  Significant chronic lower extremity weakness with retropulsion  Very flexed posture                Assessment/Plan     1.  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       Mild cognitive impairment      Onset as far back as 2015       Family history of dementia in the later years mom and uncle       History of bipolar disorder       Abnormal EEG F7/T3 sharp wave    8/12/2024,    19 out of 30           Depakote 250 mg 1 tablet twice daily (increased 11/26/2024)       Exelon 3 mg capsule twice daily  (added 8/13/2021)    Tolerating medication    Discussed neurodegenerative disease and progression  Discussed as time goes on there is less neurologic reserve  With significant hearing loss this can affect his cognition also  Discussed that more meds may not necessarily make things better.  Discussed gait safety   Patient's wife passed away May 2024, stressor that can exacerbate symptoms      I think his psychiatric disease along with his age and previous cognitive difficulties have caught up with him at this time.  He used to be somewhat better when his wife was still living as I think she helped stabilize him cognitively    Patient now living in the [facility] center  Has a Alzheimer's type degenerative disease  Exacerbated by bipolar disorder  Has poor hearing  Previously very difficult sleep but was managing his own medication which made things really bad  Had a lot of OCD/anxiety symptoms    After moving into the [facility] center and having the [facility] take over his medication he has been doing a lot better    Diagnosis  Alzheimer's disease  Bipolar disorder  MRI scan with significant atrophy going back to 2016    Discussed with patient and daughters  Patient will follow-up in 6 months  No change in medications    Mirtazapine 15 mg at nighttime  Lexapro 20 mg daily  Trazodone 100 mg nightly  Melatonin 10 mg at nighttime  Exelon 3 mg twice daily  Depakote 250 mg 2 tabs p.o. twice daily      Total care time today 39 minutes  The longitudinal plan of care for the diagnosis(es)/condition(s) as documented were addressed during this visit. Due to the added complexity in care, I will continue to support Patrick in the subsequent management and with ongoing continuity of care.          As per the visit today  Reviewed laboratory data  Reviewed primary MD note 2/18/2025

## 2025-05-14 ENCOUNTER — OFFICE VISIT (OUTPATIENT)
Dept: NEUROLOGY | Facility: CLINIC | Age: 89
End: 2025-05-14
Payer: COMMERCIAL

## 2025-05-14 VITALS
BODY MASS INDEX: 20.44 KG/M2 | HEIGHT: 69 IN | HEART RATE: 63 BPM | DIASTOLIC BLOOD PRESSURE: 54 MMHG | WEIGHT: 138 LBS | SYSTOLIC BLOOD PRESSURE: 83 MMHG

## 2025-05-14 DIAGNOSIS — F30.9 BIPOLAR I DISORDER, SINGLE MANIC EPISODE (H): ICD-10-CM

## 2025-05-14 DIAGNOSIS — G30.9 ALZHEIMER'S DISEASE (H): Primary | ICD-10-CM

## 2025-05-14 DIAGNOSIS — F02.80 ALZHEIMER'S DISEASE (H): Primary | ICD-10-CM

## 2025-05-14 RX ORDER — AMOXICILLIN 250 MG
1 CAPSULE ORAL
COMMUNITY
Start: 2025-02-07

## 2025-05-14 RX ORDER — MECLIZINE HYDROCHLORIDE 25 MG/1
25 TABLET ORAL 2 TIMES DAILY PRN
COMMUNITY
Start: 2024-11-19

## 2025-05-14 NOTE — LETTER
5/14/2025      Patrick Wharton  [address]  [facility] [address]      Dear Colleague,    Thank you for referring your patient, Patrick Wharton, to the [facility] [place]. Please see a copy of my visit note below.    In person evaluation    HPI  January 1, 2020, in person consultation transfer of care from Dr. Vikas Mcginnis  4/27/2020, telephone visit  8/3/2020, telephone visit  11/6/2020, video visit  8/13/2021, in person visit  2/16/2022, in person visit  8/19/2022, in person visit  3/13/2023, in person visit  11/3/2023, in person visit  8/12/2024, in person visit  11/26/2024, in person visit  12/31/2024, in person visit  5/14/2025, in person visit          89-year-old followed neurologically for:  Cognitive decline (memory loss)  Onset as far back as 2015  Difficulty remembering people's names  Previously cared for by Dr. Vikas Mcginnis  History of bipolar disorder  Abnormal EEG      May 14, 2025 visit  Patient now lives at [facility].  He is not as anxious  Family feels he is doing much better    At the care center they do all of his cares  Dr. Paul Bloomberg helps care for him at [facility]  They take care of his meds and this is really helped  Cleaning is taking care of him  They wean him down to the dining room in the wheelchair  He feels the food is okay and does get Ensure for some supplement  They help him with dressing he has very poor shoulders  They transfer him from the bed to the wheelchair with the EZ stand  He gets help with toilet and bath    Does have a hearing aids in but he can hear me when were talking    He reads the newspaper in the morning  He likes watching TV in the [employer] news our  He likes sports  They sometimes do a little bit of bingo  Family is happy with the new living situation  With his meds controlled he seems to be much more stable    Mirtazapine 15 mg at nighttime  Lexapro 20 mg daily  Trazodone 100 mg nightly  Melatonin 10 mg at  nighttime  Exelon 3 mg twice daily  Depakote 250 mg 2 tabs p.o. twice daily    Summary since last seen  Laboratory data 1/25/2025  Depakote            92.8  AST                     27  NH3                    25       I think we should give the Depakote a little bit longer to see if it has some effect  I would not want to increase this any further  I did send a letter stating that other choices would be to try some Seroquel but in patients with dementia and elderly has increased risk for MI and death.  I think we are running out of options and we will have to weigh the risk versus benefit of these medications  He could check with his primary to see if they have other options to recommend.    Past visit reviews:  Telephone visit 2/20/2025  Filled out form about patient's disability  Patient previously had mild cognitive impairment  Now has Alzheimer's switch diagnosis 8/12/2024  Has at least moderate cognitive impairment mainly because of poor discretion and inability to care for self.  Also has physical difficulty and needs help with  Activities of daily living such as dressing, getting into the shower with a wheeled shower chair, assistance getting in and out of bed  Needs help with making sure that he eats properly due to his poor discretion  He should no longer be in control of any of his meds and they should be monitored and controlled by others  Increasing agitation and poor discretion complicating his cognitive decline.    Patient did a visit with primary team 2/18/2025  As both Alzheimer's dementia/bipolar disorder.  Previously patient had been managing insomnia and constipation in inappropriate way as he over-the-counter medications.  This made management of his other symptoms difficult.  He needed increased seen higher level of care at the residency where he lives.            December 2024 visit review:  Multiple EMR interactions (7 since last visit)  Patient added on again emergently for reevaluation in  regards to his dementia/bipolar disorder  Patient accompanied by family members    Patient has difficulty with rumination about his bowel movements  Patient has difficulty with being anxious and cannot get to sleep  Patient sometimes does not remember what bothers him but he writes everything down in a notebook  When I ask him what his biggest concern as he talks about his bowel movements which he is fixated on also    His care center has taken over his medications  He does use the melatonin himself        Mirtazapine 45 mg 1 p.o. nightly  Rivastigmine 3 mg 1 p.o. twice daily  Depakote 250 mg tablet 1 tab p.o. twice daily    Patient having a lot of difficulty with sleep  Uses the mirtazapine at 8-9 PM  Also is using melatonin  Assisted living has taken over doing his medications  Patient feels that since they took over his meds he is having a lot more trouble sleeping.    Family is wondering about infusions for Alzheimer's disease.  We reviewed the actual MRI scan from 2016 showing atrophy  I am concerned that with his cognitive decline going on for quite some time and his age that the risks of these antiamyloid infusions are greater than the benefit that he would have  After discussion with the patient and his family members we all decided that we would hold off on that type of intervention.    Patient has had difficulty with memory troubles going on at least since 2015  Complicated by some bipolar disorder which makes it difficult to know how much is from a gene and his psych disease  Difficulty given exact staging of his memory disorder but he scores relatively okay on MoCA test in the past  I would put him in the mild to moderate range due to the combination of medical issues    Patient is chronically in the wheelchair  He can transfer from the bed to the wheelchair  He can transfer off the toilet which is a raised toilet seat okay                Emergency add-on visit 11/26/2024:  Emergency add-on due to  increasing behavioral difficulties  [facility]  Patient with increasing confusion/anxiety, patient requiring increasing care needs.  Primary worked on getting patient into care setting.    Patient is revved up about a lot of issues  Has decreased hearing  Seems to fixate on a topic and gets anxious/agitated  Is sleeping better with the 45 mg of mirtazapine at night  I think we should also increase the Depakote though during the day  Does have a history of bipolar disorder    Lives at [facility]  They take care of his meds  Gets a shower 2 times per week  He goes down for supper  Has some Ensure and some nuts in the morning  Has chronic decreased range of motion of his shoulders from past shoulder trouble        Patient on some mirtazapine to help with mood and sleep  Relative rapid increase in medication after going to care center from 30 mg to 45  Patient also has difficulty with bipolar disorder    Patient should be on for his dementia  Mirtazapine 45 mg 1 p.o. nightly  Exelon 3 mg p.o. twice daily  Depakote  250 mg tablet 1 tab p.o. twice daily (increased 11/26/2024)    November 2024 visit review:  ENT visit 12/12/2023 dizziness episodic/stable hearing loss on right side/Ménière's disease left side  PMD visit 2/5/2024 pressure ulcer left hip  NP visit 6/26/2024 (mobility difficulty/imbalance)  PMD visit 7/16/2024 physical deconditioning ordered PT/OT  No hospitalizations  No surgeries    MoCA  8/12/2024,    19 out of 30  Patient has been in the wheelchair more due to poor balance  Patient's wife passed away in May 2024 which is a major stressor    Lives in assisted living  Decreased mobility  Transferring from wheelchair to bed    Does have dizziness/vertigo  Has significant hearing loss left ear is very bad, right ear has a hearing aid and it kind of works  Patient does better when he can read lips     No hallucinations/no vivid dreams  Gets up once at nighttime  to go to the bathroom  Uses walker/wheelchair  Sometimes if he moves too quick he will have a little bit of dizziness    No faintness  Has some difficulty with bradycardia  No diarrhea    Only eats 1 meal a day but that is his choice  No hallucinations no vivid dreams    Walks okay with the 4 wheeled walker with hand break  Family thought that maybe he fatigues with the 25-minute walk  Back when he had COVID a year before it kind of took a lot out of him but he rallied a bit      Mirtazapine 30 mg at nighttime to help with sleep prescribed by other physicians    Forgetfulness is about the same although 8 years have gone by, it has fallen off a bit.              A.  Mild cognitive impairment       Onset as far back as 2015       Trouble remembering people's names       Past abnormal EEG       Previously cared for by Dr. Vikas Mcginnis       Mother had some dementia the last year of life when she lived to be 90       Scored a 23 out of 30, (8/13/2021)       Scored a 25 out of 30, (January 2020)       Subtle change over a year and a half         We discussed cognitive decline       Exelon 3 mg 1 tab p.o. twice daily      Depakote 250 mg 1 p.o. twice daily (started 11/26/2024)      Depakote  mg 2 p.o. twice daily, (increased 12/31/2024)          No GERD no diarrhea no lightheadedness no black tarry stools no blood in the stool      Does have severe hearing loss at baseline      B.  Has history of bipolar disorder        Neurologic review from original visit August 2021  Patient feels that he has had a falloff in his memory has more trouble tracking and following conversations  Used to go to a group with his retired friends from "[employer]" but they cannot get together due to Covid  Has severe hearing loss in both ears  Hearing doctor thought that they could maybe make a hearing aid that would work on the left ear but the not sure  With the mask on and talking to him he misses a lot of the conversation though  I am sure this is  causing some difficulty with his memory recall and tracking conversations    Patient is still coherent can should chat about multiple topics  Can jump from topic to topic fairly easy  Does better when we talk about more things from the past  Scored a 23 out of 30, (2021)  Scored a 25 out of 30, (2020)  Subtle change over a year and a half        Neurologic summary:  Previously followed by Dr. Vikas Mcginnis  Mild cognitive impairment as far back as   Patient works for [employer] as a   Retired in   MoCA testing 2020, 25 out of 30  Seems to have difficulty remembering people's names  Has significant hearing loss right greater than left left is not enough to be helped by hearing aid  Past history of bipolar disorder  Mother with dementia in the last year of her life at age 90  Uncle last year of life age 88 developed dementia  In the distant past of been on Depakote for 2 to 3 years for bipolar disorder  Has an abnormal EEG  Patient restarted on Depakote   Talked about the pros and cons of using this to maybe stabilize the temporal lobe see if his symptoms are little less severe may be slow down the progression  His daughter has depression and is on Depakote    Patient tends to be quite active rows or exercises on the exercise bike regularly  Watches his diet hemoglobin A1c's have been good  Tries to stay active volunteers and participates in activities      Past medical history  Mild cognitive impairment  Bipolar disorder  Prostate cancer  Benign prostatic hypertrophy  Seborrheic dermatitis  Osteoarthritis with shoulder surgery  Left leg edema    Habits   Non-smoker  Does not drink alcohol  Worked as a [employer]  retired in   Lives in seniors apartment with elevator  Does exercise regularly stays active      Family history  Mother had glaucoma congestive heart failure dementia  about age 90  Father  at 34 with testicular cancer  He has 1 sister with some diabetes  Daughter with  depression treated with Depakote  Uncle with difficulty with memory at the age of 88 last year of his life      Work-up review  MRI scan brain December 2016 mild to moderate atrophy progressed mildly from 2010  Laboratory data December 2019  Sodium 140 potassium 4.9 BUN 8 creatinine 0.8 glucose 94  White blood count 6.3 hemoglobin 13.2 platelets 313,000  Hemoglobin A1c 5.8%  Outside records from Dr. Mcginnis January 2018, January 2018 reviewed  EEG January 21, 2020 rare F7/T3 sharp waves with RAINE activity with hyperventilation mild to moderately abnormal  B12 689 ,TSH 1.57, (1/2020)    MoCA  1/20/2020,    25 out of 30   8/13/2021,   23 out of 30   8/19/2022,   22 out of 30  3/13/2023,    24 out of 30  8/12/2024,    19 out of 30    Laboratory data review                      12/2022          5/2023        3/2024          10/2024       12/2024    1/25/2025     4/2025      5/2025  Depakote                                                                                                         92.8              89.3  NH3                                                                                                                  25  NA/K             138/4.6          136/4.2       137/4.8         135/4.2        134/4.5                                          137/4.1  BUN/Cr          3.7/0.73        6/0.82          21.4/0.74     14.2/0.76     7.7/0.74                                         24.5/0.81  GLU               91                  87              100               90                84                                                   84  AST               30                                    28                                     20               27  WBC/HGB     5.7/13.2         5.3/13.3     11.3/13.3                           5.4/13.4                                        5.1/11.1  PLTs              226,000         227,000      205,000                            235,000                                         117,000  HGBA1C        5.8                5.9                                                       5.9  B12                                                        718  TSH                                                       1.52                                   1.26            Exam    Review of system   Pertinent positives and negatives  Chronic hearing loss left much worse than right has a hearing aid on the right  Reads lips    No hallucinations  No vivid dreams    Dizziness periodically  No diplopia  No dysarthria  No dysphagia  No diarrhea  No tremor  No chest pain no shortness of breath  No headache    Transfers with EZ stand  Mainly gets around in the wheelchair    Otherwise review systems negative    General exam  Blood pressure 83/54, pulse 63  Alert oriented x3  HEENT significant for hearing loss in left ear and right ear poor hearing and has a hearing aid  Lungs clear  Abdomen soft  No edema the feet      Neurologic exam  Alert orient x3  Normal prosody speech  Normal naming  Normal comprehension  Normal repetition  No aphasia  No neglect  MoCA  3/13/2023,    24 out of 30  8/12/2024,    19 out of 30    Cranial 2 through 12 are significant for  Deaf in left ear significant hearing loss of the other  No ophthalmoplegia  No nystagmus  Face symmetrical  Tongue twisters okay  Visual fields intact    Upper extremities  No drift no tremor normal finger-nose  Has chronic decreased range of motion of the shoulders right worse than left previous shoulder surgery    Lower extremities  Distal proximal strength good    Gait  Now gets around mostly in the wheelchair  Transferred from the bed to the wheelchair with the EZ stand  Significant chronic lower extremity weakness with retropulsion  Very flexed posture                Assessment/Plan     1.  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       Mild cognitive impairment      Onset as far back as 2015       Family history  of dementia in the later years mom and uncle       History of bipolar disorder       Abnormal EEG F7/T3 sharp wave    8/12/2024,    19 out of 30           Depakote 250 mg 1 tablet twice daily (increased 11/26/2024)       Exelon 3 mg capsule twice daily (added 8/13/2021)    Tolerating medication    Discussed neurodegenerative disease and progression  Discussed as time goes on there is less neurologic reserve  With significant hearing loss this can affect his cognition also  Discussed that more meds may not necessarily make things better.  Discussed gait safety   Patient's wife passed away May 2024, stressor that can exacerbate symptoms      I think his psychiatric disease along with his age and previous cognitive difficulties have caught up with him at this time.  He used to be somewhat better when his wife was still living as I think she helped stabilize him cognitively    Patient now living in the care center  Has a Alzheimer's type degenerative disease  Exacerbated by bipolar disorder  Has poor hearing  Previously very difficult sleep but was managing his own medication which made things really bad  Had a lot of OCD/anxiety symptoms    After moving into the care center and having the care center take over his medication he has been doing a lot better    Diagnosis  Alzheimer's disease  Bipolar disorder  MRI scan with significant atrophy going back to 2016    Discussed with patient and daughters  Patient will follow-up in 6 months  No change in medications    Mirtazapine 15 mg at nighttime  Lexapro 20 mg daily  Trazodone 100 mg nightly  Melatonin 10 mg at nighttime  Exelon 3 mg twice daily  Depakote 250 mg 2 tabs p.o. twice daily      Total care time today 39 minutes  The longitudinal plan of care for the diagnosis(es)/condition(s) as documented were addressed during this visit. Due to the added complexity in care, I will continue to support Patrick in the subsequent management and with ongoing continuity of  care.          As per the visit today  Reviewed laboratory data  Reviewed primary MD note 2/18/2025      Again, thank you for allowing me to participate in the care of your patient.        Sincerely,        chandler Rene MD    Electronically signed

## 2025-05-14 NOTE — NURSING NOTE
Chief Complaint   Patient presents with    Dementia     5 month follow up. Family feels he is doing much better. Getting more care at [facility]. He is not as anxious.     Rolanda Lomeli LPN on 5/14/2025 at 2:08 PM

## 2025-06-25 NOTE — TELEPHONE ENCOUNTER
[facility] Call Center    Phone Message    May a detailed message be left on voicemail: yes     Reason for Call: Medication Refill Request    Has the patient contacted the pharmacy for the refill? Yes   Name of medication being requested: divalproex sodium delayed-release (DEPAKOTE) 250 MG DR tablet     Provider who prescribed the medication: Dr. Rene  Pharmacy: [facility] [id number] [place], [place] - [address]  Date medication is needed: asap   Pt called to request a refill on medication. Pt states he tired to get through to pharmacy and was unsuccessful.    Please call Pt back at [phone] to discuss further.      Action Taken: Message routed to:  Other: [facility]    Travel Screening: Not Applicable                                                                       25-Jun-2025 11:05